# Patient Record
Sex: FEMALE | Race: WHITE | NOT HISPANIC OR LATINO | Employment: OTHER | URBAN - METROPOLITAN AREA
[De-identification: names, ages, dates, MRNs, and addresses within clinical notes are randomized per-mention and may not be internally consistent; named-entity substitution may affect disease eponyms.]

---

## 2017-06-16 ENCOUNTER — HOSPITAL ENCOUNTER (EMERGENCY)
Facility: CLINIC | Age: 82
Discharge: HOME | End: 2017-06-16
Attending: EMERGENCY MEDICINE | Admitting: EMERGENCY MEDICINE
Payer: MEDICARE

## 2017-06-16 VITALS
DIASTOLIC BLOOD PRESSURE: 92 MMHG | HEART RATE: 93 BPM | SYSTOLIC BLOOD PRESSURE: 173 MMHG | BODY MASS INDEX: 23.92 KG/M2 | RESPIRATION RATE: 16 BRPM | WEIGHT: 130 LBS | TEMPERATURE: 99.1 F | OXYGEN SATURATION: 97 % | HEIGHT: 62 IN

## 2017-06-16 DIAGNOSIS — M25.475 BILATERAL SWELLING OF FEET AND ANKLES: ICD-10-CM

## 2017-06-16 DIAGNOSIS — M25.472 BILATERAL SWELLING OF FEET AND ANKLES: ICD-10-CM

## 2017-06-16 DIAGNOSIS — M25.474 BILATERAL SWELLING OF FEET AND ANKLES: ICD-10-CM

## 2017-06-16 DIAGNOSIS — M25.471 BILATERAL SWELLING OF FEET AND ANKLES: ICD-10-CM

## 2017-06-16 LAB
ALBUMIN SERPL-MCNC: 3.2 G/DL (ref 3.4–5)
ALBUMIN UR-MCNC: 10 MG/DL
ALP SERPL-CCNC: 51 U/L (ref 40–150)
ALT SERPL W P-5'-P-CCNC: 23 U/L (ref 0–50)
ANION GAP SERPL CALCULATED.3IONS-SCNC: 7 MMOL/L (ref 3–14)
APPEARANCE UR: CLEAR
AST SERPL W P-5'-P-CCNC: 19 U/L (ref 0–45)
BACTERIA #/AREA URNS HPF: ABNORMAL /HPF
BASOPHILS # BLD AUTO: 0 10E9/L (ref 0–0.2)
BASOPHILS NFR BLD AUTO: 0.2 %
BILIRUB SERPL-MCNC: 0.4 MG/DL (ref 0.2–1.3)
BILIRUB UR QL STRIP: NEGATIVE
BUN SERPL-MCNC: 21 MG/DL (ref 7–30)
CALCIUM SERPL-MCNC: 8.5 MG/DL (ref 8.5–10.1)
CHLORIDE SERPL-SCNC: 108 MMOL/L (ref 94–109)
CO2 SERPL-SCNC: 25 MMOL/L (ref 20–32)
COLOR UR AUTO: YELLOW
CREAT SERPL-MCNC: 0.61 MG/DL (ref 0.52–1.04)
DIFFERENTIAL METHOD BLD: NORMAL
EOSINOPHIL # BLD AUTO: 0 10E9/L (ref 0–0.7)
EOSINOPHIL NFR BLD AUTO: 0.7 %
ERYTHROCYTE [DISTWIDTH] IN BLOOD BY AUTOMATED COUNT: 14.2 % (ref 10–15)
GFR SERPL CREATININE-BSD FRML MDRD: ABNORMAL ML/MIN/1.7M2
GLUCOSE SERPL-MCNC: 100 MG/DL (ref 70–99)
GLUCOSE UR STRIP-MCNC: NEGATIVE MG/DL
HCT VFR BLD AUTO: 39.4 % (ref 35–47)
HGB BLD-MCNC: 13.1 G/DL (ref 11.7–15.7)
HGB UR QL STRIP: NEGATIVE
IMM GRANULOCYTES # BLD: 0 10E9/L (ref 0–0.4)
IMM GRANULOCYTES NFR BLD: 0.2 %
INTERPRETATION ECG - MUSE: NORMAL
KETONES UR STRIP-MCNC: NEGATIVE MG/DL
LEUKOCYTE ESTERASE UR QL STRIP: ABNORMAL
LYMPHOCYTES # BLD AUTO: 1.5 10E9/L (ref 0.8–5.3)
LYMPHOCYTES NFR BLD AUTO: 34.9 %
MCH RBC QN AUTO: 33 PG (ref 26.5–33)
MCHC RBC AUTO-ENTMCNC: 33.2 G/DL (ref 31.5–36.5)
MCV RBC AUTO: 99 FL (ref 78–100)
MONOCYTES # BLD AUTO: 0.2 10E9/L (ref 0–1.3)
MONOCYTES NFR BLD AUTO: 4.4 %
MUCOUS THREADS #/AREA URNS LPF: PRESENT /LPF
NEUTROPHILS # BLD AUTO: 2.6 10E9/L (ref 1.6–8.3)
NEUTROPHILS NFR BLD AUTO: 59.6 %
NITRATE UR QL: NEGATIVE
NRBC # BLD AUTO: 0 10*3/UL
NRBC BLD AUTO-RTO: 0 /100
NT-PROBNP SERPL-MCNC: 473 PG/ML (ref 0–1800)
PH UR STRIP: 6 PH (ref 5–7)
PLATELET # BLD AUTO: 175 10E9/L (ref 150–450)
POTASSIUM SERPL-SCNC: 4.1 MMOL/L (ref 3.4–5.3)
PROT SERPL-MCNC: 5.9 G/DL (ref 6.8–8.8)
RBC # BLD AUTO: 3.97 10E12/L (ref 3.8–5.2)
RBC #/AREA URNS AUTO: 1 /HPF (ref 0–2)
SODIUM SERPL-SCNC: 140 MMOL/L (ref 133–144)
SP GR UR STRIP: 1.01 (ref 1–1.03)
SQUAMOUS #/AREA URNS AUTO: <1 /HPF (ref 0–1)
URN SPEC COLLECT METH UR: ABNORMAL
UROBILINOGEN UR STRIP-MCNC: NORMAL MG/DL (ref 0–2)
WBC # BLD AUTO: 4.3 10E9/L (ref 4–11)
WBC #/AREA URNS AUTO: 8 /HPF (ref 0–2)

## 2017-06-16 PROCEDURE — 87186 SC STD MICRODIL/AGAR DIL: CPT | Performed by: EMERGENCY MEDICINE

## 2017-06-16 PROCEDURE — 93005 ELECTROCARDIOGRAM TRACING: CPT

## 2017-06-16 PROCEDURE — 85025 COMPLETE CBC W/AUTO DIFF WBC: CPT | Performed by: EMERGENCY MEDICINE

## 2017-06-16 PROCEDURE — 99284 EMERGENCY DEPT VISIT MOD MDM: CPT

## 2017-06-16 PROCEDURE — 83880 ASSAY OF NATRIURETIC PEPTIDE: CPT | Performed by: EMERGENCY MEDICINE

## 2017-06-16 PROCEDURE — 81001 URINALYSIS AUTO W/SCOPE: CPT | Performed by: EMERGENCY MEDICINE

## 2017-06-16 PROCEDURE — 87088 URINE BACTERIA CULTURE: CPT | Performed by: EMERGENCY MEDICINE

## 2017-06-16 PROCEDURE — 80053 COMPREHEN METABOLIC PANEL: CPT | Performed by: EMERGENCY MEDICINE

## 2017-06-16 PROCEDURE — 87086 URINE CULTURE/COLONY COUNT: CPT | Performed by: EMERGENCY MEDICINE

## 2017-06-16 ASSESSMENT — ENCOUNTER SYMPTOMS
FEVER: 0
GASTROINTESTINAL NEGATIVE: 1
SHORTNESS OF BREATH: 0
COUGH: 0
CHILLS: 0

## 2017-06-16 NOTE — ED PROVIDER NOTES
History     Chief Complaint:  Feet swelling    HPI   Danni Alvarez is a 84 year old female who presents for evaluation of feet swelling. The patient reports a 3-4 day history of painless bilateral pedal edema. She had been wearing some tight stockings prior to onset, but otherwise has not had any recent leg injuries. The patient denies chest pain, dyspnea, fever or chills, cough, abdominal pain, urinary symptoms, gait difficulty, or any other acute symptoms. The patient has not been up on her feet more than normal recently. 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 No history of PE/DVT or CHF.    Her only risk factors for PE/DVT are age and gender.       Allergies:  Morphine      Medications:    traMADol (ULTRAM) 50 MG tablet  ALPRAZolam (XANAX PO)  Donepezil HCl (ARICEPT PO)  Levothyroxine Sodium (LEVOTHROID PO)  LIPITOR 10 MG OR TABS    Past Medical History:    Shoulder impingement syndrome   Abnormal feces  Hyperlipidemia  Anxiety  Hypothyroidism  Major depressive disorder  Ca colon  Mild cognitive impairment    Past Surgical History:    Colectomy  Hernia repair  Tubal ligation  Ptosis repair, bilateral     Family History:    History reviewed. No pertinent family history.      Social History:  Non-smoker. Occasional alcohol consumption.  Marital Status:   [5]  Employed as a realtor.  An active golfer.     Review of Systems   Constitutional: Negative for chills and fever.   Respiratory: Negative for cough and shortness of breath.    Cardiovascular: Positive for leg swelling (see HPI). Negative for chest pain.   Gastrointestinal: Negative.    Musculoskeletal: Negative for gait problem.   All other systems reviewed and are negative.        Physical Exam   First Vitals:       Patient Vitals for the past 24 hrs:   BP Temp Temp src Heart Rate Resp SpO2 Height Weight   06/16/17 1230 (!) 143/94 - - - - 96 % - -   06/16/17 1141 (!) 146/94 99.1  F (37.3  C) Oral 82 16 95 % 1.575 m (5'  "2\") 59 kg (130 lb)      Physical Exam  General: Patient is alert and normal appearing.  HEENT: Head atraumatic    Eyes: pupils equal and reactive. Conjunctiva clear   Nares: patent   Oropharynx: no lesions, uvula midline, no palatal draping, normal voice, no trismus  Neck: Supple without lymphadenopathy, no meningismus  Chest: Heart regular rate and rhythm.   Lungs: Equal clear to auscultation with no wheeze or rales  Abdomen: Soft, non tender, nondistended, normal bowel sounds  Back: No costovertebral angle tenderness, no midline C, T or L spine tenderness  Neuro: Grossly nonfocal, normal speech, strength equal bilaterally, CN 2-12 intact  Extremities: No deformities, equal radial and DP pulses. No clubbing, cyanosis.  Bilateral fluid edema to ankles and feet, feet warm and well perfused.  No calf swelling, erythema, warmth or tenderness  Skin: Warm and dry with no rash.       Emergency Department Course   ECG:  ECG (12:06:47):  Indication: pedal edema  Rate 72 bpm. UT interval 144. QRS duration 68. QT/QTc 404/442. P-R-T axes 59, -34, 78.   NSR. Left axis deviation. Abnormal EKG.  No previous EKG available for comparison.   Interpreted at 1207 by Oanh Stoddard MD.     Laboratory:  CBC w/diff: WNL (WBC 4.3, Hgb 13.1, Plt 175)  CMP: Glu 100 (H), Alb 3.2 (L), t-Prot 5.9 (L), Cr 0.61 (WNL), o/w WNL   BNP: 473 (WNL)   UA: 10 protein, trace leukocyte esterase, 8 WBC, few bacteria, mucous present, o/w negative     Emergency Department Course:  Past medical records, nursing notes, and vitals reviewed.  1201: I performed an exam of the patient as documented above. GCS 15.   IV access established. Blood drawn and sent.  The above EKG and labs were obtained.  Clinical findings and plan explained to the Patient. Patient discharged home with instructions regarding supportive care, medications, and reasons to return as well as the importance of close follow-up were reviewed.        Impression & Plan    Medical Decision " Makin year old female presents to the ED with bilateral ankle and foot swelling. She says she has noticed it for a few weeks but today people with whom she works (she still works as a realtor showing houses) were concerned about her swelling and thought she should come in. She has had no chest pain, shortness of breath, calf pain or swelling. There is no erythema or warmth. I do not suspect DVT given bilateral symptoms. I do not feel this is consistent with cardiac etiology but did do an EKG that was within acceptable limits without any acute ischemic changes. BNP is normal.  LFTs were all within normal limits. There is mild protein in her urine so there may be a component of some nephrotic syndrome but it is not a significant amount and she has actually had more in the past. She does have trace leukocyte esterase and 8 white blood cells and a few bacteria but she has had no UTI symptoms and I reconfirmed that with her so have sent it for culture. Her electrolytes were otherwise within normal limits. Creatinine is normal. She has had normal urinary output. I do not feel this is consistent with acute volume overload. I have recommended her to use compression stockings and elevate her legs.   If that fails to improve her symptoms she should follow up with her PCP for possible echocardiogram or Lasix. She expressed agreement and understanding with this plan. All questions fully answered.         Diagnosis:    ICD-10-CM    1. Bilateral swelling of feet and ankles M25.473     M25.476        Disposition:  discharged to home    Discharge Medications:  New Prescriptions    No medications on file         Joseph Shay  2017    EMERGENCY DEPARTMENT  IJoseph am serving as a scribe at 11:42 AM on 2017 to document services personally performed by Oanh Stoddard MD based on my observations and the provider's statements to me.      Oanh Stoddard MD  [phone]

## 2017-06-16 NOTE — ED AVS SNAPSHOT
Emergency Department    [address] [place] [address]    Phone:  [phone]    Fax:  [phone]                                       Danni Alvarez   MRN: [id number]    Department:   Emergency Department   Date of Visit:  6/16/2017           After Visit Summary Signature Page     I have received my discharge instructions, and my questions have been answered. I have discussed any challenges I see with this plan with the nurse or doctor.    ..........................................................................................................................................  Patient/Patient Representative Signature      ..........................................................................................................................................  Patient Representative Print Name and Relationship to Patient    ..................................................               ................................................  Date                                            Time    ..........................................................................................................................................  Reviewed by Signature/Title    ...................................................              ..............................................  Date                                                            Time

## 2017-06-16 NOTE — DISCHARGE INSTRUCTIONS
Coping with Edema  What is edema?  Edema is the build-up of fluid in the body, which causes swelling. Swelling most commonly occurs in the feet, ankles, lower legs or hands.  Swelling can occur in the belly or chest may be a sign of a more severe problem.  Certain medicines or conditions can make the swelling worse.  Symptoms include:    Feet and lower legs get larger when you sit or walk.    Hands feel tight when you make a fist.    When you push on the skin, skin stays dented.    Shiny, tight skin.    Fast weight gain.  How is it treated?  Your care team may give you a medicine to reduce the swelling.  They may also suggest that you meet with a dietitian. He or she can help with food choices to reduce the swelling.  What can I do about the swelling?    Place your feet above your heart 3 times a day: Sit with your feet up on a stool with a pillow. Sit on the bed or couch with two pillows under your feet.    Do not stand for long periods of time.    Wear loose-fitting clothes.    Do not cross your legs.    Reduce the salt in your diet.   These foods are high in salt:    Chips, soup    Frozen meals, TV dinners    Whitman, lunch meat, ham    Sauces (soy, canned spaghetti sauce)    Walk or do other exercise.    Wear compression stockings.    Drink water as normal.    Weigh yourself every day at the same time to keep track of weight gain.  When should I call my care team?  Call your care team if:    You have a hard time breathing.    You gained 5 pounds or more in 1 week.    Your hands or feet feel cold when you touch them.    You are peeing very little or not at all.    Swelling is moving up your arms or legs.    Your tongue is swelling.    You cannot eat for more than a day  If you have any side effects, call us. We can help you manage these problems.  For more information,  see:  www.[facility].com  www.cancer.org/treatment/treatmentsandsideeffects/physicalsideeffects/dealingwithsymptomsathome  www.cancer.gov/cancertopics/coping/chemotherapy-and-you  Comments:  __________________________________________  __________________________________________  __________________________________________  __________________________________________  __________________________________________  __________________________________________  __________________________________________  For informational purposes only. Not to replace the advice of your health care provider.  Copyright   2014 [employer]. All rights reserved. [employer] 729498 - REV 03/16.        Wear compression stockings.  Elevate legs as much as possible.

## 2017-06-16 NOTE — ED AVS SNAPSHOT
Emergency Department    [address] [facility] [address]    Phone:  [phone]    Fax:  [phone]                                       Danni Alvarez   MRN: [id number]    Department:   Emergency Department   Date of Visit:  6/16/2017           Patient Information     Date Of Birth          1/16/1933        Your diagnoses for this visit were:     Bilateral swelling of feet and ankles        You were seen by Oanh Stoddard MD.      Follow-up Information     Follow up with Catrachito Figueroa MD In 3 days.    Specialty:  Ophthalmology    Contact information:    [facility]  [address]  [facility] [address] [phone]          Follow up with  Emergency Department.    Specialty:  EMERGENCY MEDICINE    Why:  If symptoms worsen    Contact information:    [address] [facility] [id number] [phone]        Discharge Instructions       Coping with Edema  What is edema?  Edema is the build-up of fluid in the body, which causes swelling. Swelling most commonly occurs in the feet, ankles, lower legs or hands.  Swelling can occur in the belly or chest may be a sign of a more severe problem.  Certain medicines or conditions can make the swelling worse.  Symptoms include:    Feet and lower legs get larger when you sit or walk.    Hands feel tight when you make a fist.    When you push on the skin, skin stays dented.    Shiny, tight skin.    Fast weight gain.  How is it treated?  Your care team may give you a medicine to reduce the swelling.  They may also suggest that you meet with a dietitian. He or she can help with food choices to reduce the swelling.  What can I do about the swelling?    Place your feet above your heart 3 times a day: Sit with your feet up on a stool with a pillow. Sit on the bed or couch with two pillows under your feet.    Do not stand for long periods of time.    Wear loose-fitting clothes.    Do not cross your legs.    Reduce the  salt in your diet.   These foods are high in salt:    Chips, soup    Frozen meals, TV dinners    Whitman, lunch meat, ham    Sauces (soy, canned spaghetti sauce)    Walk or do other exercise.    Wear compression stockings.    Drink water as normal.    Weigh yourself every day at the same time to keep track of weight gain.  When should I call my care team?  Call your care team if:    You have a hard time breathing.    You gained 5 pounds or more in 1 week.    Your hands or feet feel cold when you touch them.    You are peeing very little or not at all.    Swelling is moving up your arms or legs.    Your tongue is swelling.    You cannot eat for more than a day  If you have any side effects, call us. We can help you manage these problems.  For more information, see:  www.chemocare.com  www.cancer.org/treatment/treatmentsandsideeffects/physicalsideeffects/dealingwithsymptomsathome  www.cancer.gov/cancertopics/coping/chemotherapy-and-you  Comments:  __________________________________________  __________________________________________  __________________________________________  __________________________________________  __________________________________________  __________________________________________  __________________________________________  For informational purposes only. Not to replace the advice of your health care provider.  Copyright   2014 [facility]. All rights reserved. [employer] 848952 - REV 03/16.        Wear compression stockings.  Elevate legs as much as possible.      24 Hour Appointment Hotline       To make an appointment at any [facility], call [phone] ([phone]). If you don't have a family doctor or clinic, we will help you find one. [facility] clinics are conveniently located to serve the needs of you and your family.             Review of your medicines      Our records show that you are taking the medicines listed below. If these are incorrect, please call your  family doctor or clinic.        Dose / Directions Last dose taken    ARICEPT PO   Dose:  10 mg        Take 10 mg by mouth At Bedtime   Refills:  0        erythromycin ophthalmic ointment   Commonly known as:  ROMYCIN   Quantity:  3.5 g        Apply small amount to inner lower lid of operative eye(s) at bedtime for 7 days and as needed   Refills:  10        LEVOTHROID PO   Dose:  50 mcg        Take 50 mcg by mouth   Refills:  0        LIPITOR 10 MG tablet   Generic drug:  atorvastatin        1 TABLET DAILY   Refills:  0        neomycin-polymyxin-dexamethasone [id number] Susp ophthalmic susp   Commonly known as:  MAXITROL   Quantity:  1 Bottle        Place 1 drop in operative eye(s) 3 times daily for 10 days   Refills:  11        traMADol 50 MG tablet   Commonly known as:  ULTRAM   Dose:   mg   Quantity:  30 tablet        Take 1-2 tablets ( mg) by mouth every 6 hours as needed for moderate pain   Refills:  0        XANAX PO   Dose:  0.25 mg        Take 0.25 mg by mouth 2 times daily   Refills:  0                Procedures and tests performed during your visit     CBC with platelets differential    Comprehensive metabolic panel    EKG 12-lead, tracing only    Nt probnp inpatient (BNP)    UA reflex to Microscopic and Culture      Orders Needing Specimen Collection     None      Pending Results     No orders found from 6/14/2017 to 6/17/2017.            Pending Culture Results     No orders found from 6/14/2017 to 6/17/2017.            Pending Results Instructions     If you had any lab results that were not finalized at the time of your Discharge, you can call the ED Lab Result RN at [phone]. You will be contacted by this team for any positive Lab results or changes in treatment. The nurses are available 7 days a week from 10A to 6:30P.  You can leave a message 24 hours per day and they will return your call.        Test Results From Your Hospital Stay        6/16/2017 12:24 PM      Component Results      Component Value Ref Range & Units Status    WBC 4.3 4.0 - 11.0 10e9/L Final    RBC Count 3.97 3.8 - 5.2 10e12/L Final    Hemoglobin 13.1 11.7 - 15.7 g/dL Final    Hematocrit 39.4 35.0 - 47.0 % Final    MCV 99 78 - 100 fl Final    MCH 33.0 26.5 - 33.0 pg Final    MCHC 33.2 31.5 - 36.5 g/dL Final    RDW 14.2 10.0 - 15.0 % Final    Platelet Count 175 150 - 450 10e9/L Final    Diff Method Automated Method  Final    % Neutrophils 59.6 % Final    % Lymphocytes 34.9 % Final    % Monocytes 4.4 % Final    % Eosinophils 0.7 % Final    % Basophils 0.2 % Final    % Immature Granulocytes 0.2 % Final    Nucleated RBCs 0 0 /100 Final    Absolute Neutrophil 2.6 1.6 - 8.3 10e9/L Final    Absolute Lymphocytes 1.5 0.8 - 5.3 10e9/L Final    Absolute Monocytes 0.2 0.0 - 1.3 10e9/L Final    Absolute Eosinophils 0.0 0.0 - 0.7 10e9/L Final    Absolute Basophils 0.0 0.0 - 0.2 10e9/L Final    Abs Immature Granulocytes 0.0 0 - 0.4 10e9/L Final    Absolute Nucleated RBC 0.0  Final         6/16/2017 12:41 PM      Component Results     Component Value Ref Range & Units Status    Sodium 140 133 - 144 mmol/L Final    Potassium 4.1 3.4 - 5.3 mmol/L Final    Chloride 108 94 - 109 mmol/L Final    Carbon Dioxide 25 20 - 32 mmol/L Final    Anion Gap 7 3 - 14 mmol/L Final    Glucose 100 (H) 70 - 99 mg/dL Final    Urea Nitrogen 21 7 - 30 mg/dL Final    Creatinine 0.61 0.52 - 1.04 mg/dL Final    GFR Estimate >90  Non  GFR Calc   >60 mL/min/1.7m2 Final    GFR Estimate If Black >90   GFR Calc   >60 mL/min/1.7m2 Final    Calcium 8.5 8.5 - 10.1 mg/dL Final    Bilirubin Total 0.4 0.2 - 1.3 mg/dL Final    Albumin 3.2 (L) 3.4 - 5.0 g/dL Final    Protein Total 5.9 (L) 6.8 - 8.8 g/dL Final    Alkaline Phosphatase 51 40 - 150 U/L Final    ALT 23 0 - 50 U/L Final    AST 19 0 - 45 U/L Final         6/16/2017 12:41 PM      Component Results     Component Value Ref Range & Units Status    N-Terminal Pro BNP Inpatient 473 0 - 1800  pg/mL Final    Reference range shown and results flagged as abnormal are suggested inpatient   cut points for confirming diagnosis if CHF in an acute setting. Establishing   a   baseline value for each individual patient is useful for follow-up. An   inpatient or emergency department NT-proPBNP <300 pg/mL effectively rules out   acute CHF, with 99% negative predictive value.  The outpatient non-acute reference range for ruling out CHF is:   0-125 pg/mL (age 18 to less than 75)   0-450 pg/mL (age 75 yrs and older)           6/16/2017 12:36 PM      Component Results     Component Value Ref Range & Units Status    Color Urine Yellow  Final    Appearance Urine Clear  Final    Glucose Urine Negative NEG mg/dL Final    Bilirubin Urine Negative NEG Final    Ketones Urine Negative NEG mg/dL Final    Specific Gravity Urine 1.012 1.003 - 1.035 Final    Blood Urine Negative NEG Final    pH Urine 6.0 5.0 - 7.0 pH Final    Protein Albumin Urine 10 (A) NEG mg/dL Final    Urobilinogen mg/dL Normal 0.0 - 2.0 mg/dL Final    Nitrite Urine Negative NEG Final    Leukocyte Esterase Urine Trace (A) NEG Final    Source Midstream Urine  Final    RBC Urine 1 0 - 2 /HPF Final    WBC Urine 8 (H) 0 - 2 /HPF Final    Bacteria Urine Few (A) NEG /HPF Final    Squamous Epithelial /HPF Urine <1 0 - 1 /HPF Final    Mucous Urine Present (A) NEG /LPF Final                Clinical Quality Measure: Blood Pressure Screening     Your blood pressure was checked while you were in the emergency department today. The last reading we obtained was  BP: (!) 146/94 . Please read the guidelines below about what these numbers mean and what you should do about them.  If your systolic blood pressure (the top number) is less than 120 and your diastolic blood pressure (the bottom number) is less than 80, then your blood pressure is normal. There is nothing more that you need to do about it.  If your systolic blood pressure (the top number) is 120-139 or your diastolic  "blood pressure (the bottom number) is 80-89, your blood pressure may be higher than it should be. You should have your blood pressure rechecked within a year by a primary care provider.  If your systolic blood pressure (the top number) is 140 or greater or your diastolic blood pressure (the bottom number) is 90 or greater, you may have high blood pressure. High blood pressure is treatable, but if left untreated over time it can put you at risk for heart attack, stroke, or kidney failure. You should have your blood pressure rechecked by a primary care provider within the next 4 weeks.  If your provider in the emergency department today gave you specific instructions to follow-up with your doctor or provider even sooner than that, you should follow that instruction and not wait for up to 4 weeks for your follow-up visit.        Thank you for choosing [facility]       Thank you for choosing [facility] for your care. Our goal is always to provide you with excellent care. Hearing back from our patients is one way we can continue to improve our services. Please take a few minutes to complete the written survey that you may receive in the mail after you visit with us. Thank you!        [employer] Information     [employer] lets you send messages to your doctor, view your test results, renew your prescriptions, schedule appointments and more. To sign up, go to [email or web address] . Click on \"Log in\" on the left side of the screen, which will take you to the Welcome page. Then click on \"Sign up Now\" on the right side of the page.     You will be asked to enter the access code listed below, as well as some personal information. Please follow the directions to create your username and password.     Your access code is: [email or web address]  Expires: 2017  1:08 PM     Your access code will  in 90 days. If you need help or a new code, please call your [facility] clinic or [phone].        Care EveryWhere ID     This is your Care " EveryWhere ID. This could be used by other organizations to access your [facility] medical records  [id number]        After Visit Summary       This is your record. Keep this with you and show to your community pharmacist(s) and doctor(s) at your next visit.

## 2017-06-17 ENCOUNTER — TELEPHONE (OUTPATIENT)
Dept: EMERGENCY MEDICINE | Facility: CLINIC | Age: 82
End: 2017-06-17

## 2017-06-17 NOTE — TELEPHONE ENCOUNTER
[facility] Emergency Department Lab result notification [Adult-Female]    [facility] ED lab result protocol used  Urine Culture Protcol    Reason for call  Notify of lab results, assess symptoms,  review ED providers recommendations/discharge instructions (if necessary) and advise per ED lab result f/u protocol    Lab Result (including Rx patient on, if applicable)  Preliminary urine culture report on 06/17/2017 shows the presence of bacteria(s):  50,000 to 100,000 colonies/mL Lactose fermenting gram negative rods  [facility] ED discharge antibiotic: none  Recommendations per [facility] ED Lab result protocol - Urine culture protocol.  Information table from ED Provider visit on 06/16/2017  ED diagnosis Bilateral swelling of feet and ankles   ED provider Oanh Stoddard MD   Symptoms reported at ED visit (Chief complaint, HPI)  a 84 year old female who presents for evaluation of feet swelling. The patient reports a 3-4 day history of painless bilateral pedal edema. She had been wearing some tight stockings prior to onset, but otherwise has not had any recent leg injuries. The patient denies chest pain, dyspnea, fever or chills, cough, abdominal pain, urinary symptoms, gait difficulty, or any other acute symptoms. The patient has not been up on her feet more than normal recently. 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 No history of PE/DVT or CHF.   ED providers Impression and Plan (applicable information) 84 year old female presents to the ED with bilateral ankle and foot swelling. She says she has noticed it for a few weeks but today people with whom she works (she still works as a realtor showing houses) were concerned about her swelling and thought she should come in. She has had no chest pain, shortness of breath, calf pain or swelling. There is no erythema or warmth. I do not suspect DVT given bilateral symptoms. I do not feel this is consistent with cardiac etiology but did do an  EKG that was within acceptable limits without any acute ischemic changes. BNP is normal.  LFTs were all within normal limits. There is mild protein in her urine so there may be a component of some nephrotic syndrome but it is not a significant amount and she has actually had more in the past. She does have trace leukocyte esterase and 8 white blood cells and a few bacteria but she has had no UTI symptoms and I reconfirmed that with her so have sent it for culture. Her electrolytes were otherwise within normal limits. Creatinine is normal. She has had normal urinary output. I do not feel this is consistent with acute volume overload. I have recommended her to use compression stockings and elevate her legs.   If that fails to improve her symptoms she should follow up with her PCP for possible echocardiogram or Lasix. She expressed agreement and understanding with this plan. All questions fully answered.    Significant Medical hx, if applicable Reviewed   Coumadin/Warfarin [Yes /No] no   Creatinine Level (mg/dl) 0.61   Creatinine clearance (ml/min), if applicable 63   Allergies Morphine   Weight, if applicable 59      RN Assessment (Patient s current Symptoms), include time called.  [Insert Left message here if message left]  At 1523 Left voicemail message requesting a call back to [phone] between 10 a.m. and 6:30 p.m., 7 days a week for patient's ED/UC lab results.  May leave a message 24/7, if no one available.         Emily Khalil RN  [facility] RN  Lung Nodule and ED Lab Result F/u RN  Epic pool (ED late result f/u RN): [id number]  [facility] INCIDENTAL RADIOLOGY F/U NURSES: [id number]  Ph# [phone]    Copy of Lab result   Preliminary Result   Exam Information   Exam Date Exam Time Accession # Results    6/16/17 12:30 PM [id number]    Component Results   Component Collected Lab   Specimen Description 06/16/2017 12:30 PM [id number]   Midstream Urine   Special Requests 06/16/2017 12:30 PM 75    Specimen received in preservative   Culture Micro (Abnormal) 06/16/2017 12:30    50,000 to 100,000 colonies/mL Lactose fermenting gram negative rods   Susceptibility testing in progress      Micro Report Status 06/16/2017 12:30    Pending

## 2017-06-18 LAB
BACTERIA SPEC CULT: ABNORMAL
Lab: ABNORMAL
MICRO REPORT STATUS: ABNORMAL
MICROORGANISM SPEC CULT: ABNORMAL
SPECIMEN SOURCE: ABNORMAL

## 2017-06-18 NOTE — TELEPHONE ENCOUNTER
[facility] Emergency Department Lab result notification     Final urine culture on 06/18/2017 shows the presence of bacteria(s): 50,000 to 100,000 colonies/mL Escherichia coli  [facility] discharge antibiotic: None  As per  ED lab result protocol, treat per Urine culture protocol.    RN Assessment (Patient s current Symptoms), include time called.  [Insert Left message here if message left]  At 1219 Left voicemail message requesting a call back to [phone] between 10 a.m. and 6:30 p.m., 7 days a week for patient's ED/UC lab results.  May leave a message 24/7, if no one available.       Emily Khalil RN  [facility] RN  Lung Nodule and ED Lab Result F/u RN  Epic pool (ED late result f/u RN): [id number]  [facility] INCIDENTAL RADIOLOGY F/U NURSES: P [id number]  Ph# [phone]    Exam Information   Exam Date Exam Time Accession # Results    6/16/17 12:30 PM [id number]    Component Results   Component Collected Lab   Specimen Description 06/16/2017 12:30 PM [id number]   Midstream Urine   Special Requests 06/16/2017 12:30 PM 75   Specimen received in preservative   Culture Micro (Abnormal) 06/16/2017 12:30 PM 75   50,000 to 100,000 colonies/mL Escherichia coli   Micro Report Status 06/16/2017 12:30 PM 75   FINAL 06/18/2017   Organism: 06/16/2017 12:30 PM 75   50,000 to 100,000 colonies/mL Escherichia coli   Culture & Susceptibility   50,000 ,000 COLONIES/ML ESCHERICHIA COLI (JB)   Antibiotic Sensitivity Unit Status   AMPICILLIN <=2 Susceptible ug/mL Final   AMPICILLIN/SULBACTAM <=2 Susceptible ug/mL Final   CEFAZOLIN <=4 Susceptible  Cefazolin JB breakpoints are for the treatment of uncomplicated urinary tract   infections.  For the treatment of systemic infections, please contact the   laboratory for additional testing. ug/mL Final   CEFEPIME <=1 Susceptible ug/mL Final   CEFOXITIN <=4 Susceptible ug/mL Final   CEFTAZIDIME <=1 Susceptible ug/mL Final   CEFTRIAXONE <=1 Susceptible ug/mL  Final   CIPROFLOXACIN <=0.25 Susceptible ug/mL Final   GENTAMICIN >=16 Resistant ug/mL Final   LEVOFLOXACIN <=0.12 Susceptible ug/mL Final   NITROFURANTOIN <=16 Susceptible ug/mL Final   Piperacillin/Tazo <=4 Susceptible ug/mL Final   TOBRAMYCIN >=16 Resistant ug/mL Final   Trimethoprim/Sulfa <=1/19 Susceptible ug/mL Final

## 2017-06-19 RX ORDER — SULFAMETHOXAZOLE/TRIMETHOPRIM 800-160 MG
1 TABLET ORAL 2 TIMES DAILY
Qty: 6 TABLET | Refills: 0 | Status: SHIPPED | OUTPATIENT
Start: 2017-06-19 | End: 2017-06-22

## 2017-09-19 ENCOUNTER — HOSPITAL ENCOUNTER (EMERGENCY)
Facility: CLINIC | Age: 82
Discharge: HOME | End: 2017-09-19
Attending: EMERGENCY MEDICINE | Admitting: EMERGENCY MEDICINE
Payer: MEDICARE

## 2017-09-19 VITALS
DIASTOLIC BLOOD PRESSURE: 75 MMHG | SYSTOLIC BLOOD PRESSURE: 152 MMHG | OXYGEN SATURATION: 97 % | HEIGHT: 62 IN | RESPIRATION RATE: 12 BRPM | TEMPERATURE: 97.6 F

## 2017-09-19 DIAGNOSIS — M62.81 GENERALIZED MUSCLE WEAKNESS: ICD-10-CM

## 2017-09-19 LAB
ALBUMIN SERPL-MCNC: 3.1 G/DL (ref 3.4–5)
ALBUMIN UR-MCNC: 10 MG/DL
ALP SERPL-CCNC: 52 U/L (ref 40–150)
ALT SERPL W P-5'-P-CCNC: 24 U/L (ref 0–50)
ANION GAP SERPL CALCULATED.3IONS-SCNC: 9 MMOL/L (ref 3–14)
APPEARANCE UR: CLEAR
AST SERPL W P-5'-P-CCNC: 22 U/L (ref 0–45)
BASOPHILS # BLD AUTO: 0 10E9/L (ref 0–0.2)
BASOPHILS NFR BLD AUTO: 0 %
BILIRUB SERPL-MCNC: 0.5 MG/DL (ref 0.2–1.3)
BILIRUB UR QL STRIP: NEGATIVE
BUN SERPL-MCNC: 15 MG/DL (ref 7–30)
CALCIUM SERPL-MCNC: 7.9 MG/DL (ref 8.5–10.1)
CHLORIDE SERPL-SCNC: 103 MMOL/L (ref 94–109)
CO2 SERPL-SCNC: 27 MMOL/L (ref 20–32)
COLOR UR AUTO: YELLOW
CREAT SERPL-MCNC: 0.54 MG/DL (ref 0.52–1.04)
DIFFERENTIAL METHOD BLD: ABNORMAL
EOSINOPHIL # BLD AUTO: 0 10E9/L (ref 0–0.7)
EOSINOPHIL NFR BLD AUTO: 0.3 %
ERYTHROCYTE [DISTWIDTH] IN BLOOD BY AUTOMATED COUNT: 14.1 % (ref 10–15)
GFR SERPL CREATININE-BSD FRML MDRD: >90 ML/MIN/1.7M2
GLUCOSE SERPL-MCNC: 152 MG/DL (ref 70–99)
GLUCOSE UR STRIP-MCNC: NEGATIVE MG/DL
HCT VFR BLD AUTO: 39.7 % (ref 35–47)
HGB BLD-MCNC: 13.5 G/DL (ref 11.7–15.7)
HGB UR QL STRIP: NEGATIVE
IMM GRANULOCYTES # BLD: 0 10E9/L (ref 0–0.4)
IMM GRANULOCYTES NFR BLD: 0 %
KETONES UR STRIP-MCNC: NEGATIVE MG/DL
LEUKOCYTE ESTERASE UR QL STRIP: ABNORMAL
LYMPHOCYTES # BLD AUTO: 1 10E9/L (ref 0.8–5.3)
LYMPHOCYTES NFR BLD AUTO: 28.8 %
MCH RBC QN AUTO: 33.4 PG (ref 26.5–33)
MCHC RBC AUTO-ENTMCNC: 34 G/DL (ref 31.5–36.5)
MCV RBC AUTO: 98 FL (ref 78–100)
MONOCYTES # BLD AUTO: 0.2 10E9/L (ref 0–1.3)
MONOCYTES NFR BLD AUTO: 4.5 %
MUCOUS THREADS #/AREA URNS LPF: PRESENT /LPF
NEUTROPHILS # BLD AUTO: 2.2 10E9/L (ref 1.6–8.3)
NEUTROPHILS NFR BLD AUTO: 66.4 %
NITRATE UR QL: NEGATIVE
NRBC # BLD AUTO: 0 10*3/UL
NRBC BLD AUTO-RTO: 0 /100
PH UR STRIP: 5 PH (ref 5–7)
PLATELET # BLD AUTO: 184 10E9/L (ref 150–450)
POTASSIUM SERPL-SCNC: 4.2 MMOL/L (ref 3.4–5.3)
PROT SERPL-MCNC: 6 G/DL (ref 6.8–8.8)
RBC # BLD AUTO: 4.04 10E12/L (ref 3.8–5.2)
RBC #/AREA URNS AUTO: 0 /HPF (ref 0–2)
SODIUM SERPL-SCNC: 139 MMOL/L (ref 133–144)
SOURCE: ABNORMAL
SP GR UR STRIP: 1.01 (ref 1–1.03)
SQUAMOUS #/AREA URNS AUTO: <1 /HPF (ref 0–1)
TSH SERPL DL<=0.005 MIU/L-ACNC: 0.89 MU/L (ref 0.4–4)
UROBILINOGEN UR STRIP-MCNC: NORMAL MG/DL (ref 0–2)
WBC # BLD AUTO: 3.4 10E9/L (ref 4–11)
WBC #/AREA URNS AUTO: 1 /HPF (ref 0–2)

## 2017-09-19 PROCEDURE — 25000128 H RX IP 250 OP 636: Performed by: PHYSICIAN ASSISTANT

## 2017-09-19 PROCEDURE — 81001 URINALYSIS AUTO W/SCOPE: CPT | Performed by: PHYSICIAN ASSISTANT

## 2017-09-19 PROCEDURE — 80053 COMPREHEN METABOLIC PANEL: CPT | Performed by: PHYSICIAN ASSISTANT

## 2017-09-19 PROCEDURE — 84443 ASSAY THYROID STIM HORMONE: CPT | Performed by: PHYSICIAN ASSISTANT

## 2017-09-19 PROCEDURE — 96360 HYDRATION IV INFUSION INIT: CPT

## 2017-09-19 PROCEDURE — 85025 COMPLETE CBC W/AUTO DIFF WBC: CPT | Performed by: PHYSICIAN ASSISTANT

## 2017-09-19 PROCEDURE — 99284 EMERGENCY DEPT VISIT MOD MDM: CPT | Mod: 25

## 2017-09-19 PROCEDURE — 93005 ELECTROCARDIOGRAM TRACING: CPT

## 2017-09-19 RX ORDER — ONDANSETRON 2 MG/ML
4 INJECTION INTRAMUSCULAR; INTRAVENOUS EVERY 30 MIN PRN
Status: DISCONTINUED | OUTPATIENT
Start: 2017-09-19 | End: 2017-09-19 | Stop reason: HOSPADM

## 2017-09-19 RX ORDER — ONDANSETRON 4 MG/1
4 TABLET, ORALLY DISINTEGRATING ORAL EVERY 8 HOURS PRN
Qty: 10 TABLET | Refills: 0 | Status: SHIPPED | OUTPATIENT
Start: 2017-09-19 | End: 2017-09-22

## 2017-09-19 RX ADMIN — SODIUM CHLORIDE 1000 ML: 9 INJECTION, SOLUTION INTRAVENOUS at 07:39

## 2017-09-19 ASSESSMENT — ENCOUNTER SYMPTOMS
SHORTNESS OF BREATH: 0
DIZZINESS: 0
COUGH: 0
DIARRHEA: 1
ABDOMINAL PAIN: 0
DYSURIA: 0
FATIGUE: 1
BLOOD IN STOOL: 0
CHILLS: 0
HEADACHES: 0
FLANK PAIN: 0
FEVER: 0
NAUSEA: 1
VOMITING: 0
WEAKNESS: 1

## 2017-09-19 NOTE — ED PROVIDER NOTES
"  History     Chief Complaint:  Generalized Weakness    HPI   Danni Alvarez is a 84 year old female who presents with generalized weakness.  She states that this morning approximately one hour prior to arrival she woke up laying on the floor.  She cannot recall how she got there.  She does not recall falling or injuring herself.  She denies any head injury.  She denies any pain anywhere.  She went to bed feeling fine last night.  As of late, however, she has been waking up feeling a little \"off\" but cannot describe this further.  Today, the only other symptom she is having is nausea, without vomiting.  She has also been dealing with chronic diarrhea for many months and has been going to her primary doctor, and has tried Metamucil and Imodium, but nothing has been working. She denies blood in the stool. She has been eating or drinking ok, but admits this could be better. She denies any one-sided leg or arm weakness.  She denies any recent illness, sick contacts, or travel. Her daughter does note that she has been using a lot of her prescribed xanax. She denies any fever, dizziness, headache, chest pain, shortness of breath, cough, abdominal pain, dysuria, flank pain, or any other acute symptoms.    Allergies:  Morphine     Medications:    Romycin  Ultram  Xanax  Aricept  Levothyroxine  Lipitor     Past Medical History:    Shoulder impingement syndrome  Major depressive disorder  Hyperlipidemia  Anxiety  Mild cognitive impairment  Hypothyroidism     Past Surgical History:    Bilateral cataract surgery  Colectomy  Hernia repair  Repair ptosis bilateral  Tubal ligation     Family History:    History reviewed. No pertinent family history.      Social History:  Marital Status:    Smoking status: Never smoker  Alcohol use: Yes, occasionally     Review of Systems   Constitutional: Positive for fatigue. Negative for chills and fever.   Respiratory: Negative for cough and shortness of breath.    Cardiovascular: " "Negative for chest pain.   Gastrointestinal: Positive for diarrhea and nausea. Negative for abdominal pain, blood in stool and vomiting.   Genitourinary: Negative for dysuria and flank pain.   Neurological: Positive for weakness. Negative for dizziness and headaches.   All other systems reviewed and are negative.    Physical Exam   First Vitals:  BP: 145/59  Heart Rate: 60  Temp: 97.6  F (36.4  C)  Resp: 12  Height: 157.5 cm (5' 2\")  SpO2: 98 %      Physical Exam  General: Resting comfortably.  Alert and oriented. Seems slightly confused and having trouble finding words(baseline per daughter).   Head:  The scalp, face, and head appear normal. No obvious signs of head injury.   Eyes:  The pupils are equal, round, and reactive to light     Extraocular muscles are intact    Conjunctivae and sclerae are normal    ENT:    The oropharynx is normal    Uvula is in the midline     Moist mucous membranes   Neck:  Normal range of motion    There is no rigidity noted    No lymphadenopathy    There is no midline cervical spine pain/tenderness   CV:  Regular rate and rhythm     Normal S1/S2    No pathological murmur detected   Resp:  Lungs are clear to auscultation    Non-labored    No rales or wheezing   GI:  Abdomen is soft, non-distended    No rebound tenderness     Normal bowel sounds   MS:  Normal muscular tone   Skin:  No rash or acute skin lesions noted   Neuro: Speech is normal and fluent.  Cranial nerves II through XII grossly intact.   and has equal and normal.  The patient is able to move all four extremities with good strength.  Finger-nose-finger intact. No focal deficits.    Emergency Department Course     ECG (6:27:19):  Rate 66 bpm. OH interval 154. QRS duration 72. QT/QTc 448/469. P-R-T axes 53 -6 83. Normal sinus rhythm with sinus arrhythmia. Possible left atrial enlargement. Borderline ECG. Interpreted by Bill Chester MD.     Laboratory:  Results for orders placed or performed during the hospital " encounter of 09/19/17 (from the past 24 hour(s))   EKG 12 lead   Result Value Ref Range    Interpretation ECG Click View Image link to view waveform and result    CBC with platelets differential   Result Value Ref Range    WBC 3.4 (L) 4.0 - 11.0 10e9/L    RBC Count 4.04 3.8 - 5.2 10e12/L    Hemoglobin 13.5 11.7 - 15.7 g/dL    Hematocrit 39.7 35.0 - 47.0 %    MCV 98 78 - 100 fl    MCH 33.4 (H) 26.5 - 33.0 pg    MCHC 34.0 31.5 - 36.5 g/dL    RDW 14.1 10.0 - 15.0 %    Platelet Count 184 150 - 450 10e9/L    Diff Method Automated Method     % Neutrophils 66.4 %    % Lymphocytes 28.8 %    % Monocytes 4.5 %    % Eosinophils 0.3 %    % Basophils 0.0 %    % Immature Granulocytes 0.0 %    Nucleated RBCs 0 0 /100    Absolute Neutrophil 2.2 1.6 - 8.3 10e9/L    Absolute Lymphocytes 1.0 0.8 - 5.3 10e9/L    Absolute Monocytes 0.2 0.0 - 1.3 10e9/L    Absolute Eosinophils 0.0 0.0 - 0.7 10e9/L    Absolute Basophils 0.0 0.0 - 0.2 10e9/L    Abs Immature Granulocytes 0.0 0 - 0.4 10e9/L    Absolute Nucleated RBC 0.0    Comprehensive metabolic panel   Result Value Ref Range    Sodium 139 133 - 144 mmol/L    Potassium 4.2 3.4 - 5.3 mmol/L    Chloride 103 94 - 109 mmol/L    Carbon Dioxide 27 20 - 32 mmol/L    Anion Gap 9 3 - 14 mmol/L    Glucose 152 (H) 70 - 99 mg/dL    Urea Nitrogen 15 7 - 30 mg/dL    Creatinine 0.54 0.52 - 1.04 mg/dL    GFR Estimate >90 >60 mL/min/1.7m2    GFR Estimate If Black >90 >60 mL/min/1.7m2    Calcium 7.9 (L) 8.5 - 10.1 mg/dL    Bilirubin Total 0.5 0.2 - 1.3 mg/dL    Albumin 3.1 (L) 3.4 - 5.0 g/dL    Protein Total 6.0 (L) 6.8 - 8.8 g/dL    Alkaline Phosphatase 52 40 - 150 U/L    ALT 24 0 - 50 U/L    AST 22 0 - 45 U/L   TSH with free T4 reflex   Result Value Ref Range    TSH 0.89 0.40 - 4.00 mU/L   UA with Microscopic reflex to Culture   Result Value Ref Range    Color Urine Yellow     Appearance Urine Clear     Glucose Urine Negative NEG^Negative mg/dL    Bilirubin Urine Negative NEG^Negative    Ketones Urine  Negative NEG^Negative mg/dL    Specific Gravity Urine 1.010 1.003 - 1.035    Blood Urine Negative NEG^Negative    pH Urine 5.0 5.0 - 7.0 pH    Protein Albumin Urine 10 (A) NEG^Negative mg/dL    Urobilinogen mg/dL Normal 0.0 - 2.0 mg/dL    Nitrite Urine Negative NEG^Negative    Leukocyte Esterase Urine Small (A) NEG^Negative    Source Midstream Urine     WBC Urine 1 0 - 2 /HPF    RBC Urine 0 0 - 2 /HPF    Squamous Epithelial /HPF Urine <1 0 - 1 /HPF    Mucous Urine Present (A) NEG^Negative /LPF     Interventions:  Medications   ondansetron (ZOFRAN) injection 4 mg (not administered)   0.9% sodium chloride BOLUS (1,000 mLs Intravenous New Bag 9/19/17 0739)     Emergency Department Course:    ED Course:  I reviewed the patient's medical record.   The patient was seen and examined by myself and Dr. Chester. I discussed the course of care with the patient including laboratory and diagnostic studies.    She understands and is agreeable to the plan.  Recheck. Patient feels improved  Patient was ambulated in the ED and did well and was not symptomatic   I discussed with the patient the results of the above studies and procedures.   She will be discharged to home with a prescription for zofran.    All questions were answered prior to discharge, and the patient was told to follow up per discharge instructions.    Reasons for return as well as follow up were reviewed with the patient. She understands and agrees to this plan.    Impression & Plan    Medical Decision Making:  Danni Alvarez is a 84 year old female who presents with generalized weakness.  The patient is vitally stable and afebrile.  EKG demonstrates possible left atrial enlargement, but otherwise normal and nonischemic. Unchanged from previous. CBC unremarkable.  CMP unremarkable. TSH WNL. UA with no evidence of infection. The patient does not complain of pain and no injuries were found, so I do not think further workup is needed at this time.  After IV fluids, the  patient felt much improved and no longer complained of nausea. The patient was ambulated and did well without symptoms. She would like to go home. I feel like this is appropriate with close PCP follow up given her negative workup and improvement here. She does have a significant history of anxiety and is on Xanax.  Anxiety could be playing a role along with medication reaction.  Dehydration is also considered, given she felt much improved after IV normal saline. She is asked to follow up with her primary care doctor in 2 days for recheck.  She was instructed to return to the ED if she develops fever, increased weakness, one-sided leg or arm weakness, syncope, or any other concerning symptoms.  Patient remained vitally stable throughout her stay in the ED and is discharged in good condition. All questions were answered prior to discharge. The patient understands and agrees to this plan.    Diagnosis:    ICD-10-CM    1. Generalized muscle weakness M62.81        Disposition:  discharged to home    Discharge Medications:  New Prescriptions    ONDANSETRON (ZOFRAN ODT) 4 MG ODT TAB    Take 1 tablet (4 mg) by mouth every 8 hours as needed for nausea       Rekha Lara  9/19/2017    EMERGENCY DEPARTMENT       Rekha Laar PA-C  09/19/17 0882

## 2017-09-19 NOTE — ED AVS SNAPSHOT
Emergency Department    [address] [place] [address]    Phone:  [phone]    Fax:  [phone]                                       Danni Alvarez   MRN: [id number]    Department:   Emergency Department   Date of Visit:  9/19/2017           After Visit Summary Signature Page     I have received my discharge instructions, and my questions have been answered. I have discussed any challenges I see with this plan with the nurse or doctor.    ..........................................................................................................................................  Patient/Patient Representative Signature      ..........................................................................................................................................  Patient Representative Print Name and Relationship to Patient    ..................................................               ................................................  Date                                            Time    ..........................................................................................................................................  Reviewed by Signature/Title    ...................................................              ..............................................  Date                                                            Time

## 2017-09-19 NOTE — ED NOTES
Emergency Department Attending Supervision Note  9/19/2017  8:33 AM      I evaluated this patient in conjunction with Rekha JARQUIN      Briefly, the patient presented with  weakness      On my exam, unremarkable- no focal findings. Patient feeling better after IV fluids. Labs unremarkable    My impression is weakness, no sign of stroke, cardiac etiology, PE, pneumonia    DC to home- FU PMD        Diagnosis    ICD-10-CM    1. Generalized muscle weakness M62.81          Bill Loera MD  09/19/17 0835

## 2017-09-19 NOTE — ED AVS SNAPSHOT
Emergency Department    [address] [facility] [address]    Phone:  [phone]    Fax:  [phone]                                       Danni Alvarez   MRN: [id number]    Department:   Emergency Department   Date of Visit:  9/19/2017           Patient Information     Date Of Birth          1/16/1933        Your diagnoses for this visit were:     Generalized muscle weakness        You were seen by Bill Chester MD.      Follow-up Information     Follow up with Willow Phelan MD In 2 days.    Specialty:  Family Practice    Why:  recheck    Contact information:    [address]  [address]  [facility] [address]  [phone]          Follow up with  Emergency Department.    Specialty:  EMERGENCY MEDICINE    Why:  If symptoms worsen    Contact information:    [address] [facility] [address] [phone]        Discharge Instructions         Dehydration (Adult)  Dehydration occurs when your body loses too much fluid. This may be the result of prolonged vomiting or diarrhea, excessive sweating, or a high fever. It may also happen if you don t drink enough fluid when you re sick or out in the heat. Misuse of diuretics (water pills) can also be a cause.  Symptoms include thirst and decreased urine output. You may also feel dizzy, weak, fatigued, or very drowsy. The diet described below is usually enough to treat dehydration. In some cases, you may need medicine.  Home care    Drink at least 12 8-ounce glasses of fluid every day to resolve the dehydration. Fluid may include water; orange juice; lemonade; apple, grape, or cranberry juice; clear fruit drinks; electrolyte replacement and sports drinks; and teas and coffee without caffeine. If you have been diagnosed with a kidney disease, ask your doctor how much and what types of fluids you should drink to prevent dehydration. If you have kidney disease, fluid can build up in the body. This can be dangerous to your  health.    If you have a fever, muscle aches, or a headache as a result of a cold or flu, you may take acetaminophen or ibuprofen, unless another medicine was prescribed. If you have chronic liver or kidney disease, or have ever had a stomach ulcer or gastrointestinal bleeding, talk with your health care provider before using these medicines. Don't take aspirin if you are younger than 18 and have a fever. Aspirin raises the chance for severe liver injury.  Follow-up care  Follow up with your health care provider, or as advised.  When to seek medical advice  Call your health care provider right away if any of these occur:    Continued vomiting    Frequent diarrhea (more than 5 times a day); blood (red or black color) or mucus in diarrhea    Blood in vomit or stool    Swollen abdomen or increasing abdominal pain    Weakness, dizziness, or fainting    Unusual drowsiness or confusion    Reduced urine output or extreme thirst    Fever of 100.4 F (34 C) or higher  Date Last Reviewed: 5/31/2015 2000-2017 [employer]. [address] [place] [address]. All rights reserved. This information is not intended as a substitute for professional medical care. Always follow your healthcare professional's instructions.        Weakness (Uncertain Cause)  Based on your exam today, the exact cause of your weakness is not certain. However, your weakness does not seem to be a sign of a serious illness at this time. Keep an eye on your symptoms and get medical advice as instructed below.  Home care    Rest at home today. Do not over-exert yourself.    Take any medicine as prescribed.    For the next few days, drink extra fluids (unless your healthcare provider wants you to restrict fluids for other reasons). Do not skip meals.  Follow-up care  Follow up with your healthcare provider or as advised.  When to seek medical advice  Call your healthcare provider for any of the following    Worsening of your  symptoms    Symptoms don't start getting better within 2 days    Fever of 100.4  F (38  C) or higher, or as directed by your healthcare provider     Call 911  Get emergency medical care for any of these:    Chest, arm, neck, jaw or upper back pain    Trouble breathing    Numbness or weakness of the face, one arm or one leg    Slurred speech, confusion, trouble speaking, walking or seeing    Blood in vomit or stool (black or red color)    Loss of consciousness  Date Last Reviewed: 6/10/2015    9549-1054 [employer]. [address] [place] [address]. All rights reserved. This information is not intended as a substitute for professional medical care. Always follow your healthcare professional's instructions.          24 Hour Appointment Hotline       To make an appointment at any [facility], call [phone] ([phone]). If you don't have a family doctor or clinic, we will help you find one. [place] clinics are conveniently located to serve the needs of you and your family.             Review of your medicines      START taking        Dose / Directions Last dose taken    ondansetron 4 MG ODT tab   Commonly known as:  ZOFRAN ODT   Dose:  4 mg   Quantity:  10 tablet        Take 1 tablet (4 mg) by mouth every 8 hours as needed for nausea   Refills:  0          Our records show that you are taking the medicines listed below. If these are incorrect, please call your family doctor or clinic.        Dose / Directions Last dose taken    ARICEPT PO   Dose:  10 mg        Take 10 mg by mouth At Bedtime   Refills:  0        erythromycin ophthalmic ointment   Commonly known as:  ROMYCIN   Quantity:  3.5 g        Apply small amount to inner lower lid of operative eye(s) at bedtime for 7 days and as needed   Refills:  10        LEVOTHROID PO   Dose:  50 mcg        Take 50 mcg by mouth   Refills:  0        LIPITOR 10 MG tablet   Generic drug:  atorvastatin        1 TABLET DAILY   Refills:  0         neomycin-polymyxin-dexamethasone [id number] Susp ophthalmic susp   Commonly known as:  MAXITROL   Quantity:  1 Bottle        Place 1 drop in operative eye(s) 3 times daily for 10 days   Refills:  11        traMADol 50 MG tablet   Commonly known as:  ULTRAM   Dose:   mg   Quantity:  30 tablet        Take 1-2 tablets ( mg) by mouth every 6 hours as needed for moderate pain   Refills:  0        XANAX PO   Dose:  0.25 mg        Take 0.25 mg by mouth 2 times daily   Refills:  0                Prescriptions were sent or printed at these locations (1 Prescription)                   Other Prescriptions                Printed at Department/Unit printer (1 of 1)         ondansetron (ZOFRAN ODT) 4 MG ODT tab                Procedures and tests performed during your visit     CBC with platelets differential    Comprehensive metabolic panel    EKG 12 lead    TSH with free T4 reflex    UA with Microscopic reflex to Culture      Orders Needing Specimen Collection     None      Pending Results     Date and Time Order Name Status Description    9/19/2017 0622 EKG 12 lead Preliminary             Pending Culture Results     No orders found from 9/17/2017 to 9/20/2017.            Pending Results Instructions     If you had any lab results that were not finalized at the time of your Discharge, you can call the ED Lab Result RN at [phone]. You will be contacted by this team for any positive Lab results or changes in treatment. The nurses are available 7 days a week from 10A to 6:30P.  You can leave a message 24 hours per day and they will return your call.        Test Results From Your Hospital Stay        9/19/2017  6:47 AM      Component Results     Component Value Ref Range & Units Status    WBC 3.4 (L) 4.0 - 11.0 10e9/L Final    RBC Count 4.04 3.8 - 5.2 10e12/L Final    Hemoglobin 13.5 11.7 - 15.7 g/dL Final    Hematocrit 39.7 35.0 - 47.0 % Final    MCV 98 78 - 100 fl Final    MCH 33.4 (H) 26.5 - 33.0 pg Final     MCHC 34.0 31.5 - 36.5 g/dL Final    RDW 14.1 10.0 - 15.0 % Final    Platelet Count 184 150 - 450 10e9/L Final    Diff Method Automated Method  Final    % Neutrophils 66.4 % Final    % Lymphocytes 28.8 % Final    % Monocytes 4.5 % Final    % Eosinophils 0.3 % Final    % Basophils 0.0 % Final    % Immature Granulocytes 0.0 % Final    Nucleated RBCs 0 0 /100 Final    Absolute Neutrophil 2.2 1.6 - 8.3 10e9/L Final    Absolute Lymphocytes 1.0 0.8 - 5.3 10e9/L Final    Absolute Monocytes 0.2 0.0 - 1.3 10e9/L Final    Absolute Eosinophils 0.0 0.0 - 0.7 10e9/L Final    Absolute Basophils 0.0 0.0 - 0.2 10e9/L Final    Abs Immature Granulocytes 0.0 0 - 0.4 10e9/L Final    Absolute Nucleated RBC 0.0  Final         9/19/2017  7:08 AM      Component Results     Component Value Ref Range & Units Status    Sodium 139 133 - 144 mmol/L Final    Potassium 4.2 3.4 - 5.3 mmol/L Final    Chloride 103 94 - 109 mmol/L Final    Carbon Dioxide 27 20 - 32 mmol/L Final    Anion Gap 9 3 - 14 mmol/L Final    Glucose 152 (H) 70 - 99 mg/dL Final    Urea Nitrogen 15 7 - 30 mg/dL Final    Creatinine 0.54 0.52 - 1.04 mg/dL Final    GFR Estimate >90 >60 mL/min/1.7m2 Final    Non  GFR Calc    GFR Estimate If Black >90 >60 mL/min/1.7m2 Final    African American GFR Calc    Calcium 7.9 (L) 8.5 - 10.1 mg/dL Final    Bilirubin Total 0.5 0.2 - 1.3 mg/dL Final    Albumin 3.1 (L) 3.4 - 5.0 g/dL Final    Protein Total 6.0 (L) 6.8 - 8.8 g/dL Final    Alkaline Phosphatase 52 40 - 150 U/L Final    ALT 24 0 - 50 U/L Final    AST 22 0 - 45 U/L Final         9/19/2017  7:51 AM      Component Results     Component Value Ref Range & Units Status    Color Urine Yellow  Final    Appearance Urine Clear  Final    Glucose Urine Negative NEG^Negative mg/dL Final    Bilirubin Urine Negative NEG^Negative Final    Ketones Urine Negative NEG^Negative mg/dL Final    Specific Gravity Urine 1.010 1.003 - 1.035 Final    Blood Urine Negative NEG^Negative Final    pH  Urine 5.0 5.0 - 7.0 pH Final    Protein Albumin Urine 10 (A) NEG^Negative mg/dL Final    Urobilinogen mg/dL Normal 0.0 - 2.0 mg/dL Final    Nitrite Urine Negative NEG^Negative Final    Leukocyte Esterase Urine Small (A) NEG^Negative Final    Source Midstream Urine  Final    WBC Urine 1 0 - 2 /HPF Final    RBC Urine 0 0 - 2 /HPF Final    Squamous Epithelial /HPF Urine <1 0 - 1 /HPF Final    Mucous Urine Present (A) NEG^Negative /LPF Final         9/19/2017  7:14 AM      Component Results     Component Value Ref Range & Units Status    TSH 0.89 0.40 - 4.00 mU/L Final                Clinical Quality Measure: Blood Pressure Screening     Your blood pressure was checked while you were in the emergency department today. The last reading we obtained was  BP: 152/75 . Please read the guidelines below about what these numbers mean and what you should do about them.  If your systolic blood pressure (the top number) is less than 120 and your diastolic blood pressure (the bottom number) is less than 80, then your blood pressure is normal. There is nothing more that you need to do about it.  If your systolic blood pressure (the top number) is 120-139 or your diastolic blood pressure (the bottom number) is 80-89, your blood pressure may be higher than it should be. You should have your blood pressure rechecked within a year by a primary care provider.  If your systolic blood pressure (the top number) is 140 or greater or your diastolic blood pressure (the bottom number) is 90 or greater, you may have high blood pressure. High blood pressure is treatable, but if left untreated over time it can put you at risk for heart attack, stroke, or kidney failure. You should have your blood pressure rechecked by a primary care provider within the next 4 weeks.  If your provider in the emergency department today gave you specific instructions to follow-up with your doctor or provider even sooner than that, you should follow that instruction  "and not wait for up to 4 weeks for your follow-up visit.        Thank you for choosing [facility]       Thank you for choosing [facility] for your care. Our goal is always to provide you with excellent care. Hearing back from our patients is one way we can continue to improve our services. Please take a few minutes to complete the written survey that you may receive in the mail after you visit with us. Thank you!        [employer] Information     [employer] lets you send messages to your doctor, view your test results, renew your prescriptions, schedule appointments and more. To sign up, go to [email or web address] . Click on \"Log in\" on the left side of the screen, which will take you to the Welcome page. Then click on \"Sign up Now\" on the right side of the page.     You will be asked to enter the access code listed below, as well as some personal information. Please follow the directions to create your username and password.     Your access code is: [email or web address]  Expires: 2017  8:35 AM     Your access code will  in 90 days. If you need help or a new code, please call your [facility] clinic or [phone].        Care EveryWhere ID     This is your Care EveryWhere ID. This could be used by other organizations to access your [facility] medical records  [id number]        Equal Access to Services     RODOLFO PONCE : Eneida Ortiz, waaxda luqadaha, qaybta kaalmada sherin, mahin oneil . [facility] [phone].    ATENCIÓN: Si habla español, tiene a dow disposición servicios gratuitos de asistencia lingüística. Llame al [phone].    We comply with applicable federal civil rights laws and [place] laws. We do not discriminate on the basis of race, color, national origin, age, disability sex, sexual orientation or gender identity.            After Visit Summary       This is your record. Keep this with you and show to your community pharmacist(s) and doctor(s) at your " next visit.

## 2017-09-19 NOTE — DISCHARGE INSTRUCTIONS
Dehydration (Adult)  Dehydration occurs when your body loses too much fluid. This may be the result of prolonged vomiting or diarrhea, excessive sweating, or a high fever. It may also happen if you don t drink enough fluid when you re sick or out in the heat. Misuse of diuretics (water pills) can also be a cause.  Symptoms include thirst and decreased urine output. You may also feel dizzy, weak, fatigued, or very drowsy. The diet described below is usually enough to treat dehydration. In some cases, you may need medicine.  Home care    Drink at least 12 8-ounce glasses of fluid every day to resolve the dehydration. Fluid may include water; orange juice; lemonade; apple, grape, or cranberry juice; clear fruit drinks; electrolyte replacement and sports drinks; and teas and coffee without caffeine. If you have been diagnosed with a kidney disease, ask your doctor how much and what types of fluids you should drink to prevent dehydration. If you have kidney disease, fluid can build up in the body. This can be dangerous to your health.    If you have a fever, muscle aches, or a headache as a result of a cold or flu, you may take acetaminophen or ibuprofen, unless another medicine was prescribed. If you have chronic liver or kidney disease, or have ever had a stomach ulcer or gastrointestinal bleeding, talk with your health care provider before using these medicines. Don't take aspirin if you are younger than 18 and have a fever. Aspirin raises the chance for severe liver injury.  Follow-up care  Follow up with your health care provider, or as advised.  When to seek medical advice  Call your health care provider right away if any of these occur:    Continued vomiting    Frequent diarrhea (more than 5 times a day); blood (red or black color) or mucus in diarrhea    Blood in vomit or stool    Swollen abdomen or increasing abdominal pain    Weakness, dizziness, or fainting    Unusual drowsiness or confusion    Reduced urine  output or extreme thirst    Fever of 100.4 F (34 C) or higher  Date Last Reviewed: 5/31/2015 2000-2017 [employer]. [address] [place] [address]. All rights reserved. This information is not intended as a substitute for professional medical care. Always follow your healthcare professional's instructions.        Weakness (Uncertain Cause)  Based on your exam today, the exact cause of your weakness is not certain. However, your weakness does not seem to be a sign of a serious illness at this time. Keep an eye on your symptoms and get medical advice as instructed below.  Home care    Rest at home today. Do not over-exert yourself.    Take any medicine as prescribed.    For the next few days, drink extra fluids (unless your healthcare provider wants you to restrict fluids for other reasons). Do not skip meals.  Follow-up care  Follow up with your healthcare provider or as advised.  When to seek medical advice  Call your healthcare provider for any of the following    Worsening of your symptoms    Symptoms don't start getting better within 2 days    Fever of 100.4  F (38  C) or higher, or as directed by your healthcare provider     Call 911  Get emergency medical care for any of these:    Chest, arm, neck, jaw or upper back pain    Trouble breathing    Numbness or weakness of the face, one arm or one leg    Slurred speech, confusion, trouble speaking, walking or seeing    Blood in vomit or stool (black or red color)    Loss of consciousness  Date Last Reviewed: 6/10/2015    2776-1162 [employer]. [address] [place] [address]. All rights reserved. This information is not intended as a substitute for professional medical care. Always follow your healthcare professional's instructions.

## 2017-09-20 LAB — INTERPRETATION ECG - MUSE: NORMAL

## 2019-05-15 ENCOUNTER — APPOINTMENT (OUTPATIENT)
Dept: GENERAL RADIOLOGY | Facility: CLINIC | Age: 84
DRG: 564 | End: 2019-05-15
Attending: EMERGENCY MEDICINE
Payer: COMMERCIAL

## 2019-05-15 ENCOUNTER — APPOINTMENT (OUTPATIENT)
Dept: MRI IMAGING | Facility: CLINIC | Age: 84
DRG: 564 | End: 2019-05-15
Attending: INTERNAL MEDICINE
Payer: COMMERCIAL

## 2019-05-15 ENCOUNTER — HOSPITAL ENCOUNTER (INPATIENT)
Facility: CLINIC | Age: 84
LOS: 3 days | Discharge: NURSING, REHAB OR LONG-TERM CARE | DRG: 564 | End: 2019-05-18
Attending: EMERGENCY MEDICINE | Admitting: INTERNAL MEDICINE
Payer: COMMERCIAL

## 2019-05-15 ENCOUNTER — APPOINTMENT (OUTPATIENT)
Dept: CT IMAGING | Facility: CLINIC | Age: 84
DRG: 564 | End: 2019-05-15
Attending: EMERGENCY MEDICINE
Payer: COMMERCIAL
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
## 2019-05-15 DIAGNOSIS — S42.001A CLOSED NONDISPLACED FRACTURE OF RIGHT CLAVICLE, UNSPECIFIED PART OF CLAVICLE, INITIAL ENCOUNTER: ICD-10-CM

## 2019-05-15 DIAGNOSIS — S20.211A CHEST WALL CONTUSION, RIGHT, INITIAL ENCOUNTER: ICD-10-CM

## 2019-05-15 DIAGNOSIS — S42.101A CLOSED FRACTURE OF RIGHT SCAPULA, UNSPECIFIED PART OF SCAPULA, INITIAL ENCOUNTER: ICD-10-CM

## 2019-05-15 DIAGNOSIS — M25.511 ACUTE PAIN OF RIGHT SHOULDER: ICD-10-CM

## 2019-05-15 DIAGNOSIS — T79.6XXA TRAUMATIC RHABDOMYOLYSIS, INITIAL ENCOUNTER (H): ICD-10-CM

## 2019-05-15 DIAGNOSIS — S22.41XA CLOSED FRACTURE OF MULTIPLE RIBS OF RIGHT SIDE, INITIAL ENCOUNTER: ICD-10-CM

## 2019-05-15 DIAGNOSIS — R41.0 CONFUSION: ICD-10-CM

## 2019-05-15 DIAGNOSIS — F41.1 ANXIETY STATE: Primary | ICD-10-CM

## 2019-05-15 PROBLEM — M62.82 RHABDOMYOLYSIS: Status: ACTIVE | Noted: 2019-05-15

## 2019-05-15 LAB
ALBUMIN UR-MCNC: 30 MG/DL
ANION GAP SERPL CALCULATED.3IONS-SCNC: 7 MMOL/L (ref 3–14)
APPEARANCE UR: CLEAR
APTT PPP: 22 SEC (ref 22–37)
BACTERIA #/AREA URNS HPF: ABNORMAL /HPF
BASOPHILS # BLD AUTO: 0 10E9/L (ref 0–0.2)
BASOPHILS NFR BLD AUTO: 0.1 %
BILIRUB UR QL STRIP: NEGATIVE
BUN SERPL-MCNC: 27 MG/DL (ref 7–30)
CALCIUM SERPL-MCNC: 8.9 MG/DL (ref 8.5–10.1)
CHLORIDE SERPL-SCNC: 105 MMOL/L (ref 94–109)
CK SERPL-CCNC: 2379 U/L (ref 30–225)
CO2 SERPL-SCNC: 27 MMOL/L (ref 20–32)
COLOR UR AUTO: YELLOW
CREAT SERPL-MCNC: 0.68 MG/DL (ref 0.52–1.04)
DIFFERENTIAL METHOD BLD: ABNORMAL
EOSINOPHIL # BLD AUTO: 0 10E9/L (ref 0–0.7)
EOSINOPHIL NFR BLD AUTO: 0 %
ERYTHROCYTE [DISTWIDTH] IN BLOOD BY AUTOMATED COUNT: 15 % (ref 10–15)
GFR SERPL CREATININE-BSD FRML MDRD: 79 ML/MIN/{1.73_M2}
GLUCOSE BLDC GLUCOMTR-MCNC: 167 MG/DL (ref 70–99)
GLUCOSE SERPL-MCNC: 167 MG/DL (ref 70–99)
GLUCOSE UR STRIP-MCNC: NEGATIVE MG/DL
HCT VFR BLD AUTO: 38.5 % (ref 35–47)
HGB BLD-MCNC: 12.9 G/DL (ref 11.7–15.7)
HGB UR QL STRIP: ABNORMAL
IMM GRANULOCYTES # BLD: 0 10E9/L (ref 0–0.4)
IMM GRANULOCYTES NFR BLD: 0.4 %
INR PPP: 0.94 (ref 0.86–1.14)
INTERPRETATION ECG - MUSE: NORMAL
KETONES UR STRIP-MCNC: 10 MG/DL
LEUKOCYTE ESTERASE UR QL STRIP: ABNORMAL
LYMPHOCYTES # BLD AUTO: 1.3 10E9/L (ref 0.8–5.3)
LYMPHOCYTES NFR BLD AUTO: 16.8 %
MCH RBC QN AUTO: 34.2 PG (ref 26.5–33)
MCHC RBC AUTO-ENTMCNC: 33.5 G/DL (ref 31.5–36.5)
MCV RBC AUTO: 102 FL (ref 78–100)
MONOCYTES # BLD AUTO: 0.7 10E9/L (ref 0–1.3)
MONOCYTES NFR BLD AUTO: 8.2 %
MUCOUS THREADS #/AREA URNS LPF: PRESENT /LPF
NEUTROPHILS # BLD AUTO: 5.9 10E9/L (ref 1.6–8.3)
NEUTROPHILS NFR BLD AUTO: 74.5 %
NITRATE UR QL: NEGATIVE
NRBC # BLD AUTO: 0 10*3/UL
NRBC BLD AUTO-RTO: 0 /100
PH UR STRIP: 6 PH (ref 5–7)
PLATELET # BLD AUTO: 181 10E9/L (ref 150–450)
POTASSIUM SERPL-SCNC: 3.7 MMOL/L (ref 3.4–5.3)
RBC # BLD AUTO: 3.77 10E12/L (ref 3.8–5.2)
RBC #/AREA URNS AUTO: 1 /HPF (ref 0–2)
SODIUM SERPL-SCNC: 139 MMOL/L (ref 133–144)
SOURCE: ABNORMAL
SP GR UR STRIP: 1.02 (ref 1–1.03)
SQUAMOUS #/AREA URNS AUTO: 5 /HPF (ref 0–1)
TROPONIN I SERPL-MCNC: <0.015 UG/L (ref 0–0.04)
TROPONIN I SERPL-MCNC: <0.015 UG/L (ref 0–0.04)
TSH SERPL DL<=0.005 MIU/L-ACNC: 1.12 MU/L (ref 0.4–4)
UROBILINOGEN UR STRIP-MCNC: NORMAL MG/DL (ref 0–2)
WBC # BLD AUTO: 8 10E9/L (ref 4–11)
WBC #/AREA URNS AUTO: 6 /HPF (ref 0–5)

## 2019-05-15 PROCEDURE — 00000146 ZZHCL STATISTIC GLUCOSE BY METER IP

## 2019-05-15 PROCEDURE — 25500064 ZZH RX 255 OP 636: Performed by: INTERNAL MEDICINE

## 2019-05-15 PROCEDURE — 71046 X-RAY EXAM CHEST 2 VIEWS: CPT

## 2019-05-15 PROCEDURE — 25000128 H RX IP 250 OP 636: Performed by: EMERGENCY MEDICINE

## 2019-05-15 PROCEDURE — 80048 BASIC METABOLIC PNL TOTAL CA: CPT | Performed by: EMERGENCY MEDICINE

## 2019-05-15 PROCEDURE — 73030 X-RAY EXAM OF SHOULDER: CPT | Mod: RT

## 2019-05-15 PROCEDURE — 84443 ASSAY THYROID STIM HORMONE: CPT | Performed by: EMERGENCY MEDICINE

## 2019-05-15 PROCEDURE — 96360 HYDRATION IV INFUSION INIT: CPT

## 2019-05-15 PROCEDURE — 93005 ELECTROCARDIOGRAM TRACING: CPT

## 2019-05-15 PROCEDURE — 81001 URINALYSIS AUTO W/SCOPE: CPT | Performed by: EMERGENCY MEDICINE

## 2019-05-15 PROCEDURE — 96361 HYDRATE IV INFUSION ADD-ON: CPT

## 2019-05-15 PROCEDURE — A9585 GADOBUTROL INJECTION: HCPCS | Performed by: INTERNAL MEDICINE

## 2019-05-15 PROCEDURE — 12000000 ZZH R&B MED SURG/OB

## 2019-05-15 PROCEDURE — 25000125 ZZHC RX 250: Performed by: EMERGENCY MEDICINE

## 2019-05-15 PROCEDURE — 25800030 ZZH RX IP 258 OP 636: Performed by: INTERNAL MEDICINE

## 2019-05-15 PROCEDURE — 25000132 ZZH RX MED GY IP 250 OP 250 PS 637: Performed by: INTERNAL MEDICINE

## 2019-05-15 PROCEDURE — 68300005 ZZH TRAUMA EVALUATION W/O CC LEVEL III

## 2019-05-15 PROCEDURE — 85025 COMPLETE CBC W/AUTO DIFF WBC: CPT | Performed by: EMERGENCY MEDICINE

## 2019-05-15 PROCEDURE — 70450 CT HEAD/BRAIN W/O DYE: CPT

## 2019-05-15 PROCEDURE — 85730 THROMBOPLASTIN TIME PARTIAL: CPT | Performed by: EMERGENCY MEDICINE

## 2019-05-15 PROCEDURE — 99221 1ST HOSP IP/OBS SF/LOW 40: CPT | Performed by: PSYCHIATRY & NEUROLOGY

## 2019-05-15 PROCEDURE — 99221 1ST HOSP IP/OBS SF/LOW 40: CPT | Performed by: SURGERY

## 2019-05-15 PROCEDURE — 72170 X-RAY EXAM OF PELVIS: CPT

## 2019-05-15 PROCEDURE — 99285 EMERGENCY DEPT VISIT HI MDM: CPT | Mod: 25

## 2019-05-15 PROCEDURE — 70498 CT ANGIOGRAPHY NECK: CPT

## 2019-05-15 PROCEDURE — 70553 MRI BRAIN STEM W/O & W/DYE: CPT

## 2019-05-15 PROCEDURE — 82550 ASSAY OF CK (CPK): CPT | Performed by: EMERGENCY MEDICINE

## 2019-05-15 PROCEDURE — 99223 1ST HOSP IP/OBS HIGH 75: CPT | Mod: AI | Performed by: INTERNAL MEDICINE

## 2019-05-15 PROCEDURE — 71250 CT THORAX DX C-: CPT

## 2019-05-15 PROCEDURE — 84484 ASSAY OF TROPONIN QUANT: CPT | Performed by: EMERGENCY MEDICINE

## 2019-05-15 PROCEDURE — 85610 PROTHROMBIN TIME: CPT | Performed by: EMERGENCY MEDICINE

## 2019-05-15 RX ORDER — ONDANSETRON 4 MG/1
4 TABLET, ORALLY DISINTEGRATING ORAL EVERY 6 HOURS PRN
Status: DISCONTINUED | OUTPATIENT
Start: 2019-05-15 | End: 2019-05-18 | Stop reason: HOSPADM

## 2019-05-15 RX ORDER — NALOXONE HYDROCHLORIDE 0.4 MG/ML
.1-.4 INJECTION, SOLUTION INTRAMUSCULAR; INTRAVENOUS; SUBCUTANEOUS
Status: DISCONTINUED | OUTPATIENT
Start: 2019-05-15 | End: 2019-05-18 | Stop reason: HOSPADM

## 2019-05-15 RX ORDER — ACETAMINOPHEN 650 MG/1
650 SUPPOSITORY RECTAL EVERY 4 HOURS PRN
Status: DISCONTINUED | OUTPATIENT
Start: 2019-05-15 | End: 2019-05-18 | Stop reason: HOSPADM

## 2019-05-15 RX ORDER — SODIUM CHLORIDE, SODIUM LACTATE, POTASSIUM CHLORIDE, CALCIUM CHLORIDE 600; 310; 30; 20 MG/100ML; MG/100ML; MG/100ML; MG/100ML
INJECTION, SOLUTION INTRAVENOUS CONTINUOUS
Status: DISCONTINUED | OUTPATIENT
Start: 2019-05-15 | End: 2019-05-18 | Stop reason: HOSPADM

## 2019-05-15 RX ORDER — ACETAMINOPHEN 325 MG/1
650 TABLET ORAL EVERY 4 HOURS PRN
Status: DISCONTINUED | OUTPATIENT
Start: 2019-05-15 | End: 2019-05-18 | Stop reason: HOSPADM

## 2019-05-15 RX ORDER — DONEPEZIL HYDROCHLORIDE 10 MG/1
10 TABLET, FILM COATED ORAL AT BEDTIME
Status: DISCONTINUED | OUTPATIENT
Start: 2019-05-15 | End: 2019-05-18 | Stop reason: HOSPADM

## 2019-05-15 RX ORDER — GADOBUTROL 604.72 MG/ML
5 INJECTION INTRAVENOUS ONCE
Status: COMPLETED | OUTPATIENT
Start: 2019-05-15 | End: 2019-05-15

## 2019-05-15 RX ORDER — LEVOTHYROXINE SODIUM 50 UG/1
50 TABLET ORAL DAILY
Status: DISCONTINUED | OUTPATIENT
Start: 2019-05-15 | End: 2019-05-18 | Stop reason: HOSPADM

## 2019-05-15 RX ORDER — ONDANSETRON 2 MG/ML
4 INJECTION INTRAMUSCULAR; INTRAVENOUS EVERY 6 HOURS PRN
Status: DISCONTINUED | OUTPATIENT
Start: 2019-05-15 | End: 2019-05-18 | Stop reason: HOSPADM

## 2019-05-15 RX ORDER — AMOXICILLIN 250 MG
2 CAPSULE ORAL 2 TIMES DAILY PRN
Status: DISCONTINUED | OUTPATIENT
Start: 2019-05-15 | End: 2019-05-18 | Stop reason: HOSPADM

## 2019-05-15 RX ORDER — OXYCODONE HYDROCHLORIDE 5 MG/1
5 TABLET ORAL EVERY 4 HOURS PRN
Status: DISCONTINUED | OUTPATIENT
Start: 2019-05-15 | End: 2019-05-18 | Stop reason: HOSPADM

## 2019-05-15 RX ORDER — IOPAMIDOL 755 MG/ML
120 INJECTION, SOLUTION INTRAVASCULAR ONCE
Status: COMPLETED | OUTPATIENT
Start: 2019-05-15 | End: 2019-05-15

## 2019-05-15 RX ORDER — ATORVASTATIN CALCIUM 10 MG/1
10 TABLET, FILM COATED ORAL DAILY
Status: DISCONTINUED | OUTPATIENT
Start: 2019-05-15 | End: 2019-05-15

## 2019-05-15 RX ORDER — AMOXICILLIN 250 MG
1 CAPSULE ORAL 2 TIMES DAILY PRN
Status: DISCONTINUED | OUTPATIENT
Start: 2019-05-15 | End: 2019-05-18 | Stop reason: HOSPADM

## 2019-05-15 RX ORDER — ATORVASTATIN CALCIUM 10 MG/1
10 TABLET, FILM COATED ORAL DAILY
Status: ON HOLD | COMMUNITY
End: 2019-05-18

## 2019-05-15 RX ADMIN — SODIUM CHLORIDE 1000 ML: 9 INJECTION, SOLUTION INTRAVENOUS at 11:38

## 2019-05-15 RX ADMIN — GADOBUTROL 5 ML: 604.72 INJECTION INTRAVENOUS at 23:01

## 2019-05-15 RX ADMIN — SODIUM CHLORIDE 100 ML: 9 INJECTION, SOLUTION INTRAVENOUS at 10:46

## 2019-05-15 RX ADMIN — LEVOTHYROXINE SODIUM 50 MCG: 50 TABLET ORAL at 16:11

## 2019-05-15 RX ADMIN — IOPAMIDOL 70 ML: 755 INJECTION, SOLUTION INTRAVENOUS at 10:45

## 2019-05-15 RX ADMIN — DONEPEZIL HYDROCHLORIDE 10 MG: 10 TABLET ORAL at 21:17

## 2019-05-15 RX ADMIN — SODIUM CHLORIDE, POTASSIUM CHLORIDE, SODIUM LACTATE AND CALCIUM CHLORIDE: 600; 310; 30; 20 INJECTION, SOLUTION INTRAVENOUS at 15:24

## 2019-05-15 RX ADMIN — Medication 1 MG: at 23:57

## 2019-05-15 RX ADMIN — ACETAMINOPHEN 650 MG: 325 TABLET, FILM COATED ORAL at 16:11

## 2019-05-15 ASSESSMENT — ACTIVITIES OF DAILY LIVING (ADL)
ADLS_ACUITY_SCORE: 14
ADLS_ACUITY_SCORE: 16

## 2019-05-15 ASSESSMENT — MIFFLIN-ST. JEOR: SCORE: 969.32

## 2019-05-15 NOTE — PLAN OF CARE
OT and PT: Orders rec'd. Ortho consult is not completely in chart yet. Noted NWB RUE but please provide ROM restrictions, if any, for clavicle/scapular fx.

## 2019-05-15 NOTE — ED NOTES
DATE:  5/15/2019   TIME OF RECEIPT FROM LAB:  1115  LAB TEST:  CK  LAB VALUE:  2379  RESULTS GIVEN WITH READ-BACK TO (PROVIDER):  White  TIME LAB VALUE REPORTED TO PROVIDER:   1112

## 2019-05-15 NOTE — H&P
[facility]    History and Physical - Hospitalist Service       Date of Admission:  5/15/2019    Assessment & Plan   Danni Alvarez is a 86 year old female with a history of hypothyroidism, depression, mild cognitive impairment and colon cancer s/p colectomy who presented to the ED on 5/15/2019 for confusion after being found on the ground.  CK was elevated concerning for rhabdomyolysis.      Rhabdomyolysis  Fall   Minimally displaced right 2nd-7th rib fractures  Right scapula fracture   Non-displaced acute clavicle fracture   Patient had an unwitnessed fall 1-2 days ago and was found by her daughter at approximately 1900 yesterday evening.  At that time they were able to get the patient up and in to bed.  Then this morning the patient was complaining of right shoulder pain.  On presentation CK was elevated at 2379 but renal function was normal.  Imaging in the ED showed minimally displaced right 2nd-7th rib fractures and right scapula fracture. CT scan also showed a right clavicle fracture.  X-ray of the pelvis was normal  - Admission to medical bed  - IVF with LR at 75 mL/hr  - Strict I/Os with goal urine output of 30-50 mL/hr  - Repeat CK and BMP in AM   - PRN Tylenol and Oxycodone  - Social work/PT/OT consulted  - Trauma surgery and orthopedic surgery consulted and appreciate their recommendations.    Mild cognitive impairment  Possible encephalopathy, likely metabolic   Patient's daughter states for approximately the past 6 months the patient has had gradually worsening cognitive decline that acutely worsened yesterday evening.  Her daughter felt the patient was not making much since but by the time she went to bed she had improved near her baseline.  Then this morning the patient was confused again but is since back to baseline.  On arrival though a code stroke was called and patient was evaluated by neurology.  Initial CT head with no acute abnormalities.  A CTA of the head/neck showed mild  calcified plaque in the left carotid bifurcation and left vertebral artery V4 segment but no evidence of occlusion or stenosis.  In the ED the patient was evaluated by the stroke neurology team with no plans for tPa  - PTA Aricept  - OT evaluation   - Continue to monitor   - Neurology following and appreciate their assistance.  Plan for MRI of the brain     Hypothyroidism   Last TSH was 0.89 in 2017  - PTA Synthroid  - TSH level ordered    H/o Colon cancer  Status post colectomy. Thought to be curative        Diet: Regular diet   DVT Prophylaxis: Pneumatic Compression Devices  Alan Catheter: not present  Code Status: Full code     Disposition Plan   Expected discharge: 2 - 3 days, recommended to transitional care unit once adequate pain management/ tolerating PO medications and rhabdomyolysis has resolved  Entered: Franky Gordon DO 05/15/2019, 1:37 PM     The patient's care was discussed with the Patient, Patient's Family and ED staff.    Franky Gordon DO  [facility]    ______________________________________________________________________    Chief Complaint   Altered mental status     History is obtained from the patient and daughter.  History is limited as the patient does not remember the recent events    History of Present Illness   Danni Alvarez is a 86 year old female with a history of cognitive impairment who is still living alone presented to the ED after an unwitnessed fall and with altered mental status.  Patient had an unwitnessed fall 1-2 days ago and was found down by her daughter at approximately 1900 yesterday evening.  At that time they were able to get the patient up and in to bed.  Then this morning the patient was complaining of right shoulder pain.  Daughter also thought that the confusion had returned but has since resolved again.  Currently the patient only complains of right shoulder pain.  She denies any chest wall pain, chest pain, SOB.  She also denies any  fevers, chills, cough, headache, abdominal pain, N/V/D, problems with urination, leg pain or leg swelling.      Review of Systems    The 10 point Review of Systems is negative other than noted in the HPI    Past Medical History    I have reviewed this patient's medical history and updated it with pertinent information if needed.   Past Medical History:   Diagnosis Date     Abnormal feces      Abnormal glucose      Anxiety state, unspecified      Hyperlipidemia      Major depressive disorder, single episode, mild (H)      Malignant neoplasm of colon, unspecified site      Mild cognitive impairment, so stated      Unspecified hypothyroidism        Past Surgical History   I have reviewed this patient's surgical history and updated it with pertinent information if needed.  Past Surgical History:   Procedure Laterality Date     CATARACT IOL, RT/LT      right and left     COLECTOMY       HERNIA REPAIR       REPAIR PTOSIS BILATERAL Bilateral 1/21/2016    Procedure: REPAIR PTOSIS BILATERAL;  Surgeon: Jose Stevens MD;  Location: [facility]     TUBAL LIGATION         Social History   I have reviewed this patient's social history and updated it with pertinent information if needed.  Social History     Tobacco Use     Smoking status: Never Smoker     Smokeless tobacco: Never Used   Substance Use Topics     Alcohol use: Yes     Comment: occasionally     Drug use: No       Family History   I have reviewed this patient's family history and updated it with pertinent information if needed.   Heart disease     Prior to Admission Medications   Prior to Admission Medications   Prescriptions Last Dose Informant Patient Reported? Taking?   ALPRAZolam (XANAX PO) 5/13/2019  Yes Yes   Sig: Take 0.25 mg by mouth nightly as needed    Donepezil HCl (ARICEPT PO) 5/13/2019  Yes Yes   Sig: Take 10 mg by mouth At Bedtime   Levothyroxine Sodium (LEVOTHROID PO) 5/14/2019 at Unknown time  Yes Yes   Sig: Take 50 mcg by mouth daily    atorvastatin  (LIPITOR) 10 MG tablet 5/14/2019 at Unknown time  Yes Yes   Sig: Take 10 mg by mouth daily   vitamin B-12 (CYANOCOBALAMIN) 1000 MCG tablet 5/14/2019 at Unknown time  Yes Yes   Sig: Take 2,000 mcg by mouth daily       Facility-Administered Medications: None     Allergies   Allergies   Allergen Reactions     Morphine        Physical Exam   Vital Signs: Temp: 98.3  F (36.8  C) Temp src: Temporal BP: 148/83 Pulse: 89 Heart Rate: 90 Resp: 22 SpO2: 95 % O2 Device: None (Room air)    Weight: 120 lbs 0 oz    General Appearance: Resting comfortably.  NAD   Eyes: EOMI.  Normal conjunctiva  HEENT: NC/AT.  Moist mucous membranes  Respiratory: Clear to auscultation.  No respiratory distress  Cardiovascular: RRR.  No murmurs  GI: Bowel sounds present.  Non-tender  Skin: Multiple bruises noted to lower extremity.  No obvious rashes.  No cyanosis  Musculoskeletal: Right chest wall is tender.  No lower extremity edema.  No calf tenderness  Neurologic: No focal deficits.  CN intact  Psychiatric: Pleasant and alert.  Oriented to person and place but not month     Data   Data reviewed today: I reviewed all medications, new labs and imaging results over the last 24 hours. I personally reviewed the CT results below.   CXR:  No obvious infiltrates or pneumothorax.  Cardiac size normal  X-Ray Pelvis:  No obvious fractures   EKG:   NSR.  Rate 94 BPM.  No ST elevations.    Recent Labs   Lab 05/15/19  1223 05/15/19  1031   WBC  --  8.0   HGB  --  12.9   MCV  --  102*   PLT  --  181   INR  --  0.94   NA  --  139   POTASSIUM  --  3.7   CHLORIDE  --  105   CO2  --  27   BUN  --  27   CR  --  0.68   ANIONGAP  --  7   ELY  --  8.9   GLC  --  167*   TROPI <0.015 <0.015     Recent Results (from the past 24 hour(s))   CT Head w/o Contrast    Narrative    CT SCAN OF THE HEAD WITHOUT CONTRAST   5/15/2019 10:48 AM     HISTORY: Code stroke. Altered mental status, found on the floor last  night, increased confusion, difficulty walking, speech  difficulty.    TECHNIQUE:  Axial images of the head and coronal reformations without  IV contrast material. Radiation dose for this scan was reduced using  automated exposure control, adjustment of the mA and/or kV according  to patient size, or iterative reconstruction technique.    COMPARISON: None.    FINDINGS:  There is generalized atrophy of the brain.  There is low  attenuation in the white matter of the cerebral hemispheres consistent  with sequelae of small vessel ischemic disease. There is no evidence  of intracranial hemorrhage, mass, acute infarct or anomaly.     The visualized portions of the sinuses and mastoids appear normal.  There is no evidence of trauma.       Impression    IMPRESSION:   1. No acute abnormality.  2. Atrophy of the brain.  White matter changes consistent with  sequelae of small vessel ischemic disease.     KYLE BRADLEY MD   CTA Head Neck with Contrast    Narrative    CT ANGIOGRAM OF THE HEAD AND NECK WITHOUT AND WITH CONTRAST  5/15/2019  10:51 AM     HISTORY: Transient ischemic attack, difficulty speaking and walking,  found on the floor, altered mental status.    TECHNIQUE:  Precontrast localizing scans were followed by CT  angiography with an injection of 70 mL Gadavist IV with scans through  the head and neck.  3D post processing was performed, images were  archived to PACS and used in interpretation of this study.  Estimates  of carotid stenoses are made relative to the distal internal carotid  artery diameters except as noted. Radiation dose for this scan was  reduced using automated exposure control, adjustment of the mA and/or  kV according to patient size, or iterative reconstruction technique.    COMPARISON: None.    CT HEAD FINDINGS:  No contrast enhancing lesions.   Cerebral blood  flow is grossly normal.    CT ANGIOGRAM HEAD FINDINGS:  Arteries are widely patent with no  aneurysm, significant stenosis, occlusion or intraarterial thrombus.  There is fetal origin of the  left posterior cerebral artery from the  internal carotid, a normal variant. There is mild calcified  atherosclerotic plaque in the left vertebral artery V4 segment without  significant stenosis. Venous circulation is unremarkable.     CT ANGIOGRAM NECK FINDINGS:   Right carotid artery: Tortuous cervical internal carotid.  No  significant stenosis.      Left carotid artery: Mild calcified plaque in the proximal internal  carotid and distal common carotid arteries. Tortuous distal cervical  internal carotid.  No significant stenosis.      Vertebral arteries:   No significant stenosis.      Other findings: Small right pleural effusion. Multiple small thyroid  nodules. In a patient without known thyroid disease, an incidental  thyroid nodule without microcalcifications measuring <1.0 cm in size  in a patient <35 years old or <1.5 cm in a patient >35 years old is  most likely benign and does not typically require follow-up.      Impression    IMPRESSION: Mild calcified plaque in the left carotid bifurcation and  left vertebral artery V4 segment. No evidence of arterial occlusion or  significant stenosis.    I called the report to Dr. Ko Samuel in the emergency room on  5/15/2019 at 11:02 AM.    KYLE BRADLEY MD   XR Shoulder Right G/E 3 Views    Narrative    RIGHT SHOULDER THREE VIEWS   5/15/2019 11:31 AM     HISTORY: Pain after fall.    COMPARISON: None.      Impression    IMPRESSION:   1. A linear lucency within the inferior scapular body with an  associated small step-off, consistent with an acute fracture. The  entire extent of the fracture is not well-visualized on this  radiograph.  2. Mildly and moderately displaced acute fractures of the lateral  aspects of the right second through seventh ribs.  3. No other visualized acute fractures of the right shoulder region.  4. No dislocation of the right glenohumeral joint.    MICHAEL DIEZ MD   XR Pelvis 1/2 Views    Narrative    PELVIS SINGLE VIEW   5/15/2019  11:31 AM     HISTORY: Fall.    COMPARISON: None.      Impression    IMPRESSION:   1. No visualized acute fracture or malalignment of the pelvis.  2. Mild degenerative changes in bilateral hip joints.    MICHAEL DIEZ MD   XR Chest 2 Views    Narrative    CHEST TWO VIEWS   5/15/2019 11:32 AM     HISTORY: Fall.    COMPARISON: None.    FINDINGS: A few linear opacities in the left lung base likely  represent scarring or atelectasis. Normal-sized cardiac silhouette.  Atherosclerotic calcification in the thoracic aorta. Acute fractures  of the lateral aspects of a few upper right ribs, resulting in mild  concavity of the chest wall. No visualized pneumothorax. Old fracture  of the mid left clavicle.      Impression    IMPRESSION:   1. Acute fractures of the lateral aspects of a few upper right ribs,  resulting in mild concavity of the chest wall.  2. No visualized pneumothorax.    MICHAEL DIEZ MD   Chest CT w/o contrast    Narrative    CT CHEST WITHOUT CONTRAST   5/15/2019 1:49 PM     HISTORY: Multiple rib fractures and possible right scapular fracture  on right shoulder x-ray.    COMPARISON: 12/11/2006-CT abdomen and pelvis.    TECHNIQUE: Without intravenous contrast, helical sections were  acquired through the lungs. Coronal reconstructions were generated.  Radiation dose for this scan was reduced using automated exposure  control, adjustment of the mA and/or kV according to the patient's  size, or iterative reconstruction technique.    FINDINGS: Mildly displaced acute fractures of the posterior aspects of  the right second through sixth ribs and lateral aspects of the right  fourth through seventh ribs. Mildly to moderately displaced acute  fracture of the lateral aspects of the right third rib. Probable acute  mildly displaced fractures of the lateral aspect of the right tenth  rib. Nondisplaced acute fracture of the mid and distal aspects of the  right clavicle. Mildly displaced vertically-oriented acute  fracture of  the scapular body, without involvement of the glenoid process. Partial  visualization of a probable hematoma within the soft tissues overlying  the scapular fracture (for example, series 2 image 24). Old fracture  of the mid to distal left clavicle.    Mild atelectasis in bilateral lung bases. A few very tiny less than  0.4 cm diameter nodules are present in the upper right lung. For  example, there is a 0.2 cm nodule in the lateral aspect of the right  upper lobe (series 4 image 70). Mild diffuse bronchiectasis. A very  tiny focus of gas is present in the right pleural space. A trace  amount of right pleural fluid. No left pleural or pericardial  effusion. No enlarged lymph nodes in the chest. Atherosclerotic  calcification in the thoracic aorta and coronary arteries.    Scan through the upper abdomen is unremarkable.      Impression    IMPRESSION:  1. Acute fractures of the right second through seventh ribs and the  right eleventh rib. A few other fractures are mildly and moderately  displaced. There is a very tiny focus of gas in the right pleural  space and a trace amount of right pleural fluid, but no significant  pneumothorax.  2. Nondisplaced acute vertically-oriented fracture of the scapular  body. Partial visualization of a probable hematoma within the soft  tissues overlying the scapular fracture.  3. Nondisplaced acute fracture of the mid and distal aspects of the  right clavicle.  4. A few tiny indeterminate right lung nodules. These are very likely  benign nodules. Recommend comparison with prior imaging studies, if  available. If not available and the patient is a smoker or has other  significant risk factors, a followup CT scan could be considered in 12  months to confirm stability.

## 2019-05-15 NOTE — CONSULTS
[facility], [place]    Orthopedic Consultation    Danni Alvarez MRN# [id number]   Age: 86 year old YOB: 1933     Date of Admission: 5/15/2019    Reason for consult: Right distal clavicle and scapula fractures       Requesting physician: Franky Gordon MD       Level of consult: One time Consult, place orders           Assessment and Plan:   Assessment:   Right distal clavicle and scapula fractures  Right rib fractures 2-7 and 11      Plan:   Non-surgical management recommended  Sling for comfort  Pain medication as needed, limit narcotics due to cognitive decline  Ice as needed  NWB RUE  Follow-up with Dr Hayes x 2 weeks if medically stable for follow-up xrays.            Chief Complaint:   Right shoulder pain         History of Present Illness:   Patient had an unwitnessed fall 1-2 days ago and was found by her daughter at approximately 1900 yesterday evening.  At that time they were able to get the patient up and in to bed.  Then this morning the patient was complaining of right shoulder pain.  On presentation CK was elevated at 2379 but renal function was normal.  Imaging in the ED showed minimally displaced right 2nd-7th rib fractures and right scapula fracture. CT scan also showed a right distal clavicle fracture.  Orthopedics consulted due to x-ray and CT findings.  Patient unable to provide much history.  She reports she is currently comfortable laying in bed.  Unable to relay if she has had previous shoulder injuries in the past.         Past Medical History:     Past Medical History:   Diagnosis Date     Abnormal feces      Abnormal glucose      Anxiety state, unspecified      Hyperlipidemia      Major depressive disorder, single episode, mild (H)      Malignant neoplasm of colon, unspecified site      Mild cognitive impairment, so stated      Unspecified hypothyroidism              Past Surgical History:     Past Surgical History:   Procedure Laterality Date     CATARACT  IOL, RT/LT      right and left     COLECTOMY       HERNIA REPAIR       REPAIR PTOSIS BILATERAL Bilateral 1/21/2016    Procedure: REPAIR PTOSIS BILATERAL;  Surgeon: Jose Stevens MD;  Location: [facility]     TUBAL LIGATION               Social History:     Social History     Tobacco Use     Smoking status: Never Smoker     Smokeless tobacco: Never Used   Substance Use Topics     Alcohol use: Yes     Comment: occasionally             Family History:   History reviewed. No pertinent family history.          Immunizations:     VACCINE/DOSE   Diptheria   DPT   DTAP   HBIG   Hepatitis A   Hepatitis B   HIB   Influenza   Measles   Meningococcal   MMR   Mumps   Pneumococcal   Polio   Rubella   Small Pox   TDAP   Varicella   Zoster             Allergies:     Allergies   Allergen Reactions     Morphine              Medications:     Current Facility-Administered Medications   Medication     acetaminophen (TYLENOL) Suppository 650 mg     acetaminophen (TYLENOL) tablet 650 mg     donepezil (ARICEPT) tablet 10 mg     lactated ringers infusion     levothyroxine (SYNTHROID/LEVOTHROID) tablet 50 mcg     melatonin tablet 1 mg     naloxone (NARCAN) injection 0.1-0.4 mg     ondansetron (ZOFRAN-ODT) ODT tab 4 mg    Or     ondansetron (ZOFRAN) injection 4 mg     oxyCODONE (ROXICODONE) tablet 5 mg     senna-docusate (SENOKOT-S/PERICOLACE) 8.6-50 MG per tablet 1 tablet    Or     senna-docusate (SENOKOT-S/PERICOLACE) 8.6-50 MG per tablet 2 tablet             Review of Systems:   CV: NEGATIVE for chest pain, palpitations or peripheral edema  C: NEGATIVE for fever, chills, change in weight  E/M: NEGATIVE for ear, mouth and throat problems  R: NEGATIVE for significant cough or SOB          Physical Exam:   All vitals have been reviewed  Patient Vitals for the past 24 hrs:   BP Temp Temp src Pulse Heart Rate Resp SpO2 Height Weight   05/15/19 1419 148/78 99.1  F (37.3  C) Oral -- 93 20 94 % -- --   05/15/19 1141 148/83 -- -- 89 90 22 95 % -- --  "  05/15/19 1036 (!) 164/94 98.3  F (36.8  C) Temporal 105 105 16 96 % 1.626 m (5' 4\") 54.4 kg (120 lb)     No intake or output data in the 24 hours ending 05/15/19 1452  On physical exam of the right upper extremity, patient has significant ecchymosis surrounding the right scapula and proximal lateral chest wall.  Has mild tenderness palpation along the distal clavicle.  There is no skin tenting.  No abrasions seen.  Also has mild tenderness to palpation along the right scapula.  Denies numbness or tingling to light touch on the right versus left upper extremities.  Is able to flex and extend her elbow and wrist without difficulty.  Distal pulses are intact and equal bilaterally.  Denies pain with gentle shoulder range of motion.          Data:   All laboratory data reviewed  Results for orders placed or performed during the hospital encounter of 05/15/19   CT Head w/o Contrast    Narrative    CT SCAN OF THE HEAD WITHOUT CONTRAST   5/15/2019 10:48 AM     HISTORY: Code stroke. Altered mental status, found on the floor last  night, increased confusion, difficulty walking, speech difficulty.    TECHNIQUE:  Axial images of the head and coronal reformations without  IV contrast material. Radiation dose for this scan was reduced using  automated exposure control, adjustment of the mA and/or kV according  to patient size, or iterative reconstruction technique.    COMPARISON: None.    FINDINGS:  There is generalized atrophy of the brain.  There is low  attenuation in the white matter of the cerebral hemispheres consistent  with sequelae of small vessel ischemic disease. There is no evidence  of intracranial hemorrhage, mass, acute infarct or anomaly.     The visualized portions of the sinuses and mastoids appear normal.  There is no evidence of trauma.       Impression    IMPRESSION:   1. No acute abnormality.  2. Atrophy of the brain.  White matter changes consistent with  sequelae of small vessel ischemic disease. "     KYLE BRADLEY MD   CTA Head Neck with Contrast    Narrative    CT ANGIOGRAM OF THE HEAD AND NECK WITHOUT AND WITH CONTRAST  5/15/2019  10:51 AM     HISTORY: Transient ischemic attack, difficulty speaking and walking,  found on the floor, altered mental status.    TECHNIQUE:  Precontrast localizing scans were followed by CT  angiography with an injection of 70 mL Gadavist IV with scans through  the head and neck.  3D post processing was performed, images were  archived to PACS and used in interpretation of this study.  Estimates  of carotid stenoses are made relative to the distal internal carotid  artery diameters except as noted. Radiation dose for this scan was  reduced using automated exposure control, adjustment of the mA and/or  kV according to patient size, or iterative reconstruction technique.    COMPARISON: None.    CT HEAD FINDINGS:  No contrast enhancing lesions.   Cerebral blood  flow is grossly normal.    CT ANGIOGRAM HEAD FINDINGS:  Arteries are widely patent with no  aneurysm, significant stenosis, occlusion or intraarterial thrombus.  There is fetal origin of the left posterior cerebral artery from the  internal carotid, a normal variant. There is mild calcified  atherosclerotic plaque in the left vertebral artery V4 segment without  significant stenosis. Venous circulation is unremarkable.     CT ANGIOGRAM NECK FINDINGS:   Right carotid artery: Tortuous cervical internal carotid.  No  significant stenosis.      Left carotid artery: Mild calcified plaque in the proximal internal  carotid and distal common carotid arteries. Tortuous distal cervical  internal carotid.  No significant stenosis.      Vertebral arteries:   No significant stenosis.      Other findings: Small right pleural effusion. Multiple small thyroid  nodules. In a patient without known thyroid disease, an incidental  thyroid nodule without microcalcifications measuring <1.0 cm in size  in a patient <35 years old or <1.5 cm in a  patient >35 years old is  most likely benign and does not typically require follow-up.      Impression    IMPRESSION: Mild calcified plaque in the left carotid bifurcation and  left vertebral artery V4 segment. No evidence of arterial occlusion or  significant stenosis.    I called the report to Dr. Ko Samuel in the emergency room on  5/15/2019 at 11:02 AM.    KYLE BRADLEY MD   XR Shoulder Right G/E 3 Views    Narrative    RIGHT SHOULDER THREE VIEWS   5/15/2019 11:31 AM     HISTORY: Pain after fall.    COMPARISON: None.      Impression    IMPRESSION:   1. A linear lucency within the inferior scapular body with an  associated small step-off, consistent with an acute fracture. The  entire extent of the fracture is not well-visualized on this  radiograph.  2. Mildly and moderately displaced acute fractures of the lateral  aspects of the right second through seventh ribs.  3. No other visualized acute fractures of the right shoulder region.  4. No dislocation of the right glenohumeral joint.    MICHAEL DIEZ MD   XR Chest 2 Views    Narrative    CHEST TWO VIEWS   5/15/2019 11:32 AM     HISTORY: Fall.    COMPARISON: None.    FINDINGS: A few linear opacities in the left lung base likely  represent scarring or atelectasis. Normal-sized cardiac silhouette.  Atherosclerotic calcification in the thoracic aorta. Acute fractures  of the lateral aspects of a few upper right ribs, resulting in mild  concavity of the chest wall. No visualized pneumothorax. Old fracture  of the mid left clavicle.      Impression    IMPRESSION:   1. Acute fractures of the lateral aspects of a few upper right ribs,  resulting in mild concavity of the chest wall.  2. No visualized pneumothorax.    MICHAEL DIEZ MD   XR Pelvis 1/2 Views    Narrative    PELVIS SINGLE VIEW   5/15/2019 11:31 AM     HISTORY: Fall.    COMPARISON: None.      Impression    IMPRESSION:   1. No visualized acute fracture or malalignment of the pelvis.  2. Mild  degenerative changes in bilateral hip joints.    MICHAEL DIEZ MD   Chest CT w/o contrast    Narrative    CT CHEST WITHOUT CONTRAST   5/15/2019 1:49 PM     HISTORY: Multiple rib fractures and possible right scapular fracture  on right shoulder x-ray.    COMPARISON: 12/11/2006-CT abdomen and pelvis.    TECHNIQUE: Without intravenous contrast, helical sections were  acquired through the lungs. Coronal reconstructions were generated.  Radiation dose for this scan was reduced using automated exposure  control, adjustment of the mA and/or kV according to the patient's  size, or iterative reconstruction technique.    FINDINGS: Mildly displaced acute fractures of the posterior aspects of  the right second through sixth ribs and lateral aspects of the right  fourth through seventh ribs. Mildly to moderately displaced acute  fracture of the lateral aspects of the right third rib. Probable acute  mildly displaced fractures of the lateral aspect of the right tenth  rib. Nondisplaced acute fracture of the mid and distal aspects of the  right clavicle. Mildly displaced vertically-oriented acute fracture of  the scapular body, without involvement of the glenoid process. Partial  visualization of a probable hematoma within the soft tissues overlying  the scapular fracture (for example, series 2 image 24). Old fracture  of the mid to distal left clavicle.    Mild atelectasis in bilateral lung bases. A few very tiny less than  0.4 cm diameter nodules are present in the upper right lung. For  example, there is a 0.2 cm nodule in the lateral aspect of the right  upper lobe (series 4 image 70). Mild diffuse bronchiectasis. A very  tiny focus of gas is present in the right pleural space. A trace  amount of right pleural fluid. No left pleural or pericardial  effusion. No enlarged lymph nodes in the chest. Atherosclerotic  calcification in the thoracic aorta and coronary arteries.    Scan through the upper abdomen is unremarkable.       Impression    IMPRESSION:  1. Acute fractures of the right second through seventh ribs and the  right eleventh rib. A few other fractures are mildly and moderately  displaced. There is a very tiny focus of gas in the right pleural  space and a trace amount of right pleural fluid, but no significant  pneumothorax.  2. Nondisplaced acute vertically-oriented fracture of the scapular  body. Partial visualization of a probable hematoma within the soft  tissues overlying the scapular fracture.  3. Nondisplaced acute fracture of the mid and distal aspects of the  right clavicle.  4. A few tiny indeterminate right lung nodules. These are very likely  benign nodules. Recommend comparison with prior imaging studies, if  available. If not available and the patient is a smoker or has other  significant risk factors, a followup CT scan could be considered in 12  months to confirm stability.   UA with Microscopic   Result Value Ref Range    Color Urine Yellow     Appearance Urine Clear     Glucose Urine Negative NEG^Negative mg/dL    Bilirubin Urine Negative NEG^Negative    Ketones Urine 10 (A) NEG^Negative mg/dL    Specific Gravity Urine 1.020 1.003 - 1.035    Blood Urine Moderate (A) NEG^Negative    pH Urine 6.0 5.0 - 7.0 pH    Protein Albumin Urine 30 (A) NEG^Negative mg/dL    Urobilinogen mg/dL Normal 0.0 - 2.0 mg/dL    Nitrite Urine Negative NEG^Negative    Leukocyte Esterase Urine Small (A) NEG^Negative    Source Midstream Urine     WBC Urine 6 (H) 0 - 5 /HPF    RBC Urine 1 0 - 2 /HPF    Bacteria Urine Few (A) NEG^Negative /HPF    Squamous Epithelial /HPF Urine 5 (H) 0 - 1 /HPF    Mucous Urine Present (A) NEG^Negative /LPF   Basic metabolic panel   Result Value Ref Range    Sodium 139 133 - 144 mmol/L    Potassium 3.7 3.4 - 5.3 mmol/L    Chloride 105 94 - 109 mmol/L    Carbon Dioxide 27 20 - 32 mmol/L    Anion Gap 7 3 - 14 mmol/L    Glucose 167 (H) 70 - 99 mg/dL    Urea Nitrogen 27 7 - 30 mg/dL    Creatinine 0.68 0.52 -  1.04 mg/dL    GFR Estimate 79 >60 mL/min/[1.73_m2]    GFR Estimate If Black >90 >60 mL/min/[1.73_m2]    Calcium 8.9 8.5 - 10.1 mg/dL   CBC with platelets differential   Result Value Ref Range    WBC 8.0 4.0 - 11.0 10e9/L    RBC Count 3.77 (L) 3.8 - 5.2 10e12/L    Hemoglobin 12.9 11.7 - 15.7 g/dL    Hematocrit 38.5 35.0 - 47.0 %     (H) 78 - 100 fl    MCH 34.2 (H) 26.5 - 33.0 pg    MCHC 33.5 31.5 - 36.5 g/dL    RDW 15.0 10.0 - 15.0 %    Platelet Count 181 150 - 450 10e9/L    Diff Method Automated Method     % Neutrophils 74.5 %    % Lymphocytes 16.8 %    % Monocytes 8.2 %    % Eosinophils 0.0 %    % Basophils 0.1 %    % Immature Granulocytes 0.4 %    Nucleated RBCs 0 0 /100    Absolute Neutrophil 5.9 1.6 - 8.3 10e9/L    Absolute Lymphocytes 1.3 0.8 - 5.3 10e9/L    Absolute Monocytes 0.7 0.0 - 1.3 10e9/L    Absolute Eosinophils 0.0 0.0 - 0.7 10e9/L    Absolute Basophils 0.0 0.0 - 0.2 10e9/L    Abs Immature Granulocytes 0.0 0 - 0.4 10e9/L    Absolute Nucleated RBC 0.0    CK total   Result Value Ref Range    CK Total 2,379 (HH) 30 - 225 U/L   INR   Result Value Ref Range    INR 0.94 0.86 - 1.14   Partial thromboplastin time   Result Value Ref Range    PTT 22 22 - 37 sec   Troponin I   Result Value Ref Range    Troponin I ES <0.015 0.000 - 0.045 ug/L   Glucose by meter   Result Value Ref Range    Glucose 167 (H) 70 - 99 mg/dL   Troponin I (now)   Result Value Ref Range    Troponin I ES <0.015 0.000 - 0.045 ug/L   EKG 12-lead, tracing only   Result Value Ref Range    Interpretation ECG Click View Image link to view waveform and result    Neurology IP Consult: Stroke (potential or actual); Patient to be seen: Routine - within 24 hours; Seen in ED for altered mental status; Consultant may enter orders: Yes; Requesting provider? Attending physician    Narrative    Agueda Mahmood MD     5/15/2019  2:40 PM  [facility]      Stroke Consult Note    Reason for Consult:  Stroke Code    HPI  Danni JACKSON  Kim is a 86 year old female found to have altered   mental status. She was last normal 5/12 and 5/13. She was found   down and more confused yesterday evening. Daughter was able to   get her up to ambulate. This morning patient woke up and could   hardly walk and was confused, saying strange things. She was   noted to have right arm weakness and thus stroke code was called.    She does have bruising around the right shoulder.    TPA Treatment   Not given due to outside the time window.    Endovascular Treatment  Not initiated due to absence of proximal vessel occlusion    Impression  # Confusion, question of acute stroke vs metabolic/other   encephalopathy.     # Fall, elevated CK, multiple fractures    Recommendations  - MRI brain to assess for stroke  - q4hr neuro checks  - normal BP goal is okay  - given multiple fractures, will hold off on aspirin until MRI   resulted, and if there is an acute stroke then we would recommend   daily aspirin.    Patient Follow-up    - final recommendation pending work-up      Please contact the Stroke Service with any questions. Seen and   discussed with Dr. Garcia.    Agueda Mahmood MD  Stroke fellow  __________________________________________________    Past Medical History:   Diagnosis Date     Abnormal feces      Abnormal glucose      Anxiety state, unspecified      Hyperlipidemia      Major depressive disorder, single episode, mild (H)      Malignant neoplasm of colon, unspecified site      Mild cognitive impairment, so stated      Unspecified hypothyroidism        Past Surgical History:   Procedure Laterality Date     CATARACT IOL, RT/LT      right and left     COLECTOMY       HERNIA REPAIR       REPAIR PTOSIS BILATERAL Bilateral 1/21/2016    Procedure: REPAIR PTOSIS BILATERAL;  Surgeon: Jose Stevens MD;  Location: [facility]     TUBAL LIGATION         Medications   Current Facility-Administered Medications   Medication     acetaminophen (TYLENOL) Suppository 650 mg      acetaminophen (TYLENOL) tablet 650 mg     atorvastatin (LIPITOR) tablet 10 mg     donepezil (ARICEPT) tablet 10 mg     lactated ringers infusion     levothyroxine (SYNTHROID/LEVOTHROID) tablet 50 mcg     melatonin tablet 1 mg     naloxone (NARCAN) injection 0.1-0.4 mg     ondansetron (ZOFRAN-ODT) ODT tab 4 mg    Or     ondansetron (ZOFRAN) injection 4 mg     oxyCODONE (ROXICODONE) tablet 5 mg     senna-docusate (SENOKOT-S/PERICOLACE) 8.6-50 MG per tablet 1   tablet    Or     senna-docusate (SENOKOT-S/PERICOLACE) 8.6-50 MG per tablet 2   tablet           Allergies   Allergies   Allergen Reactions     Morphine        Family History   non contributory    Social History   Social History     Socioeconomic History     Marital status:      Spouse name: Not on file     Number of children: Not on file     Years of education: Not on file     Highest education level: Not on file   Occupational History     Not on file   Social Needs     Financial resource strain: Not on file     Food insecurity:     Worry: Not on file     Inability: Not on file     Transportation needs:     Medical: Not on file     Non-medical: Not on file   Tobacco Use     Smoking status: Never Smoker     Smokeless tobacco: Never Used   Substance and Sexual Activity     Alcohol use: Yes     Comment: occasionally     Drug use: No     Sexual activity: Not on file   Lifestyle     Physical activity:     Days per week: Not on file     Minutes per session: Not on file     Stress: Not on file   Relationships     Social connections:     Talks on phone: Not on file     Gets together: Not on file     Attends [employer] service: Not on file     Active member of club or organization: Not on file     Attends meetings of clubs or organizations: Not on file     Relationship status: Not on file     Intimate partner violence:     Fear of current or ex partner: Not on file     Emotionally abused: Not on file     Physically abused: Not on file     Forced sexual  activity: Not on file   Other Topics Concern     Not on file   Social History Narrative     Not on file          ROS  Review of systems not obtained due to patient factors - confusion    PHYSICAL EXAMINATION  B/P:     148/83 (05/15/19 1141)    Heart Rate: 90 (05/15/19 1141)      Pulse: 89 (05/15/19 1141)   Resp:  22 (05/15/19 1141)  Temp: 98.3  F (36.8  C)   Temporal (05/15/19 1036)    General:  patient lying in bed without any acute distress    HEENT:  normocephalic/atraumatic  Cardio:  Rate on EKG is NSR  Pulmonary:  no respiratory distress  Abdomen:  non-distended  Extremities:  Bruising right shoulder  Skin:  bruising present     Neurologic  Mental Status:  Awake, alert, not oriented, naming intact  Cranial Nerves:  visual fields intact, EOMI with normal smooth   pursuit, facial sensation intact and symmetric, facial movements   symmetric, palate elevation symmetric and uvula midline, no   dysarthria, tongue protrusion midline  Motor:  normal muscle tone and bulk, no abnormal movements,   restricted movement of the RUE. left side without drift, b/l LE   equal strength. Right  strength is normal, unable to fully   raise but can hold the arm up for 10 seconds.   Reflexes:  defer  Sensory:  Intact to LT  Coordination:  FNF without ataxia  Station/Gait:  deferred    Stroke Scales      NIHSS  Interval and Comments Interval: baseline (05/15/19 1418)   1a. Level of Consciousness 0-->Alert, keenly responsive   1b. LOC Questions 2-->Answers neither question correctly   1c. LOC Commands 0-->Performs both tasks correctly   2.   Best Gaze 0-->Normal   3.   Visual 0-->No visual loss   4.   Facial Palsy 0-->Normal symmetrical movements   5a. Motor Arm, Left 0-->No drift, limb holds 90 (or 45) degrees   for full 10 secs   5b. Motor Arm, Right 0-->No drift, limb holds 90 (or 45) degrees   for full 10 secs   6a. Motor Leg, Left 0-->No drift, leg holds 30 degree position   for full 5 secs   6b. Motor Leg, right 0-->No drift,  leg holds 30 degree position   for full 5 secs   7.   Limb Ataxia 0-->Absent   8.   Sensory 0-->Normal, no sensory loss   9.   Best Language 0-->No aphasia, normal   10. Dysarthria 0-->Normal   11. Extinction and Inattention  0-->No abnormality   Total 2 (05/15/19 1418)       Labs/Imaging  Labs and imaging were reviewed and used in developing plan;   pertinent results included.  Data   CBC  WBC (10e9/L)   Date Value   05/15/2019 8.0   09/19/2017 3.4 (L)   06/16/2017 4.3    RBC Count (10e12/L)   Date Value   05/15/2019 3.77 (L)   09/19/2017 4.04   06/16/2017 3.97    Hemoglobin (g/dL)   Date Value   05/15/2019 12.9   09/19/2017 13.5   06/16/2017 13.1      Hematocrit (%)   Date Value   05/15/2019 38.5   09/19/2017 39.7   06/16/2017 39.4    Platelet Count (10e9/L)   Date Value   05/15/2019 181   09/19/2017 184   06/16/2017 175         BMP  Sodium (mmol/L)   Date Value   05/15/2019 139   09/19/2017 139   06/16/2017 140    Potassium (mmol/L)   Date Value   05/15/2019 3.7   09/19/2017 4.2   06/16/2017 4.1    Chloride (mmol/L)   Date Value   05/15/2019 105   09/19/2017 103   06/16/2017 108      Carbon Dioxide (mmol/L)   Date Value   05/15/2019 27   09/19/2017 27   06/16/2017 25    Glucose (mg/dL)   Date Value   05/15/2019 167 (H)   09/19/2017 152 (H)   06/16/2017 100 (H)    Urea Nitrogen (mg/dL)   Date Value   05/15/2019 27   09/19/2017 15   06/16/2017 21      Creatinine (mg/dL)   Date Value   05/15/2019 0.68   09/19/2017 0.54   06/16/2017 0.61    Calcium (mg/dL)   Date Value   05/15/2019 8.9   09/19/2017 7.9 (L)   06/16/2017 8.5         INR Troponin I A1C   INR (no units)   Date Value   05/15/2019 0.94    Troponin I ES (ug/L)   Date Value   05/15/2019 <0.015   05/15/2019 <0.015    No results found for: A1C     Liver Panel  Protein Total (g/dL)   Date Value   09/19/2017 6.0 (L)   06/16/2017 5.9 (L)   12/11/2006 6.8    Albumin (g/dL)   Date Value   09/19/2017 3.1 (L)   06/16/2017 3.2 (L)   12/11/2006 4.0    Bilirubin Total  (mg/dL)   Date Value   09/19/2017 0.5   06/16/2017 0.4   12/11/2006 0.2      Alkaline Phosphatase (U/L)   Date Value   09/19/2017 52   06/16/2017 51   12/11/2006 76    AST (U/L)   Date Value   09/19/2017 22   06/16/2017 19   12/11/2006 44    ALT (U/L)   Date Value   09/19/2017 24   06/16/2017 23   12/11/2006 43      No results found for: BILIDIRECT       Lipid Profile  No results found for: CHOL No results found for: HDL No results   found for: LDL   No results found for: TRIG No results found for: CHOLHDLRATIO           Stroke Code Data  (for stroke code without tele)  Stroke code activated 05/15/19   1025   First stroke provider response 05/15/19   1028   Last known normal 05/13/19   1700   Time of discovery   (or onset of symptoms) 05/15/19       Head CT read by me 05/15/19   1041   Was stroke code de-escalated? Yes 05/15/19 1053  patient is outside emergent treatment time parameters                      Attestation:  I have reviewed today's vital signs, notes, medications, labs and imaging with Dr. Hayes.  Amount of time performed on this consult: 30 minutes.    Leydi Villatoro PA-C

## 2019-05-15 NOTE — ED PROVIDER NOTES
"  History     Chief Complaint:  Altered Mental Status    The history is provided by a relative. The history is limited by the condition of the patient.      Danni Alvarez is a right-handed 86 year old female who presents for evaluation of altered mental status.  Per patient's daughter, the patient was found on the floor of her apartment last night by the patient's other daughter and was acting much more confused from her baseline. The patient lives independently. Patient's daughter was able to get her up and walking around, gave her ibuprofen, water, and Gatorade; most recent dose of ibuprofen was 7:00 AM this morning. Around 9:30 AM the patient woke up from a nap and, per daughter, could hardly walk and was saying strange things, although somewhat less confused this morning than last night. She was saying things last night such as \"someone's trying to strangle me\" and not knowing the day of the week.    Daughter notes that the patient was able to go to a [employer] three days ago and ambulate at baseline. She was acting at her baseline at that time. Last known well time was two days ago at 5:00 PM when the patient was touring [facility]isted Living facilities with her daughter. On presentation, the patient does not remember the fall and endorses no pain outside of pain in her right shoulder. Patient is not oriented to month or her age. Daughter does note that the patient has been exhibiting an increase in memory issues in the last year. Daughter denies any history of stroke in the patient. Patient denies abdominal pain, chest pain, shortness of breath, or other complaint.     Allergies:  Morphine      Medications:    Xanax  Lipitor  Aricept  Levothyroxine  Cyanocobalamin    Past Medical History:    Shoulder impingement syndrome  Anxiety  Hyperlipidemia  Major depressive disorder  Hypothyroidism     Past Surgical History:    Cataract, left and right  Colectomy  Hernia repair  Repair ptosis bilateral  Tubal " "ligation    Family History:    Father - MI  Sister - brain aneurysm    Social History:  Presents via EMS    Tobacco use: Never smoker   Alcohol use: Yes (occasionally)   PCP: Willow Phelan MD    Marital Status:        Review of Systems   Unable to perform ROS: Mental status change     Physical Exam     Patient Vitals for the past 24 hrs:   BP Temp Temp src Pulse Heart Rate Resp SpO2 Height Weight   05/15/19 1528 151/72 99.3  F (37.4  C) Oral -- 89 16 94 % -- --   05/15/19 1419 148/78 99.1  F (37.3  C) Oral -- 93 20 94 % -- --   05/15/19 1141 148/83 -- -- 89 90 22 95 % -- --   05/15/19 1036 (!) 164/94 98.3  F (36.8  C) Temporal 105 105 16 96 % 1.626 m (5' 4\") 54.4 kg (120 lb)     Physical Exam  General: Alert, appears elderly and frail. Cooperative.     In mild distress  HEENT:  Head:  Atraumatic  Ears:  External ears are normal  Mouth/Throat:  Oropharynx is without erythema or exudate and mucous membranes are dry.   Eyes:   Conjunctivae normal and EOM are normal. No scleral icterus.    Pupils are equal, round, and reactive to light.   CV:  Tachycardic rate, regular rhythm, normal heart sounds and radial pulses are 2+ and symmetric.  No murmur.  Resp:  Breath sounds are clear bilaterally    Non-labored, no retractions or accessory muscle use  GI:  Abdomen is soft, no distension, no tenderness. No rebound or guarding.  No CVA tenderness bilaterally  MS:  Right shoulder with tenderness and bruising.  Crepitus detected overlying right clavicle.  Right scapula tender to palpation. No edema.    No midline cervical, thoracic, or lumbar tenderness  Skin:  Warm and dry.  Bruises to right shoulder and chest wall  Neuro: Alert. Globally weak.  Sensation intact in all 4 extremities. GCS: 15    Cranial nerves 2-12 intact.  Psych:  Normal mood and affect.      Emergency Department Course   ECG (10:56:17):  Rate 94 bpm. ME interval 142. QRS duration 74. QT/QTc 360/450. P-R-T axes 35 -19 78. Normal sinus rhythm. Possible " left atrial enlargement. Inferior infarct, age undetermined. Abnormal ECG. Agree with computer interpretation. No significant change when compared to EKG dated 09/19/2017.  Interpreted at 1100 by Ko Samuel MD.     Imaging:  Radiographic findings were communicated with the patient and family who voiced understanding of the findings.    CT Head without contrast:  IMPRESSION:   1. No acute abnormality.  2. Atrophy of the brain.  White matter changes consistent with sequelae of small vessel ischemic disease.     CT Head Neck Angio without & with contrast:  IMPRESSION: Mild calcified plaque in the left carotid bifurcation and left vertebral artery V4 segment. No evidence of arterial occlusion or significant stenosis.    I called the report to Dr. Ko Samuel in the emergency room on 5/15/2019 at 11:02 AM.    Shoulder XR, 3 views, right:  IMPRESSION:   1. A linear lucency within the inferior scapular body with associated small step-off, consistent with an acute fracture. The entire extent of the fracture is not well-visualized on this radiograph.  2. Mildly displaced acute fractures of the lateral aspects of the right second through seventh ribs.  3. No other visualized acute fractures of the right shoulder.  4. No dislocation of the right glenohumeral joint.    XR Pelvis, 1-2 views:  IMPRESSION:   1. No visualized acute fracture or malalignment of the pelvis.  2. Mild degenerative changes in bilateral hip joints.    XR Chest, 2 views:  IMPRESSION:   1. Acute fracture of the lateral aspects of a few upper right ribs, resulting in mild concavity of the chest wall. No visualized pneumothorax.  2. No evidence of active cardiopulmonary disease.    Imaging independently reviewed and agree with radiologist interpretation.     Laboratory:  CBC: AWNL (WBC 8.0, HGB 12.9, )   BMP:  (H) o/w WNL (Creatinine 0.68)   INR: 0.94  CK total: 2379 (HH)  PTT: 22  1039: Glucose by meter: 167 (H)    1103: Troponin: <0.015      1318: Troponin: <0.015       UA with micro: Ketones 10, moderate blood, Albumin 20, Leukocyte Esterace small, WBC 6 (H), Bacteria few, Mucous present o/w negative     Interventions:  1138: NS 1L IV Bolus    Emergency Department Course:  Code stroke activated in triage.    Past medical records, nursing notes, and vitals reviewed.  1024: Arrival in ST03. I performed an exam of the patient and obtained history, as documented above.      1035: I spoke with stroke neurology, Dr. Garcia, directly when he came to evaluate the patient in the emergency department.    IV inserted and blood drawn. Above interventions provided. Blood was sent to the lab for further testing, results above.   The patient was sent for multiple CT, x-rays while in the emergency department, findings above.    1102: I discussed the patient with radiology regarding her normal CT head scan.    1129: I rechecked the patient. Explained findings to the patient and daughter.    Findings and plan explained to the daughter who consents to admission.     1201: Discussed the patient with Dr. Gordon, who will admit the patient to a medical bed for further monitoring, evaluation, and treatment.    1300: I discussed the patient with Dr. Mcbride of trauma surgery who agreed to consult.     Impression & Plan      CMS Diagnoses: The patient has stroke symptoms:           ED Stroke specific documentation           NIHSS PDF          Protocol PDF     Patient last known well time: 5:00 PM on 5/13/2019  ED Provider first to bedside at: 10:24 AM  CT Results received at: 11:02 AM    tPA:   Not given due to outside the time window.    If treating with tPA: Ensure SBP<185 and DBP<105 prior to treatment with IV tPA.  Administering IV tPA after treatment with IV labetalol, hydralazine, or nicardipine is reasonable once BP control is established.    Endovascular Retrieval:  Not initiated due to absence of proximal vessel occlusion    National Institutes of Health Stroke Scale  (Baseline)  Time Performed: 10:28 AM      Score    Level of consciousness: (0)   Alert, keenly responsive    LOC questions: (0)   Answers both questions correctly    LOC commands: (0)   Performs both tasks correctly    Best gaze: (0)   Normal    Visual: (0)   No visual loss    Facial palsy: (0)   Normal symmetrical movements    Motor arm (left): (0)   No drift    Motor arm (right): (0)   No drift    Motor leg (left): (0)   No drift    Motor leg (right): (0)   No drift    Limb ataxia: (0)   Absent    Sensory: (0)   Normal- no sensory loss    Best language: (0)   Normal- no aphasia    Dysarthria: (0)   Normal    Extinction and inattention: (0)   No abnormality        Total Score:  0      Stroke Mimics were considered (including migraine headache, seizure disorder, hypoglycemia (or hyperglycemia), head or spinal trauma, CNS infection, Toxin ingestion and shock state (e.g. sepsis) .       Medical Decision Making:  Danni Alvarez is a 86 year old female who presents with altered mental status after an unwitnessed fall yesterday.  Patient was found on the floor by daughter yesterday after a unwitnessed fall.  She has a significant bruise and hematoma to the right shoulder.  Due to patient's initial confusion and unclear last well-known time a stroke code was activated.  Patient's initial glucose within normal limits.  Patient initially sent for CT imaging reassuringly showing no acute intracranial abnormalities on the CT scan of the head without contrast.  CT angiography of the head neck ensued and ultimately showed a mild calcified plaque in the left carotid bifurcation and left vertebral artery V4 segment.  There is no evidence of arterial occlusion or significant stenosis.  She is not a candidate for TPA nor endovascular therapy based on these results.  Patient does appear to be at a baseline mental status on my reevaluation as well.  Stroke neurology ultimately recommended nonemergent MRI imaging of the brain upon  admission.      Patient does have an elevated CK likely secondary to being on the ground for an undetermined period of time yesterday after an unwitnessed fall.  She does not have signs of renal damage at this time but certainly concern for rhabdomyolysis.  Patient also had x-ray imaging concerning for multiple rib fractures along the right chest wall as well as a potential fracture of the right scapula.  CT chest imaging was obtained showing right clavicle fracture, right scapular fracture and multiple rib fractures of the right chest wall.  I did discuss briefly with Dr. Mcbride who agrees to consult on the case due to multiple rib fractures, clavicle fracture and right scapula fracture..  Patient is safe to manage here at [facility] and family and patient both agreed to admission at this time.  I spoke with Dr. Gordon of the hospitalist service who agreed to inpatient admission at this time.    Diagnosis:    ICD-10-CM    1. Fall, initial encounter W19.XXXA UA with Microscopic     Troponin I (now)     TSH with free T4 reflex     TSH with free T4 reflex     CANCELED: TSH with free T4 reflex   2. Confusion R41.0    3. Acute pain of right shoulder M25.511    4. Chest wall contusion, right, initial encounter S20.211A    5. Closed fracture of multiple ribs of right side, initial encounter S22.41XA    6. Closed fracture of right scapula, unspecified part of scapula, initial encounter S42.101A    7. Closed nondisplaced fracture of right clavicle, unspecified part of clavicle, initial encounter S42.001A    8. Traumatic rhabdomyolysis, initial encounter (H) T79.6XXA          Disposition:  Admitted to hospitalist service.    Scribe Disclosure:  RAMON, Ben Granado, am serving as a scribe at 1:27 PM on 5/15/2019 to document services personally performed by Ko Samuel MD based on my observations and the provider's statements to me.   5/15/2019    EMERGENCY DEPARTMENT     Ko Samuel MD  05/15/19 1282

## 2019-05-15 NOTE — PHARMACY-ADMISSION MEDICATION HISTORY
Admission medication history interview status for the 5/15/2019  admission is complete. See EPIC admission navigator for prior to admission medications     Medication history source reliability:Good    Actions taken by pharmacist (provider contacted, etc):None     Additional medication history information not noted on PTA med list :None    Medication reconciliation/reorder completed by provider prior to medication history? No    Time spent in this activity: 15 minutes    Prior to Admission medications    Medication Sig Last Dose Taking? Auth Provider   ALPRAZolam (XANAX PO) Take 0.25 mg by mouth nightly as needed  5/13/2019 Yes Reported, Patient   atorvastatin (LIPITOR) 10 MG tablet Take 10 mg by mouth daily 5/14/2019 at Unknown time Yes Unknown, Entered By History   Donepezil HCl (ARICEPT PO) Take 10 mg by mouth At Bedtime 5/13/2019 Yes Reported, Patient   Levothyroxine Sodium (LEVOTHROID PO) Take 50 mcg by mouth daily  5/14/2019 at Unknown time Yes Reported, Patient   vitamin B-12 (CYANOCOBALAMIN) 1000 MCG tablet Take 2,000 mcg by mouth daily  5/14/2019 at Unknown time Yes Unknown, Entered By History

## 2019-05-15 NOTE — ED NOTES
"[facility]  ED Nurse Handoff Report    ED Chief complaint: Altered Mental Status (confusion after an unwittnessed fall yesterday. Found on floor yesterday by one of her daughters. Awoke easily. Now family still reporting confusion and some right shoulder pain.)      ED Diagnosis:   Final diagnoses:   Fall, initial encounter   Confusion   Acute pain of right shoulder   Chest wall contusion, right, initial encounter   Closed fracture of multiple ribs of right side, initial encounter       Code Status: Full Code    Allergies:   Allergies   Allergen Reactions     Morphine        Activity level - Baseline/Home:  Independent    Activity Level - Current:   Independent     Needed?: No    Isolation: No  Infection: Not Applicable  Bariatric?: No    Vital Signs:   Vitals:    05/15/19 1036   BP: (!) 164/94   Pulse: 105   Resp: 16   Temp: 98.3  F (36.8  C)   TempSrc: Temporal   SpO2: 96%   Weight: 54.4 kg (120 lb)   Height: 1.626 m (5' 4\")       Cardiac Rhythm: ,        Pain level: 0-10 Pain Scale: 5    Is this patient confused?: Yes< baseline   Does this patient have a guardian?  No         If yes, is there guardianship documents in the Epic \"Code/ACP\" activity?  N/A         Guardian Notified?  N/A  [place] - Suicide Severity Rating Scale Completed?  Yes  If yes, what color did the patient score?  White    Patient Report: Initial Complaint: Pt presents with daughter for complaints of confusion secondary to fall. Pt may have laid on floor for up to two days, last known well at 1700 monday. Pt has bruising to right shoulder.   Focused Assessment: Bruising to right shoulder, confusion.   Tests Performed: Labs, CT, Xrays.  Abnormal Results: Results for TASHIA LEON (MRN [id number]) as of 5/15/2019 12:28   Ref. Range 5/15/2019 10:31   Sodium Latest Ref Range: 133 - 144 mmol/L 139   Potassium Latest Ref Range: 3.4 - 5.3 mmol/L 3.7   Chloride Latest Ref Range: 94 - 109 mmol/L 105   Carbon Dioxide " Latest Ref Range: 20 - 32 mmol/L 27   Urea Nitrogen Latest Ref Range: 7 - 30 mg/dL 27   Creatinine Latest Ref Range: 0.52 - 1.04 mg/dL 0.68   GFR Estimate Latest Ref Range: >60 mL/min/1.73_m2 79   GFR Estimate If Black Latest Ref Range: >60 mL/min/1.73_m2 >90   Calcium Latest Ref Range: 8.5 - 10.1 mg/dL 8.9   Anion Gap Latest Ref Range: 3 - 14 mmol/L 7   CK Total Latest Ref Range: 30 - 225 U/L 2,379 (HH)   Troponin I ES Latest Ref Range: 0.000 - 0.045 ug/L <0.015   Glucose Latest Ref Range: 70 - 99 mg/dL 167 (H)   WBC Latest Ref Range: 4.0 - 11.0 10e9/L 8.0   Hemoglobin Latest Ref Range: 11.7 - 15.7 g/dL 12.9   Hematocrit Latest Ref Range: 35.0 - 47.0 % 38.5   Platelet Count Latest Ref Range: 150 - 450 10e9/L 181   RBC Count Latest Ref Range: 3.8 - 5.2 10e12/L 3.77 (L)   MCV Latest Ref Range: 78 - 100 fl 102 (H)   MCH Latest Ref Range: 26.5 - 33.0 pg 34.2 (H)   MCHC Latest Ref Range: 31.5 - 36.5 g/dL 33.5   RDW Latest Ref Range: 10.0 - 15.0 % 15.0   Diff Method Unknown Automated Method   % Neutrophils Latest Units: % 74.5   % Lymphocytes Latest Units: % 16.8   % Monocytes Latest Units: % 8.2   % Eosinophils Latest Units: % 0.0   % Basophils Latest Units: % 0.1   % Immature Granulocytes Latest Units: % 0.4   Nucleated RBCs Latest Ref Range: 0 /100 0   Absolute Neutrophil Latest Ref Range: 1.6 - 8.3 10e9/L 5.9   Absolute Lymphocytes Latest Ref Range: 0.8 - 5.3 10e9/L 1.3   Absolute Monocytes Latest Ref Range: 0.0 - 1.3 10e9/L 0.7   Absolute Eosinophils Latest Ref Range: 0.0 - 0.7 10e9/L 0.0   Absolute Basophils Latest Ref Range: 0.0 - 0.2 10e9/L 0.0   Abs Immature Granulocytes Latest Ref Range: 0 - 0.4 10e9/L 0.0   Absolute Nucleated RBC Unknown 0.0   INR Latest Ref Range: 0.86 - 1.14  0.94   PTT Latest Ref Range: 22 - 37 sec 22   IMPRESSION:   1. A linear lucency within the inferior scapular body with associated  small step-off, consistent with an acute fracture. The entire extent  of the fracture is not  well-visualized on this radiograph.  2. Mildly displaced acute fractures of the lateral aspects of the  right second through seventh ribs.  3. No other visualized acute fractures of the right shoulder.  4. No dislocation of the right glenohumeral joint.  CT SCAN OF THE HEAD WITHOUT CONTRAST   5/15/2019 10:48 AM      HISTORY: Code stroke. Altered mental status, found on the floor last  night, increased confusion, difficulty walking, speech difficulty.     TECHNIQUE:  Axial images of the head and coronal reformations without  IV contrast material. Radiation dose for this scan was reduced using  automated exposure control, adjustment of the mA and/or kV according  to patient size, or iterative reconstruction technique.     COMPARISON: None.     FINDINGS:  There is generalized atrophy of the brain.  There is low  attenuation in the white matter of the cerebral hemispheres consistent  with sequelae of small vessel ischemic disease. There is no evidence  of intracranial hemorrhage, mass, acute infarct or anomaly.      The visualized portions of the sinuses and mastoids appear normal.  There is no evidence of trauma.                                                                       IMPRESSION:   1. No acute abnormality.  2. Atrophy of the brain.  White matter changes consistent with  sequelae of small vessel ischemic disease.      Treatments provided: 1 L NS    Family Comments: At bedside    OBS brochure/video discussed/provided to patient/family: N/A              Name of person given brochure if not patient: NA              Relationship to patient: NA    ED Medications:   Medications   iopamidol (ISOVUE-370) solution 120 mL (70 mLs Intravenous Given 5/15/19 1045)   100mL Saline (100 mLs Intravenous Given 5/15/19 1046)   0.9% sodium chloride BOLUS (1,000 mLs Intravenous New Bag 5/15/19 1138)       Drips infusing?:  No    For the majority of the shift this patient was Green.   Interventions performed were NA.    Severe  Sepsis OR Septic Shock Diagnosis Present: No    To be done/followed up on inpatient unit:  NA    ED NURSE PHONE NUMBER: [id number]

## 2019-05-15 NOTE — PROGRESS NOTES
RECEIVING UNIT ED HANDOFF REVIEW    ED Nurse Handoff Report was reviewed by: Bill Ward on May 15, 2019 at 12:50 PM

## 2019-05-15 NOTE — PLAN OF CARE
Up from ED this afternoon. Alert to self and time, fluctuating orientation to place and situation. VSS ex low grade temp of 99.3. Given PRN tylenol for right shoulder discomfort. Up assist 1/GB/walker. Orders for NWB to RUE - Using sling with all OOB mobility. Urinating in BSC. Regular diet. Bruising to right shoulder and flank. Scattered scabs and bruises on LE. Reddened coccyx. Ortho following. PT/OT and SW consulted. Will continue to monitor.

## 2019-05-15 NOTE — ED TRIAGE NOTES
Fell yesterday and laid on floor for around 7 hours.  Confused as to what happened.  Shoulder pain and unable to walk well.  Weakness on one side.

## 2019-05-15 NOTE — CONSULTS
General Surgery Trauma Consultation/H&P    Danni Alvarez MRN#: [id number]   Age: 86 year old YOB: 1933     Date of Admission:          5/15/2019  Reason for consult/H&P: Fall with chest injury   Surgeon:      Cedric Mcbride MD                  Chief Complaint:   -fall with altered mental status.         History of Present Illness:   This patient is a 86 year old  female who presented to the [facility] ER with an unwitnessed fall at home.  She was found down and confused by her daughter.  This morning the patient was confused further and could barely walk.  She was brought to the emergency room..  She is currently complaining of right-sided back and his right shoulder pain.  She says her abdomen and head do not hurt.  She said her legs do not hurt.  Denies fever, chills, nausea, vomiting, change in BM or urination.   Denies having any previous episodes or abdominal surgery. History is obtained from the patient and chart.         Past Medical History:    has a past medical history of Abnormal feces, Abnormal glucose, Anxiety state, unspecified, Hyperlipidemia, Major depressive disorder, single episode, mild (H), Malignant neoplasm of colon, unspecified site, Mild cognitive impairment, so stated, and Unspecified hypothyroidism.          Past Surgical History:     Past Surgical History:   Procedure Laterality Date     CATARACT IOL, RT/LT      right and left     COLECTOMY       HERNIA REPAIR       REPAIR PTOSIS BILATERAL Bilateral 1/21/2016    Procedure: REPAIR PTOSIS BILATERAL;  Surgeon: Jose Stevens MD;  Location: [facility]     TUBAL LIGATION              Medications:     Prior to Admission medications    Medication Sig Start Date End Date Taking? Authorizing Provider   ALPRAZolam (XANAX PO) Take 0.25 mg by mouth nightly as needed    Yes Reported, Patient   atorvastatin (LIPITOR) 10 MG tablet Take 10 mg by mouth daily   Yes Unknown, Entered By History   Donepezil HCl (ARICEPT PO) Take 10  "mg by mouth At Bedtime   Yes Reported, Patient   Levothyroxine Sodium (LEVOTHROID PO) Take 50 mcg by mouth daily    Yes Reported, Patient   vitamin B-12 (CYANOCOBALAMIN) 1000 MCG tablet Take 2,000 mcg by mouth daily    Yes Unknown, Entered By History            Allergies:     Allergies   Allergen Reactions     Morphine             Social History:     Social History     Tobacco Use     Smoking status: Never Smoker     Smokeless tobacco: Never Used   Substance Use Topics     Alcohol use: Yes     Comment: occasionally             Family History:    This patient has no significant relevant family history.  Family history is reviewed in detail.          Review of Systems:   Complete ROS is negative other than noted in the HPI.  C: NEGATIVE for fever, chills, change in weight  R: NEGATIVE for significant cough or SOB  CV: NEGATIVE for  palpitations or peripheral edema  GI:  NEGATIVE for dysuria, heartburn, or change in bowel habits  H: NEGATIVE for bleeding problems         Physical Exam:   Blood pressure 151/72, pulse 89, temperature 99.3  F (37.4  C), temperature source Oral, resp. rate 16, height 1.626 m (5' 4\"), weight 54.4 kg (120 lb), SpO2 94 %.  No intake/output data recorded.    General - This is a well developed, well nourished female .  Skull- no clinical skull or facial bone fractures  HEENT - Normocephalic. Atraumatic. Moist mucous membranes. Pupils equal.  No scleral icterus. Nose normal.  Neck - Supple without masses. No cervical adenopathy or thyromegaly  Lungs - Breathing not labored  Clavicles: right tender, left normal  Chest -tender right clavicle, posterior right chest. CVA's nontender. Much bruising on right shoulder and chest  Heart - Regular rate & rhythm   Abdomen - Soft, nontender, nondistended with +bowel sounds, no organomegaly.  Extremities - Moves all extremities. Warm without edema. Pulses decreased. Bruises noted, but appear chronic. Some old scabs.   Neurologic - Nonfocal. Alert but not " well oriented          Data:   Labs:  Recent Labs   Lab Test 05/15/19  1031 09/19/17  0635 06/16/17  1215   WBC 8.0 3.4* 4.3   HGB 12.9 13.5 13.1   HCT 38.5 39.7 39.4    184 175     Recent Labs   Lab Test 05/15/19  1031 09/19/17  0635 06/16/17  1215   POTASSIUM 3.7 4.2 4.1   CHLORIDE 105 103 108   CO2 27 27 25   BUN 27 15 21   CR 0.68 0.54 0.61     Recent Labs   Lab Test 09/19/17  0635 06/16/17  1215   BILITOTAL 0.5 0.4   ALT 24 23   AST 22 19   ALKPHOS 52 51     Recent Labs   Lab Test 05/15/19  1031   INR 0.94   PTT 22     Recent Labs   Lab Test 05/15/19  1031 09/19/17  0635 06/16/17  1215   ELY 8.9 7.9* 8.5     Recent Labs   Lab Test 05/15/19  1352 05/15/19  1031 09/19/17  0732 09/19/17  0635 06/16/17  1225 06/16/17  1215   ANIONGAP  --  7  --  9  --  7   PROTEIN 30*  --  10*  --  10*  --    ALBUMIN  --   --   --  3.1*  --  3.2*   Images reviewed    CT scan of the head: no acute change  CT Neck angio:  No acute stenosis or occlusion  Shoulder: scapular fx, right  Pelvis: no fx  CXR: right rib fx. No PTX  CT chest:   fx right 2 - 7 ribs. Right 11th rib. No real PTX.   Clavicle: right clavicle fx    Ultrasound of the abdomen: not done               Assessment:   Many rib fractures, no PTX. Change in mental status.         Plan:    Work on pulmonary toilet, walk as tolerated. Pain control. Further eval per stroke team       I have discussed the history, physical, and plan with the patient.   Cedric Mcbride M.D.

## 2019-05-15 NOTE — CONSULTS
[facility]      Stroke Consult Note    Reason for Consult:  Stroke Code    HPI  Danni Alvarez is a 86 year old female found to have altered mental status. She was last normal 5/12 and 5/13. She was found down and more confused yesterday evening. Daughter was able to get her up to ambulate. This morning patient woke up and could hardly walk and was confused, saying strange things. She was noted to have right arm weakness and thus stroke code was called.  She does have bruising around the right shoulder.    TPA Treatment   Not given due to outside the time window.    Endovascular Treatment  Not initiated due to absence of proximal vessel occlusion    Impression  # Confusion, question of acute stroke vs metabolic/other encephalopathy.     # Fall, elevated CK, multiple fractures    Recommendations  - MRI brain to assess for stroke  - q4hr neuro checks  - normal BP goal is okay  - given multiple fractures, will hold off on aspirin until MRI resulted, and if there is an acute stroke then we would recommend daily aspirin.    Patient Follow-up    - final recommendation pending work-up      Please contact the Stroke Service with any questions. Seen and discussed with Dr. Garcia.    Agueda Mahmood MD  Stroke fellow  __________________________________________________    Past Medical History:   Diagnosis Date     Abnormal feces      Abnormal glucose      Anxiety state, unspecified      Hyperlipidemia      Major depressive disorder, single episode, mild (H)      Malignant neoplasm of colon, unspecified site      Mild cognitive impairment, so stated      Unspecified hypothyroidism        Past Surgical History:   Procedure Laterality Date     CATARACT IOL, RT/LT      right and left     COLECTOMY       HERNIA REPAIR       REPAIR PTOSIS BILATERAL Bilateral 1/21/2016    Procedure: REPAIR PTOSIS BILATERAL;  Surgeon: Jose Stevens MD;  Location: [facility]     TUBAL LIGATION         Medications   Current  Facility-Administered Medications   Medication     acetaminophen (TYLENOL) Suppository 650 mg     acetaminophen (TYLENOL) tablet 650 mg     atorvastatin (LIPITOR) tablet 10 mg     donepezil (ARICEPT) tablet 10 mg     lactated ringers infusion     levothyroxine (SYNTHROID/LEVOTHROID) tablet 50 mcg     melatonin tablet 1 mg     naloxone (NARCAN) injection 0.1-0.4 mg     ondansetron (ZOFRAN-ODT) ODT tab 4 mg    Or     ondansetron (ZOFRAN) injection 4 mg     oxyCODONE (ROXICODONE) tablet 5 mg     senna-docusate (SENOKOT-S/PERICOLACE) 8.6-50 MG per tablet 1 tablet    Or     senna-docusate (SENOKOT-S/PERICOLACE) 8.6-50 MG per tablet 2 tablet           Allergies   Allergies   Allergen Reactions     Morphine        Family History   non contributory    Social History   Social History     Socioeconomic History     Marital status:      Spouse name: Not on file     Number of children: Not on file     Years of education: Not on file     Highest education level: Not on file   Occupational History     Not on file   Social Needs     Financial resource strain: Not on file     Food insecurity:     Worry: Not on file     Inability: Not on file     Transportation needs:     Medical: Not on file     Non-medical: Not on file   Tobacco Use     Smoking status: Never Smoker     Smokeless tobacco: Never Used   Substance and Sexual Activity     Alcohol use: Yes     Comment: occasionally     Drug use: No     Sexual activity: Not on file   Lifestyle     Physical activity:     Days per week: Not on file     Minutes per session: Not on file     Stress: Not on file   Relationships     Social connections:     Talks on phone: Not on file     Gets together: Not on file     Attends [employer] service: Not on file     Active member of club or organization: Not on file     Attends meetings of clubs or organizations: Not on file     Relationship status: Not on file     Intimate partner violence:     Fear of current or ex partner: Not on file      Emotionally abused: Not on file     Physically abused: Not on file     Forced sexual activity: Not on file   Other Topics Concern     Not on file   Social History Narrative     Not on file          ROS  Review of systems not obtained due to patient factors - confusion    PHYSICAL EXAMINATION  B/P:     148/83 (05/15/19 1141)    Heart Rate: 90 (05/15/19 1141)    Pulse: 89 (05/15/19 1141)   Resp:  22 (05/15/19 1141)  Temp: 98.3  F (36.8  C)   Temporal (05/15/19 1036)    General:  patient lying in bed without any acute distress    HEENT:  normocephalic/atraumatic  Cardio:  Rate on EKG is NSR  Pulmonary:  no respiratory distress  Abdomen:  non-distended  Extremities:  Bruising right shoulder  Skin:  bruising present     Neurologic  Mental Status:  Awake, alert, not oriented, naming intact  Cranial Nerves:  visual fields intact, EOMI with normal smooth pursuit, facial sensation intact and symmetric, facial movements symmetric, palate elevation symmetric and uvula midline, no dysarthria, tongue protrusion midline  Motor:  normal muscle tone and bulk, no abnormal movements, restricted movement of the RUE. left side without drift, b/l LE equal strength. Right  strength is normal, unable to fully raise but can hold the arm up for 10 seconds.   Reflexes:  defer  Sensory:  Intact to LT  Coordination:  FNF without ataxia  Station/Gait:  deferred    Stroke Scales      NIHSS  Interval and Comments Interval: baseline (05/15/19 1418)   1a. Level of Consciousness 0-->Alert, keenly responsive   1b. LOC Questions 2-->Answers neither question correctly   1c. LOC Commands 0-->Performs both tasks correctly   2.   Best Gaze 0-->Normal   3.   Visual 0-->No visual loss   4.   Facial Palsy 0-->Normal symmetrical movements   5a. Motor Arm, Left 0-->No drift, limb holds 90 (or 45) degrees for full 10 secs   5b. Motor Arm, Right 0-->No drift, limb holds 90 (or 45) degrees for full 10 secs   6a. Motor Leg, Left 0-->No drift, leg holds 30  degree position for full 5 secs   6b. Motor Leg, right 0-->No drift, leg holds 30 degree position for full 5 secs   7.   Limb Ataxia 0-->Absent   8.   Sensory 0-->Normal, no sensory loss   9.   Best Language 0-->No aphasia, normal   10. Dysarthria 0-->Normal   11. Extinction and Inattention  0-->No abnormality   Total 2 (05/15/19 1418)       Labs/Imaging  Labs and imaging were reviewed and used in developing plan; pertinent results included.  Data   CBC  WBC (10e9/L)   Date Value   05/15/2019 8.0   09/19/2017 3.4 (L)   06/16/2017 4.3    RBC Count (10e12/L)   Date Value   05/15/2019 3.77 (L)   09/19/2017 4.04   06/16/2017 3.97    Hemoglobin (g/dL)   Date Value   05/15/2019 12.9   09/19/2017 13.5   06/16/2017 13.1      Hematocrit (%)   Date Value   05/15/2019 38.5   09/19/2017 39.7   06/16/2017 39.4    Platelet Count (10e9/L)   Date Value   05/15/2019 181   09/19/2017 184   06/16/2017 175         BMP  Sodium (mmol/L)   Date Value   05/15/2019 139   09/19/2017 139   06/16/2017 140    Potassium (mmol/L)   Date Value   05/15/2019 3.7   09/19/2017 4.2   06/16/2017 4.1    Chloride (mmol/L)   Date Value   05/15/2019 105   09/19/2017 103   06/16/2017 108      Carbon Dioxide (mmol/L)   Date Value   05/15/2019 27   09/19/2017 27   06/16/2017 25    Glucose (mg/dL)   Date Value   05/15/2019 167 (H)   09/19/2017 152 (H)   06/16/2017 100 (H)    Urea Nitrogen (mg/dL)   Date Value   05/15/2019 27   09/19/2017 15   06/16/2017 21      Creatinine (mg/dL)   Date Value   05/15/2019 0.68   09/19/2017 0.54   06/16/2017 0.61    Calcium (mg/dL)   Date Value   05/15/2019 8.9   09/19/2017 7.9 (L)   06/16/2017 8.5         INR Troponin I A1C   INR (no units)   Date Value   05/15/2019 0.94    Troponin I ES (ug/L)   Date Value   05/15/2019 <0.015   05/15/2019 <0.015    No results found for: A1C     Liver Panel  Protein Total (g/dL)   Date Value   09/19/2017 6.0 (L)   06/16/2017 5.9 (L)   12/11/2006 6.8    Albumin (g/dL)   Date Value   09/19/2017  3.1 (L)   06/16/2017 3.2 (L)   12/11/2006 4.0    Bilirubin Total (mg/dL)   Date Value   09/19/2017 0.5   06/16/2017 0.4   12/11/2006 0.2      Alkaline Phosphatase (U/L)   Date Value   09/19/2017 52   06/16/2017 51   12/11/2006 76    AST (U/L)   Date Value   09/19/2017 22   06/16/2017 19   12/11/2006 44    ALT (U/L)   Date Value   09/19/2017 24   06/16/2017 23   12/11/2006 43      No results found for: BILIDIRECT       Lipid Profile  No results found for: CHOL No results found for: HDL No results found for: LDL   No results found for: TRIG No results found for: CHOLHDLRATIO           Stroke Code Data  (for stroke code without tele)  Stroke code activated 05/15/19   1025   First stroke provider response 05/15/19   1028   Last known normal 05/13/19   1700   Time of discovery   (or onset of symptoms) 05/15/19       Head CT read by me 05/15/19   1041   Was stroke code de-escalated? Yes 05/15/19 1053  patient is outside emergent treatment time parameters

## 2019-05-16 ENCOUNTER — APPOINTMENT (OUTPATIENT)
Dept: PHYSICAL THERAPY | Facility: CLINIC | Age: 84
DRG: 564 | End: 2019-05-16
Attending: INTERNAL MEDICINE
Payer: COMMERCIAL

## 2019-05-16 ENCOUNTER — APPOINTMENT (OUTPATIENT)
Dept: OCCUPATIONAL THERAPY | Facility: CLINIC | Age: 84
DRG: 564 | End: 2019-05-16
Attending: INTERNAL MEDICINE
Payer: COMMERCIAL

## 2019-05-16 ENCOUNTER — APPOINTMENT (OUTPATIENT)
Dept: GENERAL RADIOLOGY | Facility: CLINIC | Age: 84
DRG: 564 | End: 2019-05-16
Attending: SURGERY
Payer: COMMERCIAL

## 2019-05-16 LAB
ANION GAP SERPL CALCULATED.3IONS-SCNC: 5 MMOL/L (ref 3–14)
BUN SERPL-MCNC: 21 MG/DL (ref 7–30)
CALCIUM SERPL-MCNC: 8.1 MG/DL (ref 8.5–10.1)
CHLORIDE SERPL-SCNC: 111 MMOL/L (ref 94–109)
CK SERPL-CCNC: 859 U/L (ref 30–225)
CO2 SERPL-SCNC: 28 MMOL/L (ref 20–32)
CREAT SERPL-MCNC: 0.49 MG/DL (ref 0.52–1.04)
GFR SERPL CREATININE-BSD FRML MDRD: 88 ML/MIN/{1.73_M2}
GLUCOSE SERPL-MCNC: 128 MG/DL (ref 70–99)
POTASSIUM SERPL-SCNC: 3.2 MMOL/L (ref 3.4–5.3)
POTASSIUM SERPL-SCNC: 4 MMOL/L (ref 3.4–5.3)
SODIUM SERPL-SCNC: 144 MMOL/L (ref 133–144)

## 2019-05-16 PROCEDURE — 97116 GAIT TRAINING THERAPY: CPT | Mod: GP | Performed by: PHYSICAL THERAPIST

## 2019-05-16 PROCEDURE — 97535 SELF CARE MNGMENT TRAINING: CPT | Mod: GO | Performed by: OCCUPATIONAL THERAPIST

## 2019-05-16 PROCEDURE — 97530 THERAPEUTIC ACTIVITIES: CPT | Mod: GP | Performed by: PHYSICAL THERAPIST

## 2019-05-16 PROCEDURE — 80048 BASIC METABOLIC PNL TOTAL CA: CPT | Performed by: INTERNAL MEDICINE

## 2019-05-16 PROCEDURE — 99207 ZZC CDG-MDM COMPONENT: MEETS LOW - DOWN CODED: CPT | Performed by: STUDENT IN AN ORGANIZED HEALTH CARE EDUCATION/TRAINING PROGRAM

## 2019-05-16 PROCEDURE — 82550 ASSAY OF CK (CPK): CPT | Performed by: INTERNAL MEDICINE

## 2019-05-16 PROCEDURE — 36415 COLL VENOUS BLD VENIPUNCTURE: CPT | Performed by: STUDENT IN AN ORGANIZED HEALTH CARE EDUCATION/TRAINING PROGRAM

## 2019-05-16 PROCEDURE — 99232 SBSQ HOSP IP/OBS MODERATE 35: CPT | Performed by: STUDENT IN AN ORGANIZED HEALTH CARE EDUCATION/TRAINING PROGRAM

## 2019-05-16 PROCEDURE — 97161 PT EVAL LOW COMPLEX 20 MIN: CPT | Mod: GP | Performed by: PHYSICAL THERAPIST

## 2019-05-16 PROCEDURE — 84132 ASSAY OF SERUM POTASSIUM: CPT | Performed by: STUDENT IN AN ORGANIZED HEALTH CARE EDUCATION/TRAINING PROGRAM

## 2019-05-16 PROCEDURE — 99231 SBSQ HOSP IP/OBS SF/LOW 25: CPT | Performed by: SURGERY

## 2019-05-16 PROCEDURE — 12000000 ZZH R&B MED SURG/OB

## 2019-05-16 PROCEDURE — 25800030 ZZH RX IP 258 OP 636: Performed by: INTERNAL MEDICINE

## 2019-05-16 PROCEDURE — 71046 X-RAY EXAM CHEST 2 VIEWS: CPT

## 2019-05-16 PROCEDURE — 97166 OT EVAL MOD COMPLEX 45 MIN: CPT | Mod: GO | Performed by: OCCUPATIONAL THERAPIST

## 2019-05-16 PROCEDURE — 36415 COLL VENOUS BLD VENIPUNCTURE: CPT | Performed by: INTERNAL MEDICINE

## 2019-05-16 PROCEDURE — 25000132 ZZH RX MED GY IP 250 OP 250 PS 637: Performed by: STUDENT IN AN ORGANIZED HEALTH CARE EDUCATION/TRAINING PROGRAM

## 2019-05-16 PROCEDURE — 25000132 ZZH RX MED GY IP 250 OP 250 PS 637: Performed by: INTERNAL MEDICINE

## 2019-05-16 RX ORDER — POTASSIUM CHLORIDE 29.8 MG/ML
20 INJECTION INTRAVENOUS
Status: DISCONTINUED | OUTPATIENT
Start: 2019-05-16 | End: 2019-05-18 | Stop reason: HOSPADM

## 2019-05-16 RX ORDER — POTASSIUM CHLORIDE 29.8 MG/ML
20 INJECTION INTRAVENOUS
Status: DISCONTINUED | OUTPATIENT
Start: 2019-05-16 | End: 2019-05-16

## 2019-05-16 RX ORDER — POTASSIUM CHLORIDE 1.5 G/1.58G
20-40 POWDER, FOR SOLUTION ORAL
Status: DISCONTINUED | OUTPATIENT
Start: 2019-05-16 | End: 2019-05-18 | Stop reason: HOSPADM

## 2019-05-16 RX ORDER — POTASSIUM CHLORIDE 7.45 MG/ML
10 INJECTION INTRAVENOUS
Status: DISCONTINUED | OUTPATIENT
Start: 2019-05-16 | End: 2019-05-18 | Stop reason: HOSPADM

## 2019-05-16 RX ORDER — POTASSIUM CL/LIDO/0.9 % NACL 10MEQ/0.1L
10 INTRAVENOUS SOLUTION, PIGGYBACK (ML) INTRAVENOUS
Status: DISCONTINUED | OUTPATIENT
Start: 2019-05-16 | End: 2019-05-18 | Stop reason: HOSPADM

## 2019-05-16 RX ORDER — POTASSIUM CHLORIDE 1500 MG/1
20-40 TABLET, EXTENDED RELEASE ORAL
Status: DISCONTINUED | OUTPATIENT
Start: 2019-05-16 | End: 2019-05-18 | Stop reason: HOSPADM

## 2019-05-16 RX ADMIN — SENNOSIDES AND DOCUSATE SODIUM 1 TABLET: 8.6; 5 TABLET ORAL at 09:07

## 2019-05-16 RX ADMIN — OXYCODONE HYDROCHLORIDE 5 MG: 5 TABLET ORAL at 19:34

## 2019-05-16 RX ADMIN — DONEPEZIL HYDROCHLORIDE 10 MG: 10 TABLET ORAL at 21:12

## 2019-05-16 RX ADMIN — POTASSIUM CHLORIDE 40 MEQ: 1500 TABLET, EXTENDED RELEASE ORAL at 10:59

## 2019-05-16 RX ADMIN — SODIUM CHLORIDE, POTASSIUM CHLORIDE, SODIUM LACTATE AND CALCIUM CHLORIDE: 600; 310; 30; 20 INJECTION, SOLUTION INTRAVENOUS at 21:15

## 2019-05-16 RX ADMIN — ACETAMINOPHEN 650 MG: 325 TABLET, FILM COATED ORAL at 13:17

## 2019-05-16 RX ADMIN — OXYCODONE HYDROCHLORIDE 5 MG: 5 TABLET ORAL at 15:34

## 2019-05-16 RX ADMIN — POTASSIUM CHLORIDE 20 MEQ: 1500 TABLET, EXTENDED RELEASE ORAL at 13:17

## 2019-05-16 RX ADMIN — LEVOTHYROXINE SODIUM 50 MCG: 50 TABLET ORAL at 09:06

## 2019-05-16 RX ADMIN — SODIUM CHLORIDE, POTASSIUM CHLORIDE, SODIUM LACTATE AND CALCIUM CHLORIDE: 600; 310; 30; 20 INJECTION, SOLUTION INTRAVENOUS at 05:40

## 2019-05-16 RX ADMIN — OXYCODONE HYDROCHLORIDE 5 MG: 5 TABLET ORAL at 09:06

## 2019-05-16 ASSESSMENT — ACTIVITIES OF DAILY LIVING (ADL)
ADLS_ACUITY_SCORE: 16

## 2019-05-16 NOTE — PLAN OF CARE
Discharge Planner PT   Patient plan for discharge: Not stated  Current status: PT orders received, evaluation complete, treatment initiated. Per chart at baseline pt lives alone in condo, previously independent in all mobility. Baseline memory/cognitive deficits.    Pt supine in bed upon arrival, agreeable to therapy. Mod A and cues supine to sit with HOB elevated, VSS. Pt able to follow single-step cues, appears overwhelmed with multi-step instructions, difficulty with word-finding and memory. Min A sit > stand. Ambulated 15 feet in room with HHA of therapist; ambulated 125' with L UE support on IV pole and good tolerance. No path deviations or LOB noted. CGA stand > sit in chair, VSS. Pt left seated in chair with alarm on and needs in reach.  Barriers to return to prior living situation: Lives alone; current need for assist with all mobility; cognitive deficits; falls risk  Recommendations for discharge: TCU  Rationale for recommendations: Pt appears significantly below baseline and is at risk for falls d/t mobility and cognitive deficits; would benefit from ongoing therapy to increase independence with functional mobility and progress towards PLOF.       Entered by: Mulu Elaine 05/16/2019 5:01 PM

## 2019-05-16 NOTE — PROGRESS NOTES
Fairly comfortable. Eating some. Needs pulmonary toilet to avoid pneumonia. RT consult, get spirometry going. No CXR done yet today

## 2019-05-16 NOTE — PLAN OF CARE
A&Ox2, fluctuating disorientation to place and situation. VSS on RA. Denies pain overnight. Had MRI of head- see results. Regular diet. Bruising on right shoulder and flank. Reddened coccyx. A1 to BSC.

## 2019-05-16 NOTE — PROGRESS NOTES
05/16/19 1603   Quick Adds   Type of Visit Initial PT Evaluation   Living Environment   Lives With alone   Living Arrangements condominium   Home Accessibility no concerns   Transportation Anticipated family or friend will provide;public transportation   Living Environment Comment Pt lives alone in condo   Self-Care   Usual Activity Tolerance good   Current Activity Tolerance fair   Regular Exercise No   Equipment Currently Used at Home none   Activity/Exercise/Self-Care Comment Per OT note, family reports that pt uses microwave at home for meals, family will take grocery shopping. daughter sets up meds in a pill box and pt manages. family reports they have noted decline in cognition and word finding issues more recently   Functional Level Prior   Ambulation 0-->independent   Transferring 0-->independent   Toileting 0-->independent   Bathing 0-->independent   Cognition 1 - attention or memory deficits   Fall history within last six months yes   Number of times patient has fallen within last six months   (Pt inaccurate historian, recent fall PTA)   Which of the above functional risks had a recent onset or change? ambulation;transferring   Prior Functional Level Comment Reports I in all mobility   General Information   Onset of Illness/Injury or Date of Surgery - Date 05/15/19   Referring Physician Franky Gordon, DO   Patient/Family Goals Statement None stated   Pertinent History of Current Problem (include personal factors and/or comorbidities that impact the POC) Per chart, pt found down in condo, adm with AMS found to have 2-7 R rib fractures and R scapula fx. R distal clavicle fracture.    Precautions/Limitations fall precautions  (NWB R UE)   Weight-Bearing Status - LUE full weight-bearing   Weight-Bearing Status - RUE nonweight-bearing   Weight-Bearing Status - LLE full weight-bearing   Weight-Bearing Status - RLE full weight-bearing   General Info Comments Activity: up with assist   Cognitive  Status Examination   Orientation person  (Able to state name, month and day of birth but not year)   Level of Consciousness alert   Follows Commands and Answers Questions 75% of the time;able to follow single-step instructions   Personal Safety and Judgment intact   Memory impaired   Cognitive Comment Family reports noticing recent cognitive decline;  Pt able to follow single-step cues, appears overwhelmed with multi-step instructions, difficulty with word-finding and memory.    Pain Assessment   Patient Currently in Pain Yes, see Vital Sign flowsheet  (R scapular pain)   Integumentary/Edema   Integumentary/Edema Comments LEs appear dry with patches of discoloration   Posture    Posture Forward head position;Protracted shoulders   Range of Motion (ROM)   ROM Comment WFL as seen in transfers and gait   Strength   Strength Comments Antigravity strength present in B LEs and L UE, R UE not tested d/t NWB status, LE strength WFL as seen in transfers and gait   Bed Mobility   Bed Mobility Comments Mod A supine to sit with HOB elevated   Transfer Skills   Transfer Comments Min sit > stand   Gait   Gait Comments Ambulates with L UE support of IV pole and CGA   Balance   Balance Comments Impaired as seen in gait; requires L UE support, no gross LOB noted   Sensory Examination   Sensory Perception no deficits were identified   General Therapy Interventions   Planned Therapy Interventions bed mobility training;gait training;transfer training;balance training   Clinical Impression   Criteria for Skilled Therapeutic Intervention yes, treatment indicated   PT Diagnosis Impaired bed mobility, gait and transfers   Influenced by the following impairments Current medical status; impaired cognition   Functional limitations due to impairments Impaired functional mobility   Clinical Presentation Evolving/Changing   Clinical Presentation Rationale Medical status evolving; lives alone   Clinical Decision Making (Complexity) Low complexity  "  Therapy Frequency` daily   Predicted Duration of Therapy Intervention (days/wks) 4 days   Anticipated Equipment Needs at Discharge quad cane   Anticipated Discharge Disposition Transitional Care Facility   Risk & Benefits of therapy have been explained Yes   Patient, Family & other staff in agreement with plan of care Yes   [facility] AM-PAC  \"6 Clicks\" V.2 Basic Mobility Inpatient Short Form   1. Turning from your back to your side while in a flat bed without using bedrails? 2 - A Lot   2. Moving from lying on your back to sitting on the side of a flat bed without using bedrails? 2 - A Lot   3. Moving to and from a bed to a chair (including a wheelchair)? 3 - A Little   4. Standing up from a chair using your arms (e.g., wheelchair, or bedside chair)? 3 - A Little   5. To walk in hospital room? 3 - A Little   6. Climbing 3-5 steps with a railing? 2 - A Lot   Basic Mobility Raw Score (Score out of 24.Lower scores equate to lower levels of function) 15   Total Evaluation Time   Total Evaluation Time (Minutes) 15     "

## 2019-05-16 NOTE — PLAN OF CARE
Discharge Planner OT   Patient plan for discharge: possibly TCU  Current status: eval completed and treatment initiated. Pt demo's increased confusion. Unable to recall info, and family was able to answer. Pt lives alone in a condo at baseline. Family provides transpiration or pt walks to grocery store. Daughter sets up meds and pt manages them day to day. PT uses no AE at [facility] for ambulation. Reports she was Ind with ADL's. S/p fall with 2-7 R rib fractures, distal clavicle fx and R scapula fx. NWB RUE and sling on when OOB.  Barriers to return to prior living situation: increased confusion, difficulty problem solving, increased pain and WB restrictions  Recommendations for discharge: TCU  Rationale for recommendations: pt is not safe to discharge home alone at this time due to increased confusion and increased level of assist needed for ADL's and IADL's. Family reports they are looking at transition to [facility], agree with this long term plan.        Entered by: Lucy Martinez 05/16/2019 10:18 AM

## 2019-05-16 NOTE — PROGRESS NOTES
SW:  D: Attempted to see pt for discharge planning however pt working with PT. Will complete consult tomorrow AM.     P: SW will continue to follow for discharge planning.     PAM Lucia

## 2019-05-16 NOTE — PROGRESS NOTES
[facility]    Medicine Progress Note - Hospitalist Service       Date of Admission:  5/15/2019  Date of Service: 05/16/2019    Assessment & Plan      Danni Alvarez is a 86 year old female with a history of hypothyroidism, depression, mild cognitive impairment and colon cancer s/p colectomy who presented to the ED on 5/15/2019 for confusion after being found on the ground.  CK was elevated concerning for rhabdomyolysis.      Rhabdomyolysis  Fall   Minimally displaced right 2nd-7th rib fractures  Right scapula fracture   Non-displaced acute clavicle fracture   Patient had an unwitnessed fall 1-2 days ago and was found by her daughter at approximately 1900 yesterday evening.  At that time they were able to get the patient up and in to bed.  Then this morning the patient was complaining of right shoulder pain.  On presentation CK was elevated at 2379 but renal function was normal.  Imaging in the ED showed minimally displaced right 2nd-7th rib fractures and right scapula fracture. CT scan also showed a right clavicle fracture.  X-ray of the pelvis was normal. Trauma surgery and orthopedic surgery consulted, conservative rx for now.   Plan:  - IVF with LR at 75 mL/hr  - Strict I/Os with goal urine output of 30-50 mL/hr  - PRN Tylenol and Oxycodone  - Social work/PT/OT consulted  - CXR today    Mild cognitive impairment  Possible encephalopathy, likely metabolic   CVA?  Patient's daughter states for approximately the past 6 months the patient has had gradually worsening cognitive decline that acutely worsened yesterday evening.  Her daughter felt the patient was not making much since but by the time she went to bed she had improved near her baseline.  Then this morning the patient was confused again but is since back to baseline.  On arrival though a code stroke was called and patient was evaluated by neurology.  Initial CT head with no acute abnormalities.  A CTA of the head/neck showed mild calcified  plaque in the left carotid bifurcation and left vertebral artery V4 segment but no evidence of occlusion or stenosis.  In the ED the patient was evaluated by the stroke neurology team with no plans for tPa. MRI shows a small area of restricted diffusion in the subcortical white matter of the left frontal lobe. This is suggestive of a recent small, 6 mm lacunar type infarct.  - PTA Aricept  - OT evaluation   - Continue to monitor   - Neurology following and appreciate their assistance.     Hypothyroidism   Last TSH was 0.89 in 2017  - PTA Synthroid  - TSH level ordered    H/o Colon cancer  Status post colectomy. Thought to be curative        Diet: Combination Diet Regular Diet Adult    DVT Prophylaxis: Pneumatic Compression Devices  Alan Catheter: not present  Code Status: Full Code      Disposition Plan   Expected discharge: 2 - 3 days, recommended to transitional care unit once adequate pain management/ tolerating PO medications.  Entered: Ru Albarado MD 05/16/2019, 11:26 AM       Patient, family, interdisciplinary team involved in care and agrees with plan.  Total time - Greater than 35 min. More than 50% of time spent in direct patient care, care coordination, patient/caregiver counseling, and formalizing plan of care.     The patient's care was discussed with the Bedside Nurse, Patient and Patient's Family.    Ru Albarado MD  Hospitalist Service  [facility]    ______________________________________________________________________    Interval History     No acute events overnight  Pain stable, no CP/SOB today  No nausea/vomiting. No abdominal pain  No fevers, no urinary complaints    Data reviewed today: I reviewed all medications, new labs and imaging results over the last 24 hours. I personally reviewed no images or EKG's today.    Physical Exam   Vital Signs: Temp: 98.8  F (37.1  C) Temp src: Oral BP: 150/62 Pulse: 94 Heart Rate: 88 Resp: 16 SpO2: 93 % O2 Device: None (Room air)    Weight:  120 lbs 0 oz     General Appearance: Resting comfortably.  NAD   Respiratory: Clear to auscultation.  No respiratory distress  Cardiovascular: RRR.  No murmurs  GI: Bowel sounds present.  Non-tender  Skin: Multiple bruises noted to lower extremity.  No obvious rashes.  No cyanosis  Musculoskeletal: Right chest wall is tender.  No lower extremity edema.  No calf tenderness  Neurologic: No focal deficits.  CN intact  Psychiatric: Pleasant and alert.  Oriented to person and place but not month        Data   Recent Labs   Lab 05/16/19  0707 05/15/19  1223 05/15/19  1031   WBC  --   --  8.0   HGB  --   --  12.9   MCV  --   --  102*   PLT  --   --  181   INR  --   --  0.94     --  139   POTASSIUM 3.2*  --  3.7   CHLORIDE 111*  --  105   CO2 28  --  27   BUN 21  --  27   CR 0.49*  --  0.68   ANIONGAP 5  --  7   ELY 8.1*  --  8.9   *  --  167*   TROPI  --  <0.015 <0.015     Recent Results (from the past 24 hour(s))   Chest CT w/o contrast    Narrative    CT CHEST WITHOUT CONTRAST   5/15/2019 1:49 PM     HISTORY: Multiple rib fractures and possible right scapular fracture  on right shoulder x-ray.    COMPARISON: 12/11/2006 - CT abdomen and pelvis.    TECHNIQUE: Without intravenous contrast, helical sections were  acquired through the lungs. Coronal reconstructions were generated.  Radiation dose for this scan was reduced using automated exposure  control, adjustment of the mA and/or kV according to the patient's  size, or iterative reconstruction technique.    FINDINGS: Mildly displaced acute fractures of the posterior aspects of  the right second through sixth ribs and lateral aspects of the right  fourth through seventh ribs. Mildly to moderately displaced acute  fracture of the lateral aspects of the right third rib. Probable acute  mildly displaced fractures of the lateral aspect of the right tenth  rib. Nondisplaced acute fracture of the mid and distal aspects of the  right clavicle. Mildly displaced  vertically-oriented acute fracture of  the scapular body, without involvement of the glenoid process. Partial  visualization of a probable hematoma within the soft tissues overlying  the scapular fracture (for example, series 2 image 24). Old fracture  of the mid to distal left clavicle.    Mild atelectasis in bilateral lung bases. A few very tiny less than  0.4 cm diameter nodules are present in the upper right lung. For  example, there is a 0.2 cm nodule in the lateral aspect of the right  upper lobe (series 4 image 70). Mild diffuse bronchiectasis. A very  tiny focus of gas is present in the right pleural space. A trace  amount of right pleural fluid. No left pleural or pericardial  effusion. No enlarged lymph nodes in the chest. Atherosclerotic  calcification in the thoracic aorta and coronary arteries.    Scan through the upper abdomen is unremarkable.      Impression    IMPRESSION:  1. Acute fractures of the right second through seventh ribs and the  right tenth rib. A few of the fractures are mildly and moderately  displaced. There is a very tiny focus of gas in the right pleural  space and a trace amount of right pleural fluid, but no significant  pneumothorax.  2. Nondisplaced acute vertically-oriented fracture of the scapular  body. Partial visualization of a probable hematoma within the soft  tissues overlying the scapular fracture.  3. Nondisplaced acute fracture of the mid and distal aspects of the  right clavicle.  4. A few tiny indeterminate right lung nodules. These are very likely  benign nodules. Recommend comparison with prior imaging studies, if  available. If not available and the patient is a smoker or has other  significant risk factors, a followup CT scan could be considered in 12  months to confirm stability.    MICHAEL DIEZ MD   MR Brain w/o & w Contrast    Narrative    MRI BRAIN WITHOUT AND WITH CONTRAST  5/15/2019 11:40 PM    HISTORY:  Altered mental status, (LOC), unexplained      TECHNIQUE:  Multiplanar, multisequence MRI of the brain without and  with 5mL Gadavist    COMPARISON: CT dated 5/15/2018    FINDINGS:     Intracranial contents: There is diffuse parenchymal volume loss.   White matter changes are present in the cerebral hemispheres that are  consistent with small vessel ischemic disease in this age patient.  There appears to be a small area of restricted diffusion in the  subcortical white matter of the left frontal lobe. This could  represent a small recent lacunar type infarct. This only measures  about 6 mm in size.  No acute hemorrhage or mass effect.  There are  small susceptibility hypointensities in the left occipital region  consistent with hemosiderin deposition secondary to small chronic  microhemorrhages. The arteries at the base of the brain and the dural  venous sinuses appear patent.     Sella:  No significant abnormality accounting for technique.     Orbit: No significant abnormality accounting for technique.      Sinus/mastoids: No significant paranasal sinus mucosal disease. No  significant middle ear or mastoid effusion.    Bones/soft tissues: No aggressive osseous lesion involving the  calvarium, skull base, or visualized upper cervical spine.       Impression    IMPRESSION:    1. There appears to be a small area of restricted diffusion in the  subcortical white matter of the left frontal lobe. This is suggestive  of a recent small, 6 mm lacunar type infarct.  2. There is diffuse parenchymal volume loss.  White matter changes are  present in the cerebral hemispheres that are consistent with small  vessel ischemic disease in this age patient..  3. No acute bleed or mass effect.    4. Small susceptibility hypointensities in the left occipital region  consistent with hemosiderin deposition secondary to small chronic  microhemorrhages. This can be seen in patients with chronic  hypertension. Cerebral amyloid could potentially have this appearance.      BARNEY BEST  MD     Medications     lactated ringers 75 mL/hr at 05/16/19 0510       donepezil  10 mg Oral At Bedtime     levothyroxine  50 mcg Oral Daily

## 2019-05-16 NOTE — PROGRESS NOTES
05/16/19 0951   Quick Adds   Type of Visit Initial Occupational Therapy Evaluation   Living Environment   Lives With alone   Living Arrangements condominium   Home Accessibility no concerns   Transportation Anticipated family or friend will provide;public transportation   Living Environment Comment pt lives alone in a condo   Self-Care   Usual Activity Tolerance good   Current Activity Tolerance fair   Regular Exercise No   Equipment Currently Used at Home none   Activity/Exercise/Self-Care Comment family reports that pt uses microwave at home for meals, family will take grocery shopping. daughter sets up meds in a pill box and pt manages. family reports they have noted decline in cognition and word finding issues more recently   Functional Level   Cognition 1 - attention or memory deficits   General Information   Onset of Illness/Injury or Date of Surgery - Date 05/15/19   Referring Physician Franky Gordon DO.    Patient/Family Goals Statement none stated   Additional Occupational Profile Info/Pertinent History of Current Problem pt found down in condo, adm with AMS found to have 2-7 R rib fractures and R scapula fx. R distal clavicle fracture.    Precautions/Limitations fall precautions   General Observations awaiting clarification from ortho on R ROM restrictions. sling to RUE when in chair/amb. NWB RUE   General Info Comments daughter present   Cognitive Status Examination   Orientation person   Level of Consciousness confused   Follows Commands (Cognition) increased processing time needed;repetition of directions required;follows one step commands   Memory impaired   Attention No deficits were identified   Organization/Problem Solving Problem solving impaired   Executive Function Self awareness/monitoring impaired;Planning ability impaired   Cognitive Comment daughter states family has noticed decline in cogntion.    Visual Perception   Visual Perception Wears glasses   Visual Perception Comments denies  blurry or double vision   Sensory Examination   Sensory Quick Adds No deficits were identified   Sensory Comments denies numbness or tingling   Pain Assessment   Patient Currently in Pain Yes, see Vital Sign flowsheet   Integumentary/Edema   Integumentary/Edema no deficits were identifed   Range of Motion (ROM)   ROM Comment L WFL, R NT due to no clarification of ROM orders   Strength   Strength Comments L UE 3+/5   Hand Strength   Hand Strength Comments WFL   Muscle Tone Assessment   Muscle Tone Quick Adds No deficits were identified   Coordination   Upper Extremity Coordination Right UE impaired   Mobility   Bed Mobility Comments mod A   Transfer Skill: Bed to Chair/Chair to Bed   Level of [place]: Bed to Chair minimum assist (75% patients effort)   Transfer Skill: Sit to Stand   Level of [place]: Sit/Stand contact guard   Physical Assist/Nonphysical Assist: Sit/Stand 1 person assist   Transfer Skill: Toilet Transfer   Level of [place]: Toilet minimum assist (75% patients effort)   Physical Assist/Nonphysical Assist: Toilet 1 person assist  (to commode)   Bathing   Level of [place] maximum assist (25% patients effort)   Upper Body Dressing   Level of [place]: Dress Upper Body maximum assist (25% patients effort)   Lower Body Dressing   Level of [place]: Dress Lower Body moderate assist (50% patients effort)   Toileting   Level of [place]: Toilet maximum assist (25% patients effort)   Eating/Self Feeding   Level of [place]: Eating stand-by assist   Physical Assist/Nonphysical Assist: Eating set-up required   Instrumental Activities of Daily Living (IADL)   Previous Responsibilities meal prep;shopping;housekeeping  (daughter sets up meds, but pt manages daily)   General Therapy Interventions   Planned Therapy Interventions ADL retraining;IADL retraining;balance training;cognition;strengthening;transfer training;progressive activity/exercise   Clinical Impression   Criteria for  "Skilled Therapeutic Interventions Met yes, treatment indicated   OT Diagnosis decreased I with ADL's and functional mobility   Influenced by the following impairments decreased cognition, increased pain, WB restrictions   Assessment of Occupational Performance 3-5 Performance Deficits   Identified Performance Deficits dressing, bathing, and home mgmt   Clinical Decision Making (Complexity) Moderate complexity   Therapy Frequency daily   Predicted Duration of Therapy Intervention (days/wks) 4 days   Anticipated Discharge Disposition Transitional Care Facility   Risks and Benefits of Treatment have been explained. Yes   Patient, Family & other staff in agreement with plan of care Yes   [facility] TM \"6 Clicks\"   2016, Trustees of [facility], under license to [employer].  All rights reserved.   6 Clicks Short Forms Daily Activity Inpatient Short Form   [facility]  \"6 Clicks\" Daily Activity Inpatient Short Form   1. Putting on and taking off regular lower body clothing? 2 - A Lot   2. Bathing (including washing, rinsing, drying)? 2 - A Lot   3. Toileting, which includes using toilet, bedpan or urinal? 2 - A Lot   4. Putting on and taking off regular upper body clothing? 2 - A Lot   5. Taking care of personal grooming such as brushing teeth? 3 - A Little   6. Eating meals? 3 - A Little   Daily Activity Raw Score (Score out of 24.Lower scores equate to lower levels of function) 14   Total Evaluation Time   Total Evaluation Time (Minutes) 10     "

## 2019-05-17 ENCOUNTER — APPOINTMENT (OUTPATIENT)
Dept: PHYSICAL THERAPY | Facility: CLINIC | Age: 84
DRG: 564 | End: 2019-05-17
Payer: COMMERCIAL

## 2019-05-17 PROCEDURE — 99231 SBSQ HOSP IP/OBS SF/LOW 25: CPT | Performed by: SURGERY

## 2019-05-17 PROCEDURE — 99232 SBSQ HOSP IP/OBS MODERATE 35: CPT | Performed by: STUDENT IN AN ORGANIZED HEALTH CARE EDUCATION/TRAINING PROGRAM

## 2019-05-17 PROCEDURE — 97530 THERAPEUTIC ACTIVITIES: CPT | Mod: GP

## 2019-05-17 PROCEDURE — 12000000 ZZH R&B MED SURG/OB

## 2019-05-17 PROCEDURE — 25000132 ZZH RX MED GY IP 250 OP 250 PS 637: Performed by: INTERNAL MEDICINE

## 2019-05-17 PROCEDURE — 97116 GAIT TRAINING THERAPY: CPT | Mod: GP

## 2019-05-17 RX ADMIN — OXYCODONE HYDROCHLORIDE 5 MG: 5 TABLET ORAL at 18:43

## 2019-05-17 RX ADMIN — LEVOTHYROXINE SODIUM 50 MCG: 50 TABLET ORAL at 07:34

## 2019-05-17 RX ADMIN — ACETAMINOPHEN 650 MG: 325 TABLET, FILM COATED ORAL at 07:34

## 2019-05-17 RX ADMIN — OXYCODONE HYDROCHLORIDE 5 MG: 5 TABLET ORAL at 12:06

## 2019-05-17 RX ADMIN — DONEPEZIL HYDROCHLORIDE 10 MG: 10 TABLET ORAL at 21:39

## 2019-05-17 RX ADMIN — ACETAMINOPHEN 650 MG: 325 TABLET, FILM COATED ORAL at 15:10

## 2019-05-17 ASSESSMENT — ACTIVITIES OF DAILY LIVING (ADL)
WHICH_OF_THE_ABOVE_FUNCTIONAL_RISKS_HAD_A_RECENT_ONSET_OR_CHANGE?: FALL HISTORY
SWALLOWING: 0-->SWALLOWS FOODS/LIQUIDS WITHOUT DIFFICULTY
AMBULATION: 0-->INDEPENDENT
COGNITION: 1 - ATTENTION OR MEMORY DEFICITS
FALL_HISTORY_WITHIN_LAST_SIX_MONTHS: YES
DRESS: 0-->INDEPENDENT
ADLS_ACUITY_SCORE: 14
ADLS_ACUITY_SCORE: 16
ADLS_ACUITY_SCORE: 14
ADLS_ACUITY_SCORE: 16
RETIRED_EATING: 0-->INDEPENDENT
RETIRED_COMMUNICATION: 0-->UNDERSTANDS/COMMUNICATES WITHOUT DIFFICULTY
NUMBER_OF_TIMES_PATIENT_HAS_FALLEN_WITHIN_LAST_SIX_MONTHS: 1
BATHING: 0-->INDEPENDENT
TOILETING: 0-->INDEPENDENT
ADLS_ACUITY_SCORE: 14
TRANSFERRING: 0-->INDEPENDENT
ADLS_ACUITY_SCORE: 14

## 2019-05-17 NOTE — PLAN OF CARE
OT: pt declined OT states she is pretty tired and would like to rest. Encouraged her to walk with PT later after she rests.

## 2019-05-17 NOTE — PLAN OF CARE
Discharge Planner PT   Patient plan for discharge: Not stated  Current status: Pt supine in bed upon arrival, agreeable to therapy. NWB R UE in sling. C/o increased pain in R shoulder. Performed supine <> sit with HOB elevated and Mod A and single step cues. Sit <> stand transfer completed with CGA. Ambulated 15 ft w/o assistive device and CGA. No LOB however patient reaching out for environmental objects. Ambulated 150 ft x 1 with support of IV pole using LUE and CGA; improved stability noted. Will trial SEC next session. Returned to bed at end of session. Required Mod-Max A and cues for repositioning.   Barriers to return to prior living situation: Lives alone; current need for assist with all mobility; cognitive deficits; falls risk  Recommendations for discharge: TCU per plan established by the PT.  Rationale for recommendations: Pt appears significantly below baseline and is at risk for falls d/t mobility and cognitive deficits; would benefit from ongoing therapy to increase independence with functional mobility and progress towards PLOF.  Entered by: Pam Talley 05/17/2019 11:56 AM

## 2019-05-17 NOTE — PLAN OF CARE
A&O x1. Difficult to assess due to word finiding difficulty from prior stroke. VSS- baseline HTN. C/o pain to right shoulder and ribs 5-8/10. Managed with ice, PRN Tylenol and oxycodone. Up A1 with gait belt and walker. Regular diet. Tolerating well. IV SL. LS clear. Continent of bowel and bladder. BS active, + Flatus. Voiding adequately. Progressing toward plan of care. Discharge tomorrow to [facility] TCU pending.

## 2019-05-17 NOTE — PROGRESS NOTES
SW:  D: SW has completed PAS in anticipation of discharge tomorrow.     PAS-RR    D: Per DHS regulation, SW completed and submitted PAS-RR to [place] [employer] Direct Connect via the Senior LinkAge Line.  PAS-RR confirmation # is : [id number]      P: Further questions may be directed to [facility] LinkAge Line at #[phone], option #4 for PAS-RR staff.    P: SANTIAGO will continue to follow for discharge planning.     PAM Lucia

## 2019-05-17 NOTE — PLAN OF CARE
Alert to self only. Elevated BP's, parameters not met for PRNs. Declined any pain interventions. Ax1 with walker and gait belt up to BSC. Voiding adequately. Passing gas. LS clear. BS active. IVF infusing at 75cc/hr. Reg diet. Sling on R arm. Will likely discharge to TCU today.

## 2019-05-17 NOTE — CONSULTS
SW Consult Note:    Care Transition Initial Assessment - SW     Met with: Family (Arely - Daughter)   Active Problems:    Rhabdomyolysis       DATA  Lives With: alone   Living Arrangements: condominium  Quality of Family Relationships: helpful, involved, supportive  Description of Support System: Supportive, Involved  Who is your support system?: Children  Support Assessment: Adequate family and caregiver support, Adequate social supports.   Identified issues/concerns regarding health management: Per social service protocol for discharge planning, patient was admitted on 5/15/19 with a primary diagnosis of Rhabdomyolysis. The tentative date for discharge is 5/17 or 5/18.  Reviewed chart and spoke with patient's daughter Arely regarding discharge planning.  Daughter states that patient has been living alone in her condo and they just started looking at ALFs on Monday and then patient had a fall.  Discussed discharge recommendations of TCU placement and daughter is in agreement.  Daughter stated that they were considering [facility] and were inquiring about TCU at [facility].  Daughter also requested referrals be sent to [facility].  Daughter requesting a shared room. SW sent referrals via [facility].  SW discussed transportation options and costs with daughter.         Quality of Family Relationships: helpful, involved, supportive  Transportation Anticipated: family or friend will provide, public transportation    ASSESSMENT  Cognitive Status:  alert and oriented to self.   Concerns to be addressed: Discharge planning.      PLAN  Financial costs for the patient includes: Discussed transportation costs.   Patient given options and choices for discharge: Yes  Patient/family is agreeable to the plan?  Yes  Patient Goals and Preferences: TCU  Patient anticipates discharging to:  TCU    Addendum:  Patient has been accepted by [facility].  SW spoke with patient, patient's daughter Arely and granddaughter  and they would like to confirm the bed.  Family will plan to transport patient tomorrow and will be here around 11:30-noon to head to facility.  Updated facility and physician regarding discharge plan.     KIRSTEN Heredia, LGSW

## 2019-05-17 NOTE — PLAN OF CARE
Alert. Oriented to self. Fluctuating orientation to place/time/situation. VSS, slightly elevated BPs at times - parallels with c/o pain. Pain in right shoulder/flank - alternating PRN oxycodone and tylenol. Up A1/GB/Walker. Good UOP, using bedside commode. Regular diet. PT/OT following. Likely discharge to TCU. Will continue to monitor.

## 2019-05-17 NOTE — PROGRESS NOTES
SANTIAGO Note:    D/I: Left VM for patient's daughter Stephanie to discuss discharge planning for patient.    P: SW will continue to assist for discharge planning.    KIRSTEN Heredia, LGSW

## 2019-05-17 NOTE — PROGRESS NOTES
[facility]    Medicine Progress Note - Hospitalist Service       Date of Admission:  5/15/2019  Date of Service: 05/17/2019    Assessment & Plan      Danni Alvarez is a 86 year old female with a history of hypothyroidism, depression, mild cognitive impairment and colon cancer s/p colectomy who presented to the ED on 5/15/2019 for confusion after being found on the ground.  CK was elevated concerning for rhabdomyolysis.      Rhabdomyolysis  Fall   Minimally displaced right 2nd-7th rib fractures  Right scapula fracture   Non-displaced acute clavicle fracture   Patient had an unwitnessed fall 1-2 days ago and was found by her daughter at approximately 1900 yesterday evening.  At that time they were able to get the patient up and in to bed.  Then this morning the patient was complaining of right shoulder pain.  On presentation CK was elevated at 2379 but renal function was normal.  Imaging in the ED showed minimally displaced right 2nd-7th rib fractures and right scapula fracture. CT scan also showed a right clavicle fracture.  X-ray of the pelvis was normal. Trauma surgery and orthopedic surgery consulted, conservative rx for now. Repeat CXR 05/16 showed no significant change in multiple right-sided rib  fractures since the comparison study.  Plan:  - IVF with LR at 75 mL/hr for today  - Strict I/Os with goal urine output of 30-50 mL/hr  - PRN Tylenol and Oxycodone  - Social work/PT/OT consulted, will need TCU    Mild cognitive impairment  Possible encephalopathy, likely metabolic   CVA?  Patient's daughter states for approximately the past 6 months the patient has had gradually worsening cognitive decline that acutely worsened yesterday evening.  Her daughter felt the patient was not making much since but by the time she went to bed she had improved near her baseline.  Then this morning the patient was confused again but is since back to baseline.  On arrival though a code stroke was called and  patient was evaluated by neurology.  Initial CT head with no acute abnormalities.  A CTA of the head/neck showed mild calcified plaque in the left carotid bifurcation and left vertebral artery V4 segment but no evidence of occlusion or stenosis.  In the ED the patient was evaluated by the stroke neurology team with no plans for tPa. MRI shows a small area of restricted diffusion in the subcortical white matter of the left frontal lobe. This is suggestive of a recent small, 6 mm lacunar type infarct, but there is no correlate on ADC imaging and there this would not represent an acute stroke. Neuro was consulted, no further stroke work up indicated.   - PTA Aricept  - OT evaluation   - Continue to monitor     Hypothyroidism   Last TSH was 0.89 in 2017  - PTA Synthroid  - TSH level ordered    H/o Colon cancer  Status post colectomy. Thought to be curative        Diet: Combination Diet Regular Diet Adult    DVT Prophylaxis: Pneumatic Compression Devices  Alan Catheter: not present  Code Status: Full Code      Disposition Plan   Expected discharge: 2 - 3 days, recommended to transitional care unit once adequate pain management/ tolerating PO medications and safe disposition plan/ TCU bed available.  Entered: Ru Albarado MD 05/17/2019, 10:29 AM       The patient's care was discussed with the Bedside Nurse, Patient and Patient's Family.    Ru Albarado MD  Hospitalist Service  [facility]    ______________________________________________________________________    Interval History     No acute events overnight  Forgetful this AM. Calm and alert.  Pain stable, no nbew CP/SOB today  No nausea/vomiting. No abdominal pain  No fevers, no new urinary complaints. No new complaints overall today    Data reviewed today: I reviewed all medications, new labs and imaging results over the last 24 hours. I personally reviewed no images or EKG's today.    Physical Exam   Vital Signs: Temp: 98.3  F (36.8  C) Temp src: Oral  BP: 165/85   Heart Rate: 85 Resp: 18 SpO2: 92 % O2 Device: None (Room air)    Weight: 120 lbs 0 oz     General Appearance: Resting comfortably.  NAD   Respiratory: CTABL  Cardiovascular: RRR.  No murmurs  GI: Bowel sounds present.  Non-tender  Skin: Multiple bruises noted to lower extremity.  No obvious rashes.  No cyanosis  Musculoskeletal: Right chest wall is tender.  No lower extremity edema.  No calf tenderness  Neurologic: No focal deficits.  CN intact  Psychiatric: Pleasant and alert.  Oriented to person and place but not month/year       Data   Recent Labs   Lab 05/16/19  1717 05/16/19  0707 05/15/19  1223 05/15/19  1031   WBC  --   --   --  8.0   HGB  --   --   --  12.9   MCV  --   --   --  102*   PLT  --   --   --  181   INR  --   --   --  0.94   NA  --  144  --  139   POTASSIUM 4.0 3.2*  --  3.7   CHLORIDE  --  111*  --  105   CO2  --  28  --  27   BUN  --  21  --  27   CR  --  0.49*  --  0.68   ANIONGAP  --  5  --  7   ELY  --  8.1*  --  8.9   GLC  --  128*  --  167*   TROPI  --   --  <0.015 <0.015     Recent Results (from the past 24 hour(s))   XR Chest 2 Views    Narrative    CHEST TWO VIEWS 5/16/2019 12:02 PM     HISTORY: Rib fractures, trauma.    COMPARISON: May 15, 2019     FINDINGS: Right chest wall rib deformities similar to previous. No  pneumothorax. Left lung clear. The cardiac silhouette is not enlarged.  Pulmonary vasculature is unremarkable.      Impression    IMPRESSION: No significant change in multiple right-sided rib  fractures since the comparison study.    CAR DENNY MD     Medications     lactated ringers 75 mL/hr at 05/16/19 2115       donepezil  10 mg Oral At Bedtime     levothyroxine  50 mcg Oral Daily

## 2019-05-17 NOTE — PROGRESS NOTES
Afebrile, a bit confused. Up  Some. Pain moderate. Eating  CXR: I see no new pneumo. Lungs look pretty good.   To TCU with incentive spirometry

## 2019-05-18 ENCOUNTER — APPOINTMENT (OUTPATIENT)
Dept: PHYSICAL THERAPY | Facility: CLINIC | Age: 84
DRG: 564 | End: 2019-05-18
Payer: COMMERCIAL

## 2019-05-18 VITALS
RESPIRATION RATE: 20 BRPM | HEART RATE: 94 BPM | WEIGHT: 120 LBS | HEIGHT: 64 IN | OXYGEN SATURATION: 94 % | TEMPERATURE: 97.4 F | DIASTOLIC BLOOD PRESSURE: 82 MMHG | SYSTOLIC BLOOD PRESSURE: 167 MMHG | BODY MASS INDEX: 20.49 KG/M2

## 2019-05-18 PROBLEM — R73.09 ABNORMAL GLUCOSE: Status: ACTIVE | Noted: 2019-05-18

## 2019-05-18 PROBLEM — B00.9 HERPES SIMPLEX VIRUS (HSV) INFECTION: Status: ACTIVE | Noted: 2019-05-18

## 2019-05-18 PROBLEM — R32 URINARY INCONTINENCE: Status: ACTIVE | Noted: 2017-04-20

## 2019-05-18 PROBLEM — K21.9 GASTROESOPHAGEAL REFLUX DISEASE: Status: ACTIVE | Noted: 2017-04-19

## 2019-05-18 PROBLEM — F41.1 ANXIETY STATE: Status: ACTIVE | Noted: 2019-05-18

## 2019-05-18 PROBLEM — E78.5 HYPERLIPIDEMIA: Status: ACTIVE | Noted: 2017-12-22

## 2019-05-18 PROBLEM — E53.8 B12 DEFICIENCY: Status: ACTIVE | Noted: 2017-04-19

## 2019-05-18 PROBLEM — F32.0 MAJOR DEPRESSIVE DISORDER, SINGLE EPISODE, MILD (H): Status: ACTIVE | Noted: 2019-05-18

## 2019-05-18 PROBLEM — C18.9 MALIGNANT NEOPLASM OF COLON (H): Status: ACTIVE | Noted: 2019-05-18

## 2019-05-18 PROCEDURE — 25000132 ZZH RX MED GY IP 250 OP 250 PS 637: Performed by: INTERNAL MEDICINE

## 2019-05-18 PROCEDURE — 99231 SBSQ HOSP IP/OBS SF/LOW 25: CPT | Performed by: SURGERY

## 2019-05-18 PROCEDURE — 99239 HOSP IP/OBS DSCHRG MGMT >30: CPT | Performed by: STUDENT IN AN ORGANIZED HEALTH CARE EDUCATION/TRAINING PROGRAM

## 2019-05-18 PROCEDURE — 97116 GAIT TRAINING THERAPY: CPT | Mod: GP

## 2019-05-18 RX ORDER — ACETAMINOPHEN 325 MG/1
650 TABLET ORAL EVERY 4 HOURS PRN
DISCHARGE
Start: 2019-05-18 | End: 2019-05-20

## 2019-05-18 RX ORDER — ALPRAZOLAM 0.25 MG
0.25 TABLET ORAL
Qty: 12 TABLET | Refills: 0 | Status: SHIPPED | OUTPATIENT
Start: 2019-05-18 | End: 2019-05-20

## 2019-05-18 RX ORDER — OXYCODONE HYDROCHLORIDE 5 MG/1
5 TABLET ORAL EVERY 4 HOURS PRN
Qty: 15 TABLET | Refills: 0 | Status: SHIPPED | OUTPATIENT
Start: 2019-05-18 | End: 2019-06-03

## 2019-05-18 RX ADMIN — OXYCODONE HYDROCHLORIDE 5 MG: 5 TABLET ORAL at 03:19

## 2019-05-18 RX ADMIN — LEVOTHYROXINE SODIUM 50 MCG: 50 TABLET ORAL at 08:49

## 2019-05-18 ASSESSMENT — ACTIVITIES OF DAILY LIVING (ADL)
ADLS_ACUITY_SCORE: 14

## 2019-05-18 NOTE — PLAN OF CARE
Discharge Planner PT   Patient plan for discharge: Not stated  Current status: Pt supine in bed upon arrival, agreeable to therapy. NWB R UE in sling. Performed supine <> sit with HOB elevated and Min A; increased time to scoot to EOB. Sit <> stand transfer completed with SBA. Ambulated 400+ ft x 1 with no assistive device and CGA. Slight WBOS noted but overall steady and no LOB. Noted patient responds better to single step cues. Up in chair at end of session.   Barriers to return to prior living situation: Lives alone; current need for assist with all mobility; cognitive deficits; falls risk  Recommendations for discharge: TCU per plan established by the PT.  Rationale for recommendations: Pt appears significantly below baseline and is at risk for falls d/t mobility and cognitive deficits; would benefit from ongoing therapy to increase independence with functional mobility and progress towards PLOF.  Entered by: Pam Talley 05/18/2019 8:27 AM        Patient discharged to TCU. PT goals partially met.

## 2019-05-18 NOTE — PROGRESS NOTES
SW:      D: SANTIAGO following for discharge needs. Received discharge order. Bed available at [facility] for today. SANTIAGO faxed orders & script to [facility] (fax [phone]). SANTIAGO    P: No other SW needs at this time.

## 2019-05-18 NOTE — PROGRESS NOTES
Trauma surgery    Patient sitting up eating breakfast.  Able to get incentive spirometry up to 900 multiple times.  Pain controlled.    Chest-equal bilaterally    A/P  Doing well.  Able to use spirometry at bedside.  Okay for discharge from trauma standpoint. Expressed importance of continued use of spirometry/Pulmonary toliet at TCU. Please call if any issues.    Juan Carlos Obando M.D.  [facility]  [phone]

## 2019-05-18 NOTE — PLAN OF CARE
Pt is alert to self- anxious at times. VSS ex elevated BP. Up A1 GB and walker, RWB to RUE, sling in place. Coccyx reddened, repositioned as tolerated. C/o of pain, difficult to assess location- PRN oxy given x1. Possible discharge to [facility] today. Slept between cares.

## 2019-05-18 NOTE — DISCHARGE SUMMARY
[facility]  Hospitalist Discharge Summary       Date of Admission:  5/15/2019  Date of Discharge:  5/18/2019  Discharging Provider: Ru Albarado MD      Discharge Diagnoses     Rhabdomyolysis  Fall   Minimally displaced right 2nd-7th rib fractures  Right scapula fracture   Non-displaced acute clavicle fracture     Follow-ups Needed After Discharge   Follow-up Appointments     Follow Up and recommended labs and tests      Follow up with [facility] physician.  The following labs/tests are   recommended: None.           Unresulted Labs Ordered in the Past 30 Days of this Admission     No orders found from 3/16/2019 to 5/16/2019.        Discharge Disposition   Discharged to rehabilitation facility  Condition at discharge: Stable    Hospital Course         Danni Alvarez is a 86 year old female with a history of hypothyroidism, depression, mild cognitive impairment and colon cancer s/p colectomy who presented to the ED on 5/15/2019 for confusion after being found on the ground.  CK was elevated concerning for rhabdomyolysis.      Rhabdomyolysis  Fall   Minimally displaced right 2nd-7th rib fractures  Right scapula fracture   Non-displaced acute clavicle fracture   Patient had an unwitnessed fall 1-2 days ago and was found by her daughter at approximately 1900 yesterday evening.  At that time they were able to get the patient up and in to bed.  Then this morning the patient was complaining of right shoulder pain.  On presentation CK was elevated at 2379 but renal function was normal.  Imaging in the ED showed minimally displaced right 2nd-7th rib fractures and right scapula fracture. CT scan also showed a right clavicle fracture.  X-ray of the pelvis was normal. Trauma surgery and orthopedic surgery consulted, conservative rx for now. Repeat CXR 05/16 showed no significant change in multiple right-sided rib  fractures since the comparison study.  Plan:  - PRN Tylenol and Oxycodone  - statin held,  resume as outpatient with PCP    Mild cognitive impairment  Metabolic Encephalopathy  Patient's daughter states for approximately the past 6 months the patient has had gradually worsening cognitive decline that acutely worsened yesterday evening.  Her daughter felt the patient was not making much since but by the time she went to bed she had improved near her baseline.  Then this morning the patient was confused again but is since back to baseline.  On arrival though a code stroke was called and patient was evaluated by neurology.  Initial CT head with no acute abnormalities.  A CTA of the head/neck showed mild calcified plaque in the left carotid bifurcation and left vertebral artery V4 segment but no evidence of occlusion or stenosis.  In the ED the patient was evaluated by the stroke neurology team with no plans for tPa. MRI shows a small area of restricted diffusion in the subcortical white matter of the left frontal lobe. This is suggestive of a recent small, 6 mm lacunar type infarct, but there is no correlate on ADC imaging and there this would not represent an acute stroke. Neuro was consulted, no further stroke work up indicated.   - PTA Aricept  - OT    Hypothyroidism  - PTA Synthroid  - TSH level 1.12 on 05/12/2019    H/o Colon cancer  Status post colectomy. Thought to be curative     Consultations This Hospital Stay   TRAUMA SURGERY IP CONSULT  SOCIAL WORK IP CONSULT  PHYSICAL THERAPY ADULT IP CONSULT  OCCUPATIONAL THERAPY ADULT IP CONSULT  NEUROLOGY IP CONSULT  ORTHOPEDIC SURGERY IP CONSULT  RESPIRATORY CARE IP CONSULT  PHYSICAL THERAPY ADULT IP CONSULT  OCCUPATIONAL THERAPY ADULT IP CONSULT    Code Status   Full Code    Time Spent on this Encounter   IRu, personally saw the patient today and spent greater than 30 minutes discharging this patient.       Ru Albarado MD  [facility]  ______________________________________________________________________    Physical Exam   Vital  Signs: Temp: 97.4  F (36.3  C) Temp src: Oral BP: 167/82   Heart Rate: 88 Resp: 20 SpO2: 94 % O2 Device: None (Room air)    Weight: 120 lbs 0 oz    Primary Care Physician   Willow Phelan MD    Discharge Orders      General info for SNF    Length of Stay Estimate: Short Term Care: Estimated # of Days <30  Condition at Discharge: Stable  Level of care:skilled   Rehabilitation Potential: Good  Admission H&P remains valid and up-to-date: Yes  Recent Chemotherapy: N/A  Use Nursing Home Standing Orders: Yes     Mantoux instructions    Give two-step Mantoux (PPD) Per Facility Policy Yes     Reason for your hospital stay    You had a rib fracture and muscle injury     Intake and output    Every shift     Daily weights    Call Provider for weight gain of more than 2 pounds per day or 5 pounds per week.     Activity - Up with assistive device     Activity - Up with nursing assistance     Follow Up and recommended labs and tests    Follow up with detention physician.  The following labs/tests are recommended: None.     Physical Therapy Adult Consult    Evaluate and treat as clinically indicated.    Reason:  Physical deconditioning     Occupational Therapy Adult Consult    Evaluate and treat as clinically indicated.    Reason:  Cognitive impairment     Advance Diet as Tolerated    Follow this diet upon discharge: Orders Placed This Encounter      Combination Diet Regular Diet Adult       Significant Results and Procedures   Most Recent 3 CBC's:  Recent Labs   Lab Test 05/15/19  1031 09/19/17  0635 06/16/17  1215   WBC 8.0 3.4* 4.3   HGB 12.9 13.5 13.1   * 98 99    184 175     Most Recent 3 BMP's:  Recent Labs   Lab Test 05/16/19  1717 05/16/19  0707 05/15/19  1031 09/19/17  0635   NA  --  144 139 139   POTASSIUM 4.0 3.2* 3.7 4.2   CHLORIDE  --  111* 105 103   CO2  --  28 27 27   BUN  --  21 27 15   CR  --  0.49* 0.68 0.54   ANIONGAP  --  5 7 9   ELY  --  8.1* 8.9 7.9*   GLC  --  128* 167* 152*     Most  Recent Urinalysis:  Recent Labs   Lab Test 05/15/19  1352   COLOR Yellow   APPEARANCE Clear   URINEGLC Negative   URINEBILI Negative   URINEKETONE 10*   SG 1.020   UBLD Moderate*   URINEPH 6.0   PROTEIN 30*   NITRITE Negative   LEUKEST Small*   RBCU 1   WBCU 6*   ,   Results for orders placed or performed during the hospital encounter of 05/15/19   CT Head w/o Contrast    Narrative    CT SCAN OF THE HEAD WITHOUT CONTRAST   5/15/2019 10:48 AM     HISTORY: Code stroke. Altered mental status, found on the floor last  night, increased confusion, difficulty walking, speech difficulty.    TECHNIQUE:  Axial images of the head and coronal reformations without  IV contrast material. Radiation dose for this scan was reduced using  automated exposure control, adjustment of the mA and/or kV according  to patient size, or iterative reconstruction technique.    COMPARISON: None.    FINDINGS:  There is generalized atrophy of the brain.  There is low  attenuation in the white matter of the cerebral hemispheres consistent  with sequelae of small vessel ischemic disease. There is no evidence  of intracranial hemorrhage, mass, acute infarct or anomaly.     The visualized portions of the sinuses and mastoids appear normal.  There is no evidence of trauma.       Impression    IMPRESSION:   1. No acute abnormality.  2. Atrophy of the brain.  White matter changes consistent with  sequelae of small vessel ischemic disease.     KYLE BRADLEY MD   CTA Head Neck with Contrast    Narrative    CT ANGIOGRAM OF THE HEAD AND NECK WITHOUT AND WITH CONTRAST  5/15/2019  10:51 AM     HISTORY: Transient ischemic attack, difficulty speaking and walking,  found on the floor, altered mental status.    TECHNIQUE:  Precontrast localizing scans were followed by CT  angiography with an injection of 70 mL Gadavist IV with scans through  the head and neck.  3D post processing was performed, images were  archived to PACS and used in interpretation of this  study.  Estimates  of carotid stenoses are made relative to the distal internal carotid  artery diameters except as noted. Radiation dose for this scan was  reduced using automated exposure control, adjustment of the mA and/or  kV according to patient size, or iterative reconstruction technique.    COMPARISON: None.    CT HEAD FINDINGS:  No contrast enhancing lesions.   Cerebral blood  flow is grossly normal.    CT ANGIOGRAM HEAD FINDINGS:  Arteries are widely patent with no  aneurysm, significant stenosis, occlusion or intraarterial thrombus.  There is fetal origin of the left posterior cerebral artery from the  internal carotid, a normal variant. There is mild calcified  atherosclerotic plaque in the left vertebral artery V4 segment without  significant stenosis. Venous circulation is unremarkable.     CT ANGIOGRAM NECK FINDINGS:   Right carotid artery: Tortuous cervical internal carotid.  No  significant stenosis.      Left carotid artery: Mild calcified plaque in the proximal internal  carotid and distal common carotid arteries. Tortuous distal cervical  internal carotid.  No significant stenosis.      Vertebral arteries:   No significant stenosis.      Other findings: Small right pleural effusion. Multiple small thyroid  nodules. In a patient without known thyroid disease, an incidental  thyroid nodule without microcalcifications measuring <1.0 cm in size  in a patient <35 years old or <1.5 cm in a patient >35 years old is  most likely benign and does not typically require follow-up.      Impression    IMPRESSION: Mild calcified plaque in the left carotid bifurcation and  left vertebral artery V4 segment. No evidence of arterial occlusion or  significant stenosis.    I called the report to Dr. Ko Samuel in the emergency room on  5/15/2019 at 11:02 AM.    KYLE BRADLEY MD   XR Shoulder Right G/E 3 Views    Narrative    RIGHT SHOULDER THREE VIEWS   5/15/2019 11:31 AM     HISTORY: Pain after  fall.    COMPARISON: None.      Impression    IMPRESSION:   1. A linear lucency within the inferior scapular body with an  associated small step-off, consistent with an acute fracture. The  entire extent of the fracture is not well-visualized on this  radiograph.  2. Mildly and moderately displaced acute fractures of the lateral  aspects of the right second through seventh ribs.  3. No other visualized acute fractures of the right shoulder region.  4. No dislocation of the right glenohumeral joint.    MICHAEL DIEZ MD   XR Chest 2 Views    Narrative    CHEST TWO VIEWS   5/15/2019 11:32 AM     HISTORY: Fall.    COMPARISON: None.    FINDINGS: A few linear opacities in the left lung base likely  represent scarring or atelectasis. Normal-sized cardiac silhouette.  Atherosclerotic calcification in the thoracic aorta. Acute fractures  of the lateral aspects of a few upper right ribs, resulting in mild  concavity of the chest wall. No visualized pneumothorax. Old fracture  of the mid left clavicle.      Impression    IMPRESSION:   1. Acute fractures of the lateral aspects of a few upper right ribs,  resulting in mild concavity of the chest wall.  2. No visualized pneumothorax.    MICHAEL DIEZ MD   XR Pelvis 1/2 Views    Narrative    PELVIS SINGLE VIEW   5/15/2019 11:31 AM     HISTORY: Fall.    COMPARISON: None.      Impression    IMPRESSION:   1. No visualized acute fracture or malalignment of the pelvis.  2. Mild degenerative changes in bilateral hip joints.    MICHAEL DIEZ MD   Chest CT w/o contrast    Narrative    CT CHEST WITHOUT CONTRAST   5/15/2019 1:49 PM     HISTORY: Multiple rib fractures and possible right scapular fracture  on right shoulder x-ray.    COMPARISON: 12/11/2006 - CT abdomen and pelvis.    TECHNIQUE: Without intravenous contrast, helical sections were  acquired through the lungs. Coronal reconstructions were generated.  Radiation dose for this scan was reduced using automated  exposure  control, adjustment of the mA and/or kV according to the patient's  size, or iterative reconstruction technique.    FINDINGS: Mildly displaced acute fractures of the posterior aspects of  the right second through sixth ribs and lateral aspects of the right  fourth through seventh ribs. Mildly to moderately displaced acute  fracture of the lateral aspects of the right third rib. Probable acute  mildly displaced fractures of the lateral aspect of the right tenth  rib. Nondisplaced acute fracture of the mid and distal aspects of the  right clavicle. Mildly displaced vertically-oriented acute fracture of  the scapular body, without involvement of the glenoid process. Partial  visualization of a probable hematoma within the soft tissues overlying  the scapular fracture (for example, series 2 image 24). Old fracture  of the mid to distal left clavicle.    Mild atelectasis in bilateral lung bases. A few very tiny less than  0.4 cm diameter nodules are present in the upper right lung. For  example, there is a 0.2 cm nodule in the lateral aspect of the right  upper lobe (series 4 image 70). Mild diffuse bronchiectasis. A very  tiny focus of gas is present in the right pleural space. A trace  amount of right pleural fluid. No left pleural or pericardial  effusion. No enlarged lymph nodes in the chest. Atherosclerotic  calcification in the thoracic aorta and coronary arteries.    Scan through the upper abdomen is unremarkable.      Impression    IMPRESSION:  1. Acute fractures of the right second through seventh ribs and the  right tenth rib. A few of the fractures are mildly and moderately  displaced. There is a very tiny focus of gas in the right pleural  space and a trace amount of right pleural fluid, but no significant  pneumothorax.  2. Nondisplaced acute vertically-oriented fracture of the scapular  body. Partial visualization of a probable hematoma within the soft  tissues overlying the scapular fracture.  3.  Nondisplaced acute fracture of the mid and distal aspects of the  right clavicle.  4. A few tiny indeterminate right lung nodules. These are very likely  benign nodules. Recommend comparison with prior imaging studies, if  available. If not available and the patient is a smoker or has other  significant risk factors, a followup CT scan could be considered in 12  months to confirm stability.    MICHAEL DIEZ MD   MR Brain w/o & w Contrast    Narrative    MRI BRAIN WITHOUT AND WITH CONTRAST  5/15/2019 11:40 PM    HISTORY:  Altered mental status, (LOC), unexplained     TECHNIQUE:  Multiplanar, multisequence MRI of the brain without and  with 5mL Gadavist    COMPARISON: CT dated 5/15/2018    FINDINGS:     Intracranial contents: There is diffuse parenchymal volume loss.   White matter changes are present in the cerebral hemispheres that are  consistent with small vessel ischemic disease in this age patient.  There appears to be a small area of restricted diffusion in the  subcortical white matter of the left frontal lobe. This could  represent a small recent lacunar type infarct. This only measures  about 6 mm in size.  No acute hemorrhage or mass effect.  There are  small susceptibility hypointensities in the left occipital region  consistent with hemosiderin deposition secondary to small chronic  microhemorrhages. The arteries at the base of the brain and the dural  venous sinuses appear patent.     Sella:  No significant abnormality accounting for technique.     Orbit: No significant abnormality accounting for technique.      Sinus/mastoids: No significant paranasal sinus mucosal disease. No  significant middle ear or mastoid effusion.    Bones/soft tissues: No aggressive osseous lesion involving the  calvarium, skull base, or visualized upper cervical spine.       Impression    IMPRESSION:    1. There appears to be a small area of restricted diffusion in the  subcortical white matter of the left frontal lobe. This  is suggestive  of a recent small, 6 mm lacunar type infarct.  2. There is diffuse parenchymal volume loss.  White matter changes are  present in the cerebral hemispheres that are consistent with small  vessel ischemic disease in this age patient..  3. No acute bleed or mass effect.    4. Small susceptibility hypointensities in the left occipital region  consistent with hemosiderin deposition secondary to small chronic  microhemorrhages. This can be seen in patients with chronic  hypertension. Cerebral amyloid could potentially have this appearance.      BARNEY BEST MD   XR Chest 2 Views    Narrative    CHEST TWO VIEWS 5/16/2019 12:02 PM     HISTORY: Rib fractures, trauma.    COMPARISON: May 15, 2019     FINDINGS: Right chest wall rib deformities similar to previous. No  pneumothorax. Left lung clear. The cardiac silhouette is not enlarged.  Pulmonary vasculature is unremarkable.      Impression    IMPRESSION: No significant change in multiple right-sided rib  fractures since the comparison study.    CAR DENNY MD       Discharge Medications   Current Discharge Medication List      START taking these medications    Details   acetaminophen (TYLENOL) 325 MG tablet Take 2 tablets (650 mg) by mouth every 4 hours as needed for mild pain    Associated Diagnoses: Closed fracture of right scapula, unspecified part of scapula, initial encounter      oxyCODONE (ROXICODONE) 5 MG tablet Take 1 tablet (5 mg) by mouth every 4 hours as needed for moderate to severe pain  Qty: 15 tablet, Refills: 0    Associated Diagnoses: Closed nondisplaced fracture of right clavicle, unspecified part of clavicle, initial encounter         CONTINUE these medications which have NOT CHANGED    Details   ALPRAZolam (XANAX PO) Take 0.25 mg by mouth nightly as needed       Donepezil HCl (ARICEPT PO) Take 10 mg by mouth At Bedtime      Levothyroxine Sodium (LEVOTHROID PO) Take 50 mcg by mouth daily       vitamin B-12 (CYANOCOBALAMIN) 1000 MCG tablet  Take 2,000 mcg by mouth daily          STOP taking these medications       atorvastatin (LIPITOR) 10 MG tablet Comments:   Reason for Stopping:             Allergies   Allergies   Allergen Reactions     Morphine

## 2019-05-19 NOTE — PLAN OF CARE
Occupational Therapy Discharge Summary    Reason for therapy discharge:    Discharged to transitional care facility.    Progress towards therapy goal(s). See goals on Care Plan in [facility] electronic health record for goal details.  Goals not met.  Barriers to achieving goals:   discharge from facility.    Therapy recommendation(s):    Continued therapy is recommended.  Rationale/Recommendations:  to maximize safety and IND in ADLs.

## 2019-05-20 ENCOUNTER — NURSING HOME VISIT (OUTPATIENT)
Dept: GERIATRICS | Facility: CLINIC | Age: 84
End: 2019-05-20
Payer: COMMERCIAL

## 2019-05-20 VITALS
DIASTOLIC BLOOD PRESSURE: 83 MMHG | BODY MASS INDEX: 23.22 KG/M2 | TEMPERATURE: 97.7 F | HEIGHT: 64 IN | HEART RATE: 95 BPM | OXYGEN SATURATION: 95 % | SYSTOLIC BLOOD PRESSURE: 136 MMHG | WEIGHT: 136 LBS | RESPIRATION RATE: 18 BRPM

## 2019-05-20 DIAGNOSIS — F41.9 ANXIETY: ICD-10-CM

## 2019-05-20 DIAGNOSIS — S42.101D CLOSED FRACTURE OF RIGHT SCAPULA WITH ROUTINE HEALING, UNSPECIFIED PART OF SCAPULA, SUBSEQUENT ENCOUNTER: ICD-10-CM

## 2019-05-20 DIAGNOSIS — S42.001D CLOSED NONDISPLACED FRACTURE OF RIGHT CLAVICLE WITH ROUTINE HEALING, UNSPECIFIED PART OF CLAVICLE, SUBSEQUENT ENCOUNTER: Primary | ICD-10-CM

## 2019-05-20 DIAGNOSIS — S22.41XD CLOSED FRACTURE OF MULTIPLE RIBS OF RIGHT SIDE WITH ROUTINE HEALING, SUBSEQUENT ENCOUNTER: ICD-10-CM

## 2019-05-20 PROCEDURE — 99310 SBSQ NF CARE HIGH MDM 45: CPT | Performed by: NURSE PRACTITIONER

## 2019-05-20 RX ORDER — ACETAMINOPHEN 325 MG/1
650 TABLET ORAL 2 TIMES DAILY
COMMUNITY

## 2019-05-20 RX ORDER — ALPRAZOLAM 0.25 MG
0.25 TABLET ORAL 2 TIMES DAILY PRN
COMMUNITY
End: 2019-05-20

## 2019-05-20 RX ORDER — LIDOCAINE 4 G/G
1 PATCH TOPICAL DAILY
COMMUNITY
End: 2023-01-11

## 2019-05-20 RX ORDER — ALPRAZOLAM 0.25 MG
0.25 TABLET ORAL 2 TIMES DAILY PRN
Qty: 30 TABLET | Refills: 1 | Status: SHIPPED | OUTPATIENT
Start: 2019-05-20 | End: 2019-06-05

## 2019-05-20 ASSESSMENT — MIFFLIN-ST. JEOR: SCORE: 1041.89

## 2019-05-20 NOTE — PROGRESS NOTES
[place] [facility]  PRIMARY CARE PROVIDER AND CLINIC:  Willow Phelan MD, MD, [facility] [address] / [facility] [address]  Chief Complaint   Patient presents with     Hospital F/U     [place] Medical Record Number:  [id number]  Place of Service where encounter took place:  [facility] ([facility]) [[id number]]    Danni Alvarez  is a 86 year old  (1/16/1933), admitted to the above facility from  [facility]. Hospital stay 5/15/19 through 5/18/19..  Admitted to this facility for  rehab, medical management and nursing care.    HPI:    HPI information obtained from: facility chart records, facility staff and patient report.   Brief Summary of Hospital Course:   Updates on Status Since Skilled nursing Admission:     Patient Danni Alvarez is 86 yr old female admitted to [facility] for rehabilitation s/p hospitalization [facility] 5/15-5/18/19 for rhabdomyolysis, minimal displaced right 2nd-7th rib fractures, right scapula & non-displaced clavicle fracture due to fall with increased confusion  PMHx depression/mild cognitive impairment, hypothyroidism and colon cancer s/p colectomy    TODAY  Patient states she does have pain in right shoulders and pain medications effective  No other complaints  Early afternoon patient tearful and want to go home. daughter states the new environment challenging for patient. She is open to trial xanax 0.25mg 2 x daily as needed  Noted PRN orders for antipsychotic medications are limited to 14 days. Face to Face encounter done today 5/20/19. Evaluation of Xanax medication indicates non-pharmacological interventions of de-stimuli environment, treat pain and provide emotional support are not effective,        Code status: full code, daughter coming to sign today. Reviewed code status with daughter Arely      CODE STATUS/ADVANCE DIRECTIVES DISCUSSION:   CPR/Full code   Patient's living condition: lives alone  ALLERGIES: Morphine  PAST  "MEDICAL HISTORY:  has a past medical history of Abnormal feces, Abnormal glucose, Anxiety state, unspecified, Hyperlipidemia, Major depressive disorder, single episode, mild (H), Malignant neoplasm of colon, unspecified site, Mild cognitive impairment, so stated, and Unspecified hypothyroidism.  PAST SURGICAL HISTORY:   has a past surgical history that includes tubal ligation; hernia repair; colectomy; cataract iol, rt/lt; and Repair ptosis bilateral (Bilateral, 1/21/2016).  FAMILY HISTORY: family history is not on file.  SOCIAL HISTORY:   reports that she has never smoked. She has never used smokeless tobacco. She reports that she drinks alcohol. She reports that she does not use drugs.    Post Discharge Medication Reconciliation Status: discharge medications reconciled and changed, per note/orders (see AVS)    Current Outpatient Medications   Medication Sig Dispense Refill     acetaminophen (TYLENOL) 325 MG tablet Take 2 tablets (650 mg) by mouth every 4 hours as needed for mild pain       ALPRAZolam (XANAX) 0.25 MG tablet Take 1 tablet (0.25 mg) by mouth nightly as needed 12 tablet 0     Donepezil HCl (ARICEPT PO) Take 10 mg by mouth At Bedtime       Levothyroxine Sodium (LEVOTHROID PO) Take 50 mcg by mouth daily        oxyCODONE (ROXICODONE) 5 MG tablet Take 1 tablet (5 mg) by mouth every 4 hours as needed for moderate to severe pain 15 tablet 0     vitamin B-12 (CYANOCOBALAMIN) 1000 MCG tablet Take 2,000 mcg by mouth daily          ROS:  10 point ROS of systems including Constitutional, Eyes, Respiratory, Cardiovascular, Gastroenterology, Genitourinary, Integumentary, Musculoskeletal, Psychiatric were all negative except for pertinent positives noted in my HPI.    Vitals:  /83   Pulse 95   Temp 97.7  F (36.5  C)   Resp 18   Ht 1.626 m (5' 4\")   Wt 61.7 kg (136 lb)   SpO2 95%   BMI 23.34 kg/m    Exam:  GENERAL APPEARANCE:  Alert, in no distress  ENT:  Mouth and posterior oropharynx normal, moist " mucous membranes  EYES:  EOM, conjunctivae, lids, pupils and irises normal  NECK:  No adenopathy,masses or thyromegaly  RESP:  respiratory effort and palpation of chest normal, lungs clear to auscultation , no respiratory distress  ABDOMEN:  normal bowel sounds, soft, nontender, no hepatosplenomegaly or other masses  M/S:   sitting in WC, sling on right ,CMS within normal limits   SKIN:  Inspection of skin and subcutaneous tissue baseline  NEURO:   Cranial nerves 2-12 are normal tested and grossly at patient's baseline  PSYCH:  memory impaired     Lab/Diagnostic data:  Labs done in SNF are in [facility]. Please refer to them using [facility]Care Everywhere.    ASSESSMENT/PLAN:  Rhabdomyolysis/Fall   Minimally displaced right 2nd-7th rib fractures  Right scapula fracture & non-displaced acute clavicle fracture   non-weight bearing RIGHT arm and sling on when up  Pain managed with as needed Tylenol & oxycodone  Plan to schedule Tylenol to better manage pain  Order Tylenol 650mg 3 x daily & daily as needed & Lidocaine patch 4% on AM & off HS (pain)  Follow up Dr. Hayes 2 wks    Statin held, follow up primary care provider regarding restart     Mild cognitive impairment  (Gradual worsening cognition over 6 months with acute worsening with recent injury)  Depression  Per hospital note:   Seen by neurology. Initial CT head with no acute abnormalities.  A CTA of the head/neck showed mild calcified plaque in the left carotid bifurcation and left vertebral artery V4 segment but no evidence of occlusion or stenosis.  In the ED the patient was evaluated by the stroke neurology team with no plans for tPa. MRI shows a small area of restricted diffusion in the subcortical white matter of the left frontal lobe. This is suggestive of a recent small, 6 mm lacunar type infarct, but there is no correlate on ADC imaging and there this would not represent an acute stroke. no further stroke work continue Aricept  Mood stable  Pending  cognitive testing   Has as needed Xanax. Plan to wean off if not using or consider alternative safer agent such as Melatonin at night   involved in safe plan home  Patient lives in condo & plans to go to assistive living     Hypothyroidism  Continue Synthroid, TSH level 1.12 on 05/12/2019, monitor      H/o Colon cancer  Status post colectomy. Thought to be curative  Patient report regular elimination      Code status: full code, daughter coming to sign today     BMP,CBC 5/22/19    Orders written by provider at facility      Total time spent with patient visit at the skilled nursing facility was 36 min including patient visit and review of past records. Greater than 50% of total time spent with counseling and coordinating care due to fall, rib fractures, scapula & clavicle fracture & cognitive impairment  Electronically signed by:  GÉNESIS Katz CNP

## 2019-05-22 ENCOUNTER — HOSPITAL LABORATORY (OUTPATIENT)
Dept: OTHER | Facility: CLINIC | Age: 84
End: 2019-05-22

## 2019-05-22 LAB
ANION GAP SERPL CALCULATED.3IONS-SCNC: 7 MMOL/L (ref 3–14)
BUN SERPL-MCNC: 13 MG/DL (ref 7–30)
CALCIUM SERPL-MCNC: 8.6 MG/DL (ref 8.5–10.1)
CHLORIDE SERPL-SCNC: 103 MMOL/L (ref 94–109)
CO2 SERPL-SCNC: 27 MMOL/L (ref 20–32)
CREAT SERPL-MCNC: 0.46 MG/DL (ref 0.52–1.04)
ERYTHROCYTE [DISTWIDTH] IN BLOOD BY AUTOMATED COUNT: 14.9 % (ref 10–15)
GFR SERPL CREATININE-BSD FRML MDRD: 90 ML/MIN/{1.73_M2}
GLUCOSE SERPL-MCNC: 120 MG/DL (ref 70–99)
HCT VFR BLD AUTO: 35.2 % (ref 35–47)
HGB BLD-MCNC: 11.7 G/DL (ref 11.7–15.7)
MCH RBC QN AUTO: 34 PG (ref 26.5–33)
MCHC RBC AUTO-ENTMCNC: 33.2 G/DL (ref 31.5–36.5)
MCV RBC AUTO: 102 FL (ref 78–100)
PLATELET # BLD AUTO: 237 10E9/L (ref 150–450)
POTASSIUM SERPL-SCNC: 3.5 MMOL/L (ref 3.4–5.3)
RBC # BLD AUTO: 3.44 10E12/L (ref 3.8–5.2)
SODIUM SERPL-SCNC: 137 MMOL/L (ref 133–144)
WBC # BLD AUTO: 5.9 10E9/L (ref 4–11)

## 2019-05-28 VITALS
BODY MASS INDEX: 22.21 KG/M2 | WEIGHT: 130.1 LBS | RESPIRATION RATE: 16 BRPM | DIASTOLIC BLOOD PRESSURE: 72 MMHG | HEART RATE: 93 BPM | SYSTOLIC BLOOD PRESSURE: 110 MMHG | OXYGEN SATURATION: 95 % | HEIGHT: 64 IN | TEMPERATURE: 98 F

## 2019-05-28 ASSESSMENT — MIFFLIN-ST. JEOR: SCORE: 1015.13

## 2019-05-28 NOTE — PROGRESS NOTES
[place] [facility]    [place] Medical Record Number:  [id number]  Place of Service where encounter took place:  [facility] ([facility]) [[id number]]  Chief Complaint   Patient presents with     Nursing Home Acute       HPI:    Danni Alvarez  is a 86 year old (1/16/1933), who is being seen today for an episodic care visit.  HPI information obtained from: facility chart records, facility staff, patient report and [facility] chart review.     Today's concern is:    Closed nondisplaced fracture of right clavicle with routine healing, unspecified part of clavicle, subsequent encounter  Closed fracture of right scapula with routine healing, unspecified part of scapula, subsequent encounter  Closed fracture of multiple ribs of right side with routine healing, subsequent encounter  Patient admitted to [facility] 5/15-5/18 due to fall resulting in rhabdomyolysis, right 2-7th rib fractures, right scapula fracture and non-displaced clavicle fracture.   Currently taking tylenol, lidocaine patch, and oxycodone for pain management. During exam, patient admits to mild pain to right shoulder. Denies SOB. Patient to follow-up with Dr. Hayes as directed.     Physical deconditioning  Cognitive impairment  PTA lives alone in a condo. Noted to have worsening cognitive impairment over the past 6 months. During hospital stay, CT head w/o acute injuries. Currently taking aricept and was recently started on xanax and melatonin for anxiety. Today, RN manager reports patient had increased anxiety over the weekend and was difficult to re-direct. However, per staff RN, patient has not exhibited these behaviors today.   Patient is actively participating in therapy sessions. Requires assistance with activity and ADLs. Ambulates w/SBA. Requires min-mod assist with toileting and ADLs. Cognitive testing done, ALINAUMS 4/30, CPT 3.7.   SW following for discharge planning. Recommending [facility] memory care.            Past  "Medical and Surgical History reviewed in Epic today.    MEDICATIONS:  Current Outpatient Medications   Medication Sig Dispense Refill     acetaminophen (TYLENOL) 325 MG tablet Take 650 mg by mouth 3 times daily & daily as needed       ALPRAZolam (XANAX) 0.25 MG tablet Take 1 tablet (0.25 mg) by mouth 2 times daily as needed for anxiety 30 tablet 1     Donepezil HCl (ARICEPT PO) Take 10 mg by mouth At Bedtime       Levothyroxine Sodium (LEVOTHROID PO) Take 50 mcg by mouth daily        Lidocaine (LIDOCARE) 4 % Patch Place 1 patch onto the skin every 12 hours       oxyCODONE (ROXICODONE) 5 MG tablet Take 1 tablet (5 mg) by mouth every 4 hours as needed for moderate to severe pain 15 tablet 0     vitamin B-12 (CYANOCOBALAMIN) 1000 MCG tablet Take 2,000 mcg by mouth daily              REVIEW OF SYSTEMS:  Limited due to cognitive impairment, but today patient verbalizes no complaints/concerns      Objective:  /72   Pulse 93   Temp 98  F (36.7  C)   Resp 16   Ht 1.626 m (5' 4\")   Wt 59 kg (130 lb 1.6 oz)   SpO2 95%   BMI 22.33 kg/m    Exam:  GENERAL APPEARANCE:  Alert, in no distress, appears healthy, oriented, cooperative  RESP:  respiratory effort and palpation of chest normal, lungs clear to auscultation , no respiratory distress  CV:  Palpation and auscultation of heart done , regular rate and rhythm, no murmur, rub, or gallop, no edema, +2 pedal pulses  ABDOMEN:  normal bowel sounds, soft, nontender, no guarding or rebound  M/S: NWB to RUE. Gait and station abnormal, unable to assess (sitting in wheelchair). Digits and nails normal  SKIN:  Inspection of skin and subcutaneous tissue baseline, Palpation of skin and subcutaneous tissue baseline  NEURO:   Cranial nerves 2-12 are normal tested and grossly at patient's baseline, Examination of sensation by touch normal  PSYCH:  Oriented x 2, insight and judgement impaired, memory impaired , cognitive testing SLUMS 4/30      Labs:   Labs done in SNF are in " [facility]. Please refer to them using EPIC/Care Everywhere., Recent labs in EPIC reviewed by me today.  and   Most Recent 3 CBC's:  Recent Labs   Lab Test 05/22/19  0752 05/15/19  1031 09/19/17  0635   WBC 5.9 8.0 3.4*   HGB 11.7 12.9 13.5   * 102* 98    181 184     Most Recent 3 BMP's:  Recent Labs   Lab Test 05/22/19  0752 05/16/19  1717 05/16/19  0707 05/15/19  1031     --  144 139   POTASSIUM 3.5 4.0 3.2* 3.7   CHLORIDE 103  --  111* 105   CO2 27  --  28 27   BUN 13  --  21 27   CR 0.46*  --  0.49* 0.68   ANIONGAP 7  --  5 7   ELY 8.6  --  8.1* 8.9   *  --  128* 167*         ASSESSMENT/PLAN:    (S42.001D) Closed nondisplaced fracture of right clavicle with routine healing, unspecified part of clavicle, subsequent encounter  (primary encounter diagnosis)  (S42.101D) Closed fracture of right scapula with routine healing, unspecified part of scapula, subsequent encounter  (S22.41XD) Closed fracture of multiple ribs of right side with routine healing, subsequent encounter  Comment: Right 2-7th rib fractures, right scapula fracture and non-displaced clavicle fracture r/t fall on 5/15.   Plan: Continue NWB to RUE. Continue pain management. Patient to follow-up with Ortho as directed.     (R53.81) Physical deconditioning  (R41.89) Cognitive impairment  Comment: Ongoing  Plan: Encourage active participation in therapy session to increase strength and promote independence in activities and ADLs. Cognitive testing to be done in therapy. SW following for discharge planning.   Continue trial of xanax PRN; will hold on starting new anti-anxiety medication d/t not exhibiting behaviors today; as well as increased fall risk with decreased safety awareness.   Speech to eval/treat speech and cognition.           Electronically signed by:  GÉNESIS Hargrove CNP

## 2019-05-28 NOTE — PROGRESS NOTES
"[place] [facility]  [place] Medical Record Number:  [id number]  Place of Service where encounter took place:  [facility]) [[id number]]  Chief Complaint   Patient presents with     Nursing Home Acute       HPI:    Danni Alvarez  is a 86 year old (1/16/1933), who is being seen today for an episodic care visit.  HPI information obtained from: {[facility] HPI:260746}. Today's concern is:  {[facility] DX:509813}    Past Medical and Surgical History reviewed in Epic today.    MEDICATIONS:  Current Outpatient Medications   Medication Sig Dispense Refill     acetaminophen (TYLENOL) 325 MG tablet Take 650 mg by mouth 3 times daily & daily as needed       ALPRAZolam (XANAX) 0.25 MG tablet Take 1 tablet (0.25 mg) by mouth 2 times daily as needed for anxiety 30 tablet 1     Donepezil HCl (ARICEPT PO) Take 10 mg by mouth At Bedtime       Levothyroxine Sodium (LEVOTHROID PO) Take 50 mcg by mouth daily        Lidocaine (LIDOCARE) 4 % Patch Place 1 patch onto the skin every 12 hours       oxyCODONE (ROXICODONE) 5 MG tablet Take 1 tablet (5 mg) by mouth every 4 hours as needed for moderate to severe pain 15 tablet 0     vitamin B-12 (CYANOCOBALAMIN) 1000 MCG tablet Take 2,000 mcg by mouth daily        ***    REVIEW OF SYSTEMS:  {[facility] FGS:196184}    Objective:  /72   Pulse 93   Temp 98  F (36.7  C)   Resp 16   Ht 1.626 m (5' 4\")   Wt 59 kg (130 lb 1.6 oz)   SpO2 95%   BMI 22.33 kg/m    Exam:  {Nursing home physical exam :534790}    Labs:   {fgslab:374661}    ASSESSMENT/PLAN:  {FGS DX:440240}    {fgsorders:939734}  ***    {[facility]GS TIME SPENT:730480}  Electronically signed by:  Mari Leal ***  {Providers Please double check the med list (in the plan section >> meds & orders tab) and Discontinue any of the meds flagged by the TC to be discontinued}        "

## 2019-05-29 ENCOUNTER — NURSING HOME VISIT (OUTPATIENT)
Dept: GERIATRICS | Facility: CLINIC | Age: 84
End: 2019-05-29
Payer: COMMERCIAL

## 2019-05-29 DIAGNOSIS — R41.89 COGNITIVE IMPAIRMENT: ICD-10-CM

## 2019-05-29 DIAGNOSIS — R53.81 PHYSICAL DECONDITIONING: ICD-10-CM

## 2019-05-29 DIAGNOSIS — S42.101D CLOSED FRACTURE OF RIGHT SCAPULA WITH ROUTINE HEALING, UNSPECIFIED PART OF SCAPULA, SUBSEQUENT ENCOUNTER: ICD-10-CM

## 2019-05-29 DIAGNOSIS — S22.41XD CLOSED FRACTURE OF MULTIPLE RIBS OF RIGHT SIDE WITH ROUTINE HEALING, SUBSEQUENT ENCOUNTER: ICD-10-CM

## 2019-05-29 DIAGNOSIS — S42.001D CLOSED NONDISPLACED FRACTURE OF RIGHT CLAVICLE WITH ROUTINE HEALING, UNSPECIFIED PART OF CLAVICLE, SUBSEQUENT ENCOUNTER: Primary | ICD-10-CM

## 2019-05-29 PROCEDURE — 99309 SBSQ NF CARE MODERATE MDM 30: CPT | Performed by: NURSE PRACTITIONER

## 2019-05-30 ENCOUNTER — TRANSFERRED RECORDS (OUTPATIENT)
Dept: HEALTH INFORMATION MANAGEMENT | Facility: CLINIC | Age: 84
End: 2019-05-30

## 2019-05-31 ENCOUNTER — TELEPHONE (OUTPATIENT)
Dept: GERIATRICS | Facility: CLINIC | Age: 84
End: 2019-05-31

## 2019-05-31 NOTE — TELEPHONE ENCOUNTER
[place] [facility] TELEPHONE ENCOUNTER    Chief Complaint   Patient presents with     cough low grade fever       Danni Alvarez is a 86 year old  (1/16/1933),Nurse called today to report: patient has Temp of 99.6 and is coughing.  She is at the TCU to rehab from a fall with rib fractures.    ASSESSMENT/PLAN  Cough with low grade fever    CXR -  ? Atelectasis VS Pneumonia    IS 10 puffs QID and QID PRN    Pulmonary Toilet    Awaiting return call    Madison Bashir, GÉNESIS CNP

## 2019-06-01 NOTE — TELEPHONE ENCOUNTER
"Follow up from orders placed earlier today by my colleague --> CXR results came back as follows: \"No radiographic evidence of acute cardiopulmonary disease, mild cardiomegaly, mild osteopenia, COPD, mild osteoarthritis\". Per RN, \"She's ok, no fever, mild dry cough\". They are awaiting incentive spirometer but encouraging deep breaths. She did have rib fractures so is recovering from that. Ok for Mucinex 600 mg every 12 hours as needed for cough.    Tori Bashir, DO   "

## 2019-06-03 ENCOUNTER — NURSING HOME VISIT (OUTPATIENT)
Dept: GERIATRICS | Facility: CLINIC | Age: 84
End: 2019-06-03
Payer: COMMERCIAL

## 2019-06-03 VITALS
HEART RATE: 100 BPM | DIASTOLIC BLOOD PRESSURE: 77 MMHG | OXYGEN SATURATION: 93 % | BODY MASS INDEX: 21.75 KG/M2 | RESPIRATION RATE: 17 BRPM | SYSTOLIC BLOOD PRESSURE: 114 MMHG | TEMPERATURE: 98.8 F | HEIGHT: 64 IN | WEIGHT: 127.4 LBS

## 2019-06-03 DIAGNOSIS — S22.41XD CLOSED FRACTURE OF MULTIPLE RIBS OF RIGHT SIDE WITH ROUTINE HEALING, SUBSEQUENT ENCOUNTER: ICD-10-CM

## 2019-06-03 DIAGNOSIS — S42.001D CLOSED NONDISPLACED FRACTURE OF RIGHT CLAVICLE WITH ROUTINE HEALING, UNSPECIFIED PART OF CLAVICLE, SUBSEQUENT ENCOUNTER: Primary | ICD-10-CM

## 2019-06-03 DIAGNOSIS — J06.9 UPPER RESPIRATORY TRACT INFECTION, UNSPECIFIED TYPE: ICD-10-CM

## 2019-06-03 PROCEDURE — 99309 SBSQ NF CARE MODERATE MDM 30: CPT | Performed by: NURSE PRACTITIONER

## 2019-06-03 RX ORDER — IPRATROPIUM BROMIDE AND ALBUTEROL SULFATE 2.5; .5 MG/3ML; MG/3ML
1 SOLUTION RESPIRATORY (INHALATION) 2 TIMES DAILY
COMMUNITY
Start: 2019-06-03 | End: 2019-06-05

## 2019-06-03 RX ORDER — AZITHROMYCIN 500 MG/1
500 TABLET, FILM COATED ORAL DAILY
COMMUNITY
Start: 2019-06-03 | End: 2019-06-09

## 2019-06-03 RX ORDER — GUAIFENESIN 600 MG/1
600 TABLET, EXTENDED RELEASE ORAL 2 TIMES DAILY
COMMUNITY
Start: 2019-06-03 | End: 2019-06-08

## 2019-06-03 ASSESSMENT — MIFFLIN-ST. JEOR: SCORE: 1002.88

## 2019-06-03 NOTE — PROGRESS NOTES
[place] [facility]  [place] Medical Record Number:  [id number]  Place of Service where encounter took place:  [facility] ([facility]) [[id number]]  Chief Complaint   Patient presents with     Nursing Home Acute       HPI:    Danni Alvarez  is a 86 year old (1/16/1933), who is being seen today for an episodic care visit.  HPI information obtained from: facility chart records, facility staff and patient report. Today's concern is:      Patient Danni Alvarez is 86 yr old female admitted to [facility] for rehabilitation s/p hospitalization [facility] 5/15-5/18/19 for rhabdomyolysis, minimal displaced right 2nd-7th rib fractures, right scapula & non-displaced clavicle fracture due to fall with increased confusion  PMHx depression/mild cognitive impairment, hypothyroidism and colon cancer s/p colectomy       Closed nondisplaced fracture of right clavicle with routine healing, unspecified part of clavicle, subsequent encounter  Closed fracture of multiple ribs of right side with routine healing, subsequent encounter  Upper respiratory tract infection, unspecified type    Patient c/o cough, runny nose  Chest xray negative for pneumonia, did show mild COPD  Not appear fluid up    Reports pain managed in right shoulder and ribs  Not using oxycodone    Past Medical and Surgical History reviewed in Epic today.    MEDICATIONS:  Current Outpatient Medications   Medication Sig Dispense Refill     acetaminophen (TYLENOL) 325 MG tablet Take 650 mg by mouth 3 times daily & daily as needed       ALPRAZolam (XANAX) 0.25 MG tablet Take 1 tablet (0.25 mg) by mouth 2 times daily as needed for anxiety 30 tablet 1     azithromycin (ZITHROMAX) 500 MG tablet Take 500 mg by mouth daily       Donepezil HCl (ARICEPT PO) Take 10 mg by mouth At Bedtime       guaiFENesin (MUCINEX) 600 MG 12 hr tablet Take 600 mg by mouth 2 times daily        ipratropium - albuterol 0.5 mg/2.5 mg/3 mL (DUONEB) 0.5-2.5 (3) MG/3ML  "neb solution Take 1 vial by nebulization 2 times daily       Levothyroxine Sodium (LEVOTHROID PO) Take 50 mcg by mouth daily        Lidocaine (LIDOCARE) 4 % Patch Place 1 patch onto the skin daily        vitamin B-12 (CYANOCOBALAMIN) 1000 MCG tablet Take 2,000 mcg by mouth daily            REVIEW OF SYSTEMS:  10 point ROS of systems including Constitutional, Eyes, Respiratory, Cardiovascular, Gastroenterology, Genitourinary, Integumentary, Musculoskeletal, Psychiatric were all negative except for pertinent positives noted in my HPI.    Objective:  /77   Pulse 100   Temp 98.8  F (37.1  C)   Resp 17   Ht 1.626 m (5' 4\")   Wt 57.8 kg (127 lb 6.4 oz)   SpO2 93%   BMI 21.87 kg/m    Exam:  GENERAL APPEARANCE:  Alert, in no distress  ENT:  Mouth and posterior oropharynx normal, moist mucous membranes, rhinorrhea  EYES:  EOM, conjunctivae, lids, pupils and irises normal  NECK:  No adenopathy,masses or thyromegaly  RESP:  respiratory effort and palpation of chest normal, lungs clear to auscultation, expiratory wheezes, coarse crackles right lower & mid lobe  CV:  Palpation and auscultation of heart done , regular rate and rhythm, no murmur, rub, or gallop  ABDOMEN:  normal bowel sounds, soft, nontender, no hepatosplenomegaly or other masses  M/S:   sitting in chair, wearing sling right arm  SKIN:  Inspection of skin and subcutaneous tissue no edema LE bilateral  NEURO:   Cranial nerves 2-12 are normal tested and grossly at patient's baseline  PSYCH:  oriented X 3    Labs:   Labs done in SNF are in [place] EPIC. Please refer to them using [facility]/Care Everywhere.    ASSESSMENT/PLAN:     Closed nondisplaced fracture of right clavicle with routine healing, unspecified part of clavicle, subsequent encounter  Closed fracture of multiple ribs of right side with routine healing, subsequent encounter  Upper respiratory tract infection, unspecified type     Concern for respiratory viral infection with COPD flare  Vitals " stable room air, monitor   Order:  duonebs 2 x daily x5 days  Azithromycin 500mg daily x 7 days for respiratory infection  Schedule Mucinex  BMP,CBC 6/5/19    Wearing sling  Need follow ortho Dr. Scott   Discontinue oxycodone-not using  Continue Tylenol  Continue therapies      Orders written by provider at facility      Electronically signed by:  GÉNESIS Katz CNP

## 2019-06-04 PROBLEM — S22.41XD CLOSED FRACTURE OF MULTIPLE RIBS OF RIGHT SIDE WITH ROUTINE HEALING, SUBSEQUENT ENCOUNTER: Status: ACTIVE | Noted: 2019-06-04

## 2019-06-04 PROBLEM — S42.101D: Status: ACTIVE | Noted: 2019-06-04

## 2019-06-04 PROBLEM — S42.001D CLOSED NONDISPLACED FRACTURE OF RIGHT CLAVICLE WITH ROUTINE HEALING, UNSPECIFIED PART OF CLAVICLE, SUBSEQUENT ENCOUNTER: Status: ACTIVE | Noted: 2019-06-04

## 2019-06-04 PROBLEM — R53.81 PHYSICAL DECONDITIONING: Status: ACTIVE | Noted: 2019-06-04

## 2019-06-05 ENCOUNTER — DISCHARGE SUMMARY NURSING HOME (OUTPATIENT)
Dept: GERIATRICS | Facility: CLINIC | Age: 84
End: 2019-06-05
Payer: COMMERCIAL

## 2019-06-05 ENCOUNTER — HOSPITAL LABORATORY (OUTPATIENT)
Dept: OTHER | Facility: CLINIC | Age: 84
End: 2019-06-05

## 2019-06-05 VITALS
BODY MASS INDEX: 20.78 KG/M2 | WEIGHT: 121.7 LBS | TEMPERATURE: 97.9 F | HEART RATE: 86 BPM | RESPIRATION RATE: 18 BRPM | SYSTOLIC BLOOD PRESSURE: 128 MMHG | OXYGEN SATURATION: 97 % | HEIGHT: 64 IN | DIASTOLIC BLOOD PRESSURE: 83 MMHG

## 2019-06-05 DIAGNOSIS — S22.41XD CLOSED FRACTURE OF MULTIPLE RIBS OF RIGHT SIDE WITH ROUTINE HEALING, SUBSEQUENT ENCOUNTER: ICD-10-CM

## 2019-06-05 DIAGNOSIS — S42.001D CLOSED NONDISPLACED FRACTURE OF RIGHT CLAVICLE WITH ROUTINE HEALING, UNSPECIFIED PART OF CLAVICLE, SUBSEQUENT ENCOUNTER: Primary | ICD-10-CM

## 2019-06-05 DIAGNOSIS — F41.9 ANXIETY: ICD-10-CM

## 2019-06-05 DIAGNOSIS — S42.101D CLOSED FRACTURE OF RIGHT SCAPULA WITH ROUTINE HEALING, UNSPECIFIED PART OF SCAPULA, SUBSEQUENT ENCOUNTER: ICD-10-CM

## 2019-06-05 DIAGNOSIS — R41.89 COGNITIVE IMPAIRMENT: ICD-10-CM

## 2019-06-05 DIAGNOSIS — R53.81 PHYSICAL DECONDITIONING: ICD-10-CM

## 2019-06-05 LAB
ANION GAP SERPL CALCULATED.3IONS-SCNC: 6 MMOL/L (ref 3–14)
BUN SERPL-MCNC: 14 MG/DL (ref 7–30)
CALCIUM SERPL-MCNC: 9.2 MG/DL (ref 8.5–10.1)
CHLORIDE SERPL-SCNC: 104 MMOL/L (ref 94–109)
CO2 SERPL-SCNC: 27 MMOL/L (ref 20–32)
CREAT SERPL-MCNC: 0.53 MG/DL (ref 0.52–1.04)
ERYTHROCYTE [DISTWIDTH] IN BLOOD BY AUTOMATED COUNT: 14.1 % (ref 10–15)
GFR SERPL CREATININE-BSD FRML MDRD: 86 ML/MIN/{1.73_M2}
GLUCOSE SERPL-MCNC: 125 MG/DL (ref 70–99)
HCT VFR BLD AUTO: 40.4 % (ref 35–47)
HGB BLD-MCNC: 13.3 G/DL (ref 11.7–15.7)
MCH RBC QN AUTO: 33.5 PG (ref 26.5–33)
MCHC RBC AUTO-ENTMCNC: 32.9 G/DL (ref 31.5–36.5)
MCV RBC AUTO: 102 FL (ref 78–100)
PLATELET # BLD AUTO: 382 10E9/L (ref 150–450)
POTASSIUM SERPL-SCNC: 4.1 MMOL/L (ref 3.4–5.3)
RBC # BLD AUTO: 3.97 10E12/L (ref 3.8–5.2)
SODIUM SERPL-SCNC: 137 MMOL/L (ref 133–144)
WBC # BLD AUTO: 7.1 10E9/L (ref 4–11)

## 2019-06-05 PROCEDURE — 99316 NF DSCHRG MGMT 30 MIN+: CPT | Performed by: NURSE PRACTITIONER

## 2019-06-05 RX ORDER — ALPRAZOLAM 0.25 MG
0.25 TABLET ORAL
Qty: 30 TABLET | Refills: 0 | Status: SHIPPED | OUTPATIENT
Start: 2019-06-05 | End: 2023-01-11

## 2019-06-05 RX ORDER — ALPRAZOLAM 0.25 MG
0.25 TABLET ORAL
COMMUNITY
End: 2023-01-11

## 2019-06-05 ASSESSMENT — MIFFLIN-ST. JEOR: SCORE: 977.03

## 2019-06-05 NOTE — PROGRESS NOTES
Documentation of Face to Face and Certification for Home Health Services    I certify that patient: Danni Alvarez is under my care and that I, or a nurse practitioner or physician's assistant working with me, had a face-to-face encounter that meets the physician face-to-face encounter requirements with this patient on: 6/5/2019.    This encounter with the patient was in whole, or in part, for the following medical condition, which is the primary reason for home health care: right rib fractures, right scapula & clavicle fracture.    I certify that, based on my findings, the following services are medically necessary home health services: Nursing, Occupational Therapy, Physical Therapy and HHA.    My clinical findings support the need for the above services because: Nurse is needed: To assess pain, vitals, respiratory infection after changes in medications or other medical regimen.., Occupational Therapy Services are needed to assess and treat cognitive ability and address ADL safety due to impairment in weakness., Physical Therapy Services are needed to assess and treat the following functional impairments: weakness. and HHA    Further, I certify that my clinical findings support that this patient is homebound (i.e. absences from home require considerable and taxing effort and are for medical reasons or [employer] services or infrequently or of short duration when for other reasons) because: Requires assistance of another person or specialized equipment to access medical services because patient: Is unable to walk greater than 400 feet without rest...    Based on the above findings. I certify that this patient is confined to the home and needs intermittent skilled nursing care, physical therapy and/or speech therapy.  The patient is under my care, and I have initiated the establishment of the plan of care.  This patient will be followed by a physician who will periodically review the plan of  care.  Physician/Provider to provide follow up care: Willow Phelan    Attending hospital physician (the Medicare certified [facility] provider): Jill Rice MD, signing F2F and only signing for initial order. Please send all follow up questions and concerns or needed follow up signatures to the PCP, who [facility] has on file as:  Willow Phelan.    Physician Signature: See electronic signature associated with these discharge orders.  Date: 6/5/2019

## 2019-06-05 NOTE — LETTER
6/5/2019        RE: Danni Alvarez  [address]  [facility] [address]        [place] [facility] DISCHARGE MEDICATIONS  PATIENT'S NAME: Danni Alvarez  YOB: 1933  MEDICAL RECORD NUMBER:  [id number]  Place of Service where encounter took place:  [facility] ([facility]) [[id number]]      Discharge Medications:  Current Outpatient Medications   Medication Sig Dispense Refill     acetaminophen (TYLENOL) 325 MG tablet Take 650 mg by mouth every 4 hours as needed        ALPRAZolam (XANAX) 0.25 MG tablet Take 0.25 mg by mouth nightly as needed for anxiety       azithromycin (ZITHROMAX) 500 MG tablet Take 500 mg by mouth daily  STOP DATE 6/9/19       Donepezil HCl (ARICEPT PO) Take 10 mg by mouth At Bedtime       guaiFENesin (MUCINEX) 600 MG 12 hr tablet Take 600 mg by mouth 2 times daily   STOP DATE 6/8/19       Levothyroxine Sodium (LEVOTHROID PO) Take 50 mcg by mouth daily        Lidocaine (LIDOCARE) 4 % Patch Place 1 patch onto the skin daily        vitamin B-12 (CYANOCOBALAMIN) 1000 MCG tablet Take 2,000 mcg by mouth daily            Electronically signed by:  GÉNESIS Katz CNP         Documentation of Face to Face and Certification for Home Health Services    I certify that patient: Danni Alvarez is under my care and that I, or a nurse practitioner or physician's assistant working with me, had a face-to-face encounter that meets the physician face-to-face encounter requirements with this patient on: 6/5/2019.    This encounter with the patient was in whole, or in part, for the following medical condition, which is the primary reason for home health care: right rib fractures, right scapula & clavicle fracture.    I certify that, based on my findings, the following services are medically necessary home health services: Nursing, Occupational Therapy, Physical Therapy and HHA.    My clinical findings support the need for the above services because:  Nurse is needed: To assess pain, vitals, respiratory infection after changes in medications or other medical regimen.., Occupational Therapy Services are needed to assess and treat cognitive ability and address ADL safety due to impairment in weakness., Physical Therapy Services are needed to assess and treat the following functional impairments: weakness. and HHA    Further, I certify that my clinical findings support that this patient is homebound (i.e. absences from home require considerable and taxing effort and are for medical reasons or [facility] services or infrequently or of short duration when for other reasons) because: Requires assistance of another person or specialized equipment to access medical services because patient: Is unable to walk greater than 400 feet without rest...    Based on the above findings. I certify that this patient is confined to the home and needs intermittent skilled nursing care, physical therapy and/or speech therapy.  The patient is under my care, and I have initiated the establishment of the plan of care.  This patient will be followed by a physician who will periodically review the plan of care.  Physician/Provider to provide follow up care: Willow Phelan    Attending hospital physician (the Medicare certified [place] provider): Jill Rice MD, signing F2F and only signing for initial order. Please send all follow up questions and concerns or needed follow up signatures to the PCP, who [place] has on file as:  Willow Phelan.    Physician Signature: See electronic signature associated with these discharge orders.  Date: 6/5/2019      [place] [facility] DISCHARGE SUMMARY  PATIENT'S NAME: Danni Alvarez  YOB: 1933  MEDICAL RECORD NUMBER:  [id number]  Place of Service where encounter took place:  [facility] ([facility]) [[id number]]    PRIMARY CARE PROVIDER AND CLINIC RESPONSIBLE AFTER TRANSFER:   Willow WYATT  MD [place], MD, [facility] hospital [address] / [facility] [address]    Non-FMG Provider     Transferring providers: Jill Chand, Dwayne CAPPS CNP, MD  Recent Hospitalization/ED:  [facility] Hospital stay 5/15/19 to 5/18/19.  Date of SNF Admission: May / 18 / 2019  Date of SNF (anticipated) Discharge: June / 06 / 2019  Discharged to: [facility]  Cognitive Scores/Physical Function/DME:   Resident plans to d/c to [facility] 6/7.   OT   SLUMS: 4/30   CPT: 3.7  Stand by assist TRANSFERS      CODE STATUS/ADVANCE DIRECTIVES DISCUSSION:  Full Code   ALLERGIES: Morphine    DISCHARGE DIAGNOSIS/NURSING FACILITY COURSE:       Patient Danni Alvarez is 86 yr old female admitted to [facility] for rehabilitation s/p hospitalization [facility] 5/15-5/18/19 for rhabdomyolysis, minimal displaced right 2nd-7th rib fractures, right scapula & non-displaced clavicle fracture due to fall with increased confusion  PMHx depression/mild cognitive impairment, hypothyroidism and colon cancer s/p colectomy    Rhabdomyolysis/Fall   Minimally displaced right 2nd-7th rib fractures  Right scapula fracture & non-displaced acute clavicle fracture   non-weight bearing RIGHT arm and sling on when up  Pain managed with as needed Tylenol & lidocaine  Follow up Dr. Hayes ASAP    Statin held, follow up primary care provider regarding restart     Mild cognitive impairment  (Gradual worsening cognition over 6 months with acute worsening with recent injury)  Depression  Per hospital note:   Seen by neurology. Initial CT head with no acute abnormalities.  A CTA of the head/neck showed mild calcified plaque in the left carotid bifurcation and left vertebral artery V4 segment but no evidence of occlusion or stenosis.  In the ED the patient was evaluated by the stroke neurology team with no plans for tPa. MRI shows a small area of restricted diffusion in the subcortical white matter of the left frontal  lobe. This is suggestive of a recent small, 6 mm lacunar type infarct, but there is no correlate on ADC imaging and there this would not represent an acute stroke. no further stroke work continue Aricept  Mood stable  Has as needed Xanax. Plan to wean off if not using or consider alternative safer agent such as Melatonin at night  Moving to [facility] assistive living     Hypothyroidism  Continue Synthroid, TSH level 1.12 on 05/12/2019, monitor      H/o Colon cancer  Status post colectomy. Thought to be curative  Patient report regular elimination    Respiratory infection  Likely viral. Chest xray show mild COPD & no signs and symptoms pneumonia. Patient noted to have wheezing and coarse crackles.    Wheezing and crackles resolving  Continue azithromycin course, monitor   Signs and symptoms improving      Past Medical History:  has a past medical history of Abnormal feces, Abnormal glucose, Anxiety state, unspecified, Hyperlipidemia, Major depressive disorder, single episode, mild (H), Malignant neoplasm of colon, unspecified site, Mild cognitive impairment, so stated, and Unspecified hypothyroidism.    Discharge Medications:  Current Outpatient Medications   Medication Sig Dispense Refill     acetaminophen (TYLENOL) 325 MG tablet Take 650 mg by mouth every 4 hours as needed        ALPRAZolam (XANAX) 0.25 MG tablet Take 0.25 mg by mouth nightly as needed for anxiety       azithromycin (ZITHROMAX) 500 MG tablet Take 500 mg by mouth daily  STOP DATE 6/9/19       Donepezil HCl (ARICEPT PO) Take 10 mg by mouth At Bedtime       guaiFENesin (MUCINEX) 600 MG 12 hr tablet Take 600 mg by mouth 2 times daily   STOP DATE 6/8/19       Levothyroxine Sodium (LEVOTHROID PO) Take 50 mcg by mouth daily        Lidocaine (LIDOCARE) 4 % Patch Place 1 patch onto the skin daily        vitamin B-12 (CYANOCOBALAMIN) 1000 MCG tablet Take 2,000 mcg by mouth daily           Medication Changes/Rationale:     Started on azithromycin for  "respiratory infection    Scheduled Tylenol and will change to as needed on discharge     Lidocaine patch for pain    duonebs started and will discontinue on discharge for respiratory infection    Xanax increased 0.25mg 2 x daily as needed due to anxiety and will reduce to prior to hospital admission once daily as needed HS on discharge     Controlled medications sent with patient:   no more than 2 wks supply     ROS:   10 point ROS of systems including Constitutional, Eyes, Respiratory, Cardiovascular, Gastroenterology, Genitourinary, Integumentary, Musculoskeletal, Psychiatric were all negative except for pertinent positives noted in my HPI.    Physical Exam:   Vitals: /83   Pulse 86   Temp 97.9  F (36.6  C)   Resp 18   Ht 1.626 m (5' 4\")   Wt 55.2 kg (121 lb 11.2 oz)   SpO2 97%   BMI 20.89 kg/m     BMI= Body mass index is 20.89 kg/m .  GENERAL APPEARANCE:  Alert  ENT:  Mouth and posterior oropharynx normal, moist mucous membranes  EYES:  EOM, conjunctivae, lids, pupils and irises normal  NECK:  No adenopathy,masses or thyromegaly  RESP:  respiratory effort and palpation of chest normal, lungs clear to auscultation, no respiratory distress, phlegm in throat that improve with cough  CV:  Palpation and auscultation of heart done , regular rate and rhythm, no murmur, rub, or gallop  ABDOMEN:  normal bowel sounds, soft, nontender, no hepatosplenomegaly or other masses  M/S:   Gait and station normal  SKIN:  Inspection of skin and subcutaneous tissue right arm in sling   NEURO:   Cranial nerves 2-12 are normal tested and grossly at patient's baseline  PSYCH:  oriented X 3     SNF labs: Labs done in SNF are in [place] EPIC. Please refer to them using EPIC/Care Everywhere.      DISCHARGE PLAN:    Medical Follow Up:      Follow up with primary care provider in 1-2 weeks  [facility] to set up a appointment prior to discharge     MTM referral needed and placed by " this provider: Gwen    Current [place] scheduled appointments:       Discharge Services: Home Care:  Occupational Therapy, Physical Therapy, Registered Nurse and Home Health Aide    Discharge Instructions Verbalized to Patient at Discharge:     Weight bearing restrictions:  Non-weight bearing RIGHT ARM IN SLING WHEN UP      TOTAL DISCHARGE TIME:   Greater than 30 minutes  Electronically signed by:  GÉNESIS Katz CNP     Home care Face to Face documentation done in EPIC [facility]                     Sincerely,        GÉNESIS Katz CNP

## 2019-06-05 NOTE — PROGRESS NOTES
[place] [facility] DISCHARGE MEDICATIONS  PATIENT'S NAME: Danni Alvarez  YOB: 1933  MEDICAL RECORD NUMBER:  [id number]  Place of Service where encounter took place:  [facility] [[id number]]      Discharge Medications:  Current Outpatient Medications   Medication Sig Dispense Refill     acetaminophen (TYLENOL) 325 MG tablet Take 650 mg by mouth every 4 hours as needed        ALPRAZolam (XANAX) 0.25 MG tablet Take 0.25 mg by mouth nightly as needed for anxiety       azithromycin (ZITHROMAX) 500 MG tablet Take 500 mg by mouth daily  STOP DATE 6/9/19       Donepezil HCl (ARICEPT PO) Take 10 mg by mouth At Bedtime       guaiFENesin (MUCINEX) 600 MG 12 hr tablet Take 600 mg by mouth 2 times daily   STOP DATE 6/8/19       Levothyroxine Sodium (LEVOTHROID PO) Take 50 mcg by mouth daily        Lidocaine (LIDOCARE) 4 % Patch Place 1 patch onto the skin daily        vitamin B-12 (CYANOCOBALAMIN) 1000 MCG tablet Take 2,000 mcg by mouth daily            Electronically signed by:  GÉNESIS Katz CNP

## 2019-06-05 NOTE — PROGRESS NOTES
[place] [facility] DISCHARGE SUMMARY  PATIENT'S NAME: Danni Alvarez  YOB: 1933  MEDICAL RECORD NUMBER:  [id number]  Place of Service where encounter took place:  [facility] ([facility]) [[id number]]    PRIMARY CARE PROVIDER AND CLINIC RESPONSIBLE AFTER TRANSFER:   Willow Phelan MD, MD, [facility] [address] / [facility] [address]    Non-FMG Provider     Transferring providers: Jlil Chand, GÉNESIS DELONG, Dwayne YIN  Recent Hospitalization/ED:  [facility] stay 5/15/19 to 5/18/19.  Date of SNF Admission: May / 18 / 2019  Date of SNF (anticipated) Discharge: June / 06 / 2019  Discharged to: [facility]  Cognitive Scores/Physical Function/DME:   Resident plans to d/c to [facility] 6/7.   OT   SLUMS: 4/30   CPT: 3.7  Stand by assist TRANSFERS      CODE STATUS/ADVANCE DIRECTIVES DISCUSSION:  Full Code   ALLERGIES: Morphine    DISCHARGE DIAGNOSIS/NURSING FACILITY COURSE:       Patient Danni Alvarez is 86 yr old female admitted to [facility] for rehabilitation s/p hospitalization [facility] 5/15-5/18/19 for rhabdomyolysis, minimal displaced right 2nd-7th rib fractures, right scapula & non-displaced clavicle fracture due to fall with increased confusion  PMHx depression/mild cognitive impairment, hypothyroidism and colon cancer s/p colectomy    Rhabdomyolysis/Fall   Minimally displaced right 2nd-7th rib fractures  Right scapula fracture & non-displaced acute clavicle fracture   non-weight bearing RIGHT arm and sling on when up  Pain managed with as needed Tylenol & lidocaine  Follow up Dr. Hayes ASAP    Statin held, follow up primary care provider regarding restart     Mild cognitive impairment  (Gradual worsening cognition over 6 months with acute worsening with recent injury)  Depression  Per hospital note:   Seen by neurology. Initial CT head with no acute abnormalities.  A CTA of the head/neck showed mild  calcified plaque in the left carotid bifurcation and left vertebral artery V4 segment but no evidence of occlusion or stenosis.  In the ED the patient was evaluated by the stroke neurology team with no plans for tPa. MRI shows a small area of restricted diffusion in the subcortical white matter of the left frontal lobe. This is suggestive of a recent small, 6 mm lacunar type infarct, but there is no correlate on ADC imaging and there this would not represent an acute stroke. no further stroke work continue Aricept  Mood stable  Has as needed Xanax. Plan to wean off if not using or consider alternative safer agent such as Melatonin at night  Moving to [facility] assistive living     Hypothyroidism  Continue Synthroid, TSH level 1.12 on 05/12/2019, monitor      H/o Colon cancer  Status post colectomy. Thought to be curative  Patient report regular elimination    Respiratory infection  Likely viral. Chest xray show mild COPD & no signs and symptoms pneumonia. Patient noted to have wheezing and coarse crackles.    Wheezing and crackles resolving  Continue azithromycin course, monitor   Signs and symptoms improving      Past Medical History:  has a past medical history of Abnormal feces, Abnormal glucose, Anxiety state, unspecified, Hyperlipidemia, Major depressive disorder, single episode, mild (H), Malignant neoplasm of colon, unspecified site, Mild cognitive impairment, so stated, and Unspecified hypothyroidism.    Discharge Medications:  Current Outpatient Medications   Medication Sig Dispense Refill     acetaminophen (TYLENOL) 325 MG tablet Take 650 mg by mouth every 4 hours as needed        ALPRAZolam (XANAX) 0.25 MG tablet Take 0.25 mg by mouth nightly as needed for anxiety       azithromycin (ZITHROMAX) 500 MG tablet Take 500 mg by mouth daily  STOP DATE 6/9/19       Donepezil HCl (ARICEPT PO) Take 10 mg by mouth At Bedtime       guaiFENesin (MUCINEX) 600 MG 12 hr tablet Take 600 mg by mouth 2 times daily   STOP  "DATE 6/8/19       Levothyroxine Sodium (LEVOTHROID PO) Take 50 mcg by mouth daily        Lidocaine (LIDOCARE) 4 % Patch Place 1 patch onto the skin daily        vitamin B-12 (CYANOCOBALAMIN) 1000 MCG tablet Take 2,000 mcg by mouth daily           Medication Changes/Rationale:     Started on azithromycin for respiratory infection    Scheduled Tylenol and will change to as needed on discharge     Lidocaine patch for pain    duonebs started and will discontinue on discharge for respiratory infection    Xanax increased 0.25mg 2 x daily as needed due to anxiety and will reduce to prior to hospital admission once daily as needed HS on discharge     Controlled medications sent with patient:   no more than 2 wks supply     ROS:   10 point ROS of systems including Constitutional, Eyes, Respiratory, Cardiovascular, Gastroenterology, Genitourinary, Integumentary, Musculoskeletal, Psychiatric were all negative except for pertinent positives noted in my HPI.    Physical Exam:   Vitals: /83   Pulse 86   Temp 97.9  F (36.6  C)   Resp 18   Ht 1.626 m (5' 4\")   Wt 55.2 kg (121 lb 11.2 oz)   SpO2 97%   BMI 20.89 kg/m    BMI= Body mass index is 20.89 kg/m .  GENERAL APPEARANCE:  Alert  ENT:  Mouth and posterior oropharynx normal, moist mucous membranes  EYES:  EOM, conjunctivae, lids, pupils and irises normal  NECK:  No adenopathy,masses or thyromegaly  RESP:  respiratory effort and palpation of chest normal, lungs clear to auscultation, no respiratory distress, phlegm in throat that improve with cough  CV:  Palpation and auscultation of heart done , regular rate and rhythm, no murmur, rub, or gallop  ABDOMEN:  normal bowel sounds, soft, nontender, no hepatosplenomegaly or other masses  M/S:   Gait and station normal  SKIN:  Inspection of skin and subcutaneous tissue right arm in sling   NEURO:   Cranial nerves 2-12 are normal tested and grossly at patient's baseline  PSYCH:  oriented X 3     SNF labs: Labs done in SNF " are in [facility]. Please refer to them using [employer]/Care Everywhere.      DISCHARGE PLAN:    Medical Follow Up:      Follow up with primary care provider in 1-2 weeks  Follow up [facility]ozia [facility] Coordinator to set up a appointment prior to discharge     MTM referral needed and placed by this provider: No    Current [place] scheduled appointments:       Discharge Services: Home Care:  Occupational Therapy, Physical Therapy, Registered Nurse and Home Health Aide    Discharge Instructions Verbalized to Patient at Discharge:     Weight bearing restrictions:  Non-weight bearing RIGHT ARM IN SLING WHEN UP      TOTAL DISCHARGE TIME:   Greater than 30 minutes  Electronically signed by:  GÉNESIS Katz CNP     Home care Face to Face documentation done in [facility] attached to Home care orders for [facility] homecare.

## 2019-06-06 ENCOUNTER — HOSPITAL LABORATORY (OUTPATIENT)
Dept: OTHER | Facility: CLINIC | Age: 84
End: 2019-06-06

## 2019-06-06 LAB
L PNEUMO1 AG UR QL IA: NORMAL
SPECIMEN SOURCE: NORMAL

## 2019-06-07 LAB
L PNEUMO1 AG UR QL IA: NORMAL
SPECIMEN SOURCE: NORMAL

## 2019-11-11 ENCOUNTER — RECORDS - HEALTHEAST (OUTPATIENT)
Dept: LAB | Facility: CLINIC | Age: 84
End: 2019-11-11

## 2019-11-11 LAB
ALBUMIN UR-MCNC: NEGATIVE MG/DL
APPEARANCE UR: CLEAR
BACTERIA #/AREA URNS HPF: ABNORMAL HPF
BILIRUB UR QL STRIP: NEGATIVE
CAOX CRY #/AREA URNS HPF: PRESENT /[HPF]
COLOR UR AUTO: YELLOW
GLUCOSE UR STRIP-MCNC: NEGATIVE MG/DL
HGB UR QL STRIP: NEGATIVE
KETONES UR STRIP-MCNC: NEGATIVE MG/DL
LEUKOCYTE ESTERASE UR QL STRIP: ABNORMAL
MUCOUS THREADS #/AREA URNS LPF: ABNORMAL LPF
NITRATE UR QL: NEGATIVE
PH UR STRIP: 6 [PH] (ref 4.5–8)
RBC #/AREA URNS AUTO: ABNORMAL HPF
SP GR UR STRIP: 1.01 (ref 1–1.03)
SQUAMOUS #/AREA URNS AUTO: ABNORMAL LPF
UROBILINOGEN UR STRIP-ACNC: ABNORMAL
WBC #/AREA URNS AUTO: ABNORMAL HPF

## 2019-11-13 LAB — BACTERIA SPEC CULT: NORMAL

## 2020-03-20 NOTE — TELEPHONE ENCOUNTER
At 1330 pt returned RN call.  Denies UTI symptoms.      Rx for bactrim DS sent to [facility].    Please Contact your PCP clinic or return to the Emergency department if your:      Symptoms worsen or other concerning symptom's.     Emily Khalil, RN  [facility] RN  Lung Nodule and ED Lab Result F/u RN  Epic pool (ED late result f/u RN): P [id number]  FV INCIDENTAL RADIOLOGY F/U NURSES: P [id number]  # [phone]       SENT SCRIPT TO DARYL

## 2020-03-27 ENCOUNTER — RECORDS - HEALTHEAST (OUTPATIENT)
Dept: LAB | Facility: CLINIC | Age: 85
End: 2020-03-27

## 2020-03-27 LAB
ANION GAP SERPL CALCULATED.3IONS-SCNC: 10 MMOL/L (ref 5–18)
BUN SERPL-MCNC: 15 MG/DL (ref 8–28)
CALCIUM SERPL-MCNC: 9.2 MG/DL (ref 8.5–10.5)
CHLORIDE BLD-SCNC: 101 MMOL/L (ref 98–107)
CO2 SERPL-SCNC: 25 MMOL/L (ref 22–31)
CREAT SERPL-MCNC: 0.67 MG/DL (ref 0.6–1.1)
ERYTHROCYTE [DISTWIDTH] IN BLOOD BY AUTOMATED COUNT: 13.8 % (ref 11–14.5)
GFR SERPL CREATININE-BSD FRML MDRD: >60 ML/MIN/1.73M2
GLUCOSE BLD-MCNC: 116 MG/DL (ref 70–125)
HCT VFR BLD AUTO: 43.8 % (ref 35–47)
HGB BLD-MCNC: 14.4 G/DL (ref 12–16)
MCH RBC QN AUTO: 32.5 PG (ref 27–34)
MCHC RBC AUTO-ENTMCNC: 32.9 G/DL (ref 32–36)
MCV RBC AUTO: 99 FL (ref 80–100)
PLATELET # BLD AUTO: 182 THOU/UL (ref 140–440)
PMV BLD AUTO: 10.6 FL (ref 8.5–12.5)
POTASSIUM BLD-SCNC: 4.5 MMOL/L (ref 3.5–5)
RBC # BLD AUTO: 4.43 MILL/UL (ref 3.8–5.4)
SODIUM SERPL-SCNC: 136 MMOL/L (ref 136–145)
TSH SERPL DL<=0.005 MIU/L-ACNC: 1.3 UIU/ML (ref 0.3–5)
WBC: 6.2 THOU/UL (ref 4–11)

## 2020-07-03 ENCOUNTER — RECORDS - HEALTHEAST (OUTPATIENT)
Dept: LAB | Facility: CLINIC | Age: 85
End: 2020-07-03

## 2020-07-11 ENCOUNTER — COMMUNICATION - HEALTHEAST (OUTPATIENT)
Dept: SCHEDULING | Facility: CLINIC | Age: 85
End: 2020-07-11

## 2020-07-11 LAB
SARS-COV-2 BY NAA - HISTORICAL: NOT DETECTED
SARS-COV-2 SOURCE - HISTORICAL: NORMAL

## 2021-01-03 ENCOUNTER — HOSPITAL ENCOUNTER (EMERGENCY)
Facility: CLINIC | Age: 86
Discharge: HOME | End: 2021-01-03
Attending: EMERGENCY MEDICINE | Admitting: EMERGENCY MEDICINE
Payer: COMMERCIAL

## 2021-01-03 VITALS
HEART RATE: 87 BPM | SYSTOLIC BLOOD PRESSURE: 156 MMHG | OXYGEN SATURATION: 92 % | TEMPERATURE: 97.6 F | DIASTOLIC BLOOD PRESSURE: 95 MMHG | RESPIRATION RATE: 16 BRPM

## 2021-01-03 DIAGNOSIS — S51.811A LACERATION OF RIGHT FOREARM, INITIAL ENCOUNTER: ICD-10-CM

## 2021-01-03 PROCEDURE — 250N000011 HC RX IP 250 OP 636: Performed by: EMERGENCY MEDICINE

## 2021-01-03 PROCEDURE — 12004 RPR S/N/AX/GEN/TRK7.6-12.5CM: CPT

## 2021-01-03 PROCEDURE — 99283 EMERGENCY DEPT VISIT LOW MDM: CPT | Mod: 25

## 2021-01-03 PROCEDURE — 90471 IMMUNIZATION ADMIN: CPT | Performed by: EMERGENCY MEDICINE

## 2021-01-03 PROCEDURE — 90715 TDAP VACCINE 7 YRS/> IM: CPT | Performed by: EMERGENCY MEDICINE

## 2021-01-03 RX ADMIN — CLOSTRIDIUM TETANI TOXOID ANTIGEN (FORMALDEHYDE INACTIVATED), CORYNEBACTERIUM DIPHTHERIAE TOXOID ANTIGEN (FORMALDEHYDE INACTIVATED), BORDETELLA PERTUSSIS TOXOID ANTIGEN (GLUTARALDEHYDE INACTIVATED), BORDETELLA PERTUSSIS FILAMENTOUS HEMAGGLUTININ ANTIGEN (FORMALDEHYDE INACTIVATED), BORDETELLA PERTUSSIS PERTACTIN ANTIGEN, AND BORDETELLA PERTUSSIS FIMBRIAE 2/3 ANTIGEN 0.5 ML: 5; 2; 2.5; 5; 3; 5 INJECTION, SUSPENSION INTRAMUSCULAR at 18:54

## 2021-01-03 ASSESSMENT — ENCOUNTER SYMPTOMS: WOUND: 1
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
## 2021-01-03 NOTE — ED AVS SNAPSHOT
[facility] Emergency Dept  [address] [facility] [address]  Phone: [phone]  Fax: [phone]                                    Danni Alvarez   MRN: [id number]    Department: [facility] Emergency Dept   Date of Visit: 1/3/2021           After Visit Summary Signature Page    I have received my discharge instructions, and my questions have been answered. I have discussed any challenges I see with this plan with the nurse or doctor.    ..........................................................................................................................................  Patient/Patient Representative Signature      ..........................................................................................................................................  Patient Representative Print Name and Relationship to Patient    ..................................................               ................................................  Date                                   Time    ..........................................................................................................................................  Reviewed by Signature/Title    ...................................................              ..............................................  Date                                               Time          22EPIC Rev 08/18
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
## 2021-01-04 NOTE — DISCHARGE INSTRUCTIONS
18 sutures placed  Should be removed in 7-10 days  Keep dressing on till tomorrow then can change unless starts bleeding through dressing. If starts bleeding, apply direct pressure on wound and then this should stop bleeding  Daily dressing changes  Apply vaseline and non stick dressing on arm daily  Watch for signs of infection such as fever, spreading redness, pus drainage

## 2021-01-04 NOTE — ED NOTES
Called pt's TRISTAN brunner and updated her on pt's discharge. Stephanie, the pt's daughter, stated she will come pick the pt up. She had no other questions at this time. I attempted to call [facility], the pt's nursing home, but was unable to get a hold of staff. I attempted to call x 5. I also attempted to call the nurses primary number that was provided by Stephanie and still was unable to get a hold of staff at this time.

## 2021-02-01 ENCOUNTER — RECORDS - HEALTHEAST (OUTPATIENT)
Dept: LAB | Facility: CLINIC | Age: 86
End: 2021-02-01

## 2021-02-01 LAB
SARS-COV-2 PCR COMMENT: NORMAL
SARS-COV-2 RNA SPEC QL NAA+PROBE: NEGATIVE
SARS-COV-2 VIRUS SPECIMEN SOURCE: NORMAL

## 2021-02-25 ENCOUNTER — RECORDS - HEALTHEAST (OUTPATIENT)
Dept: LAB | Facility: CLINIC | Age: 86
End: 2021-02-25

## 2021-03-03 ENCOUNTER — RECORDS - HEALTHEAST (OUTPATIENT)
Dept: LAB | Facility: CLINIC | Age: 86
End: 2021-03-03

## 2021-04-01 ENCOUNTER — RECORDS - HEALTHEAST (OUTPATIENT)
Dept: LAB | Facility: CLINIC | Age: 86
End: 2021-04-01

## 2021-04-07 ENCOUNTER — RECORDS - HEALTHEAST (OUTPATIENT)
Dept: LAB | Facility: CLINIC | Age: 86
End: 2021-04-07

## 2021-04-08 LAB
ALBUMIN SERPL-MCNC: 3.6 G/DL (ref 3.5–5)
ALP SERPL-CCNC: 72 U/L (ref 45–120)
ALT SERPL W P-5'-P-CCNC: 22 U/L (ref 0–45)
AST SERPL W P-5'-P-CCNC: 24 U/L (ref 0–40)
BILIRUB DIRECT SERPL-MCNC: 0.1 MG/DL
BILIRUB SERPL-MCNC: 0.5 MG/DL (ref 0–1)
HBA1C MFR BLD: 6.4 %
PROT SERPL-MCNC: 6.4 G/DL (ref 6–8)
TSH SERPL DL<=0.005 MIU/L-ACNC: 2.37 UIU/ML (ref 0.3–5)
VIT B12 SERPL-MCNC: >2000 PG/ML (ref 213–816)

## 2021-04-09 LAB — 25(OH)D3 SERPL-MCNC: 30.4 NG/ML (ref 30–80)

## 2021-04-28 ENCOUNTER — RECORDS - HEALTHEAST (OUTPATIENT)
Dept: LAB | Facility: CLINIC | Age: 86
End: 2021-04-28

## 2021-04-29 LAB
ANION GAP SERPL CALCULATED.3IONS-SCNC: 11 MMOL/L (ref 5–18)
BUN SERPL-MCNC: 24 MG/DL (ref 8–28)
CALCIUM SERPL-MCNC: 8.6 MG/DL (ref 8.5–10.5)
CHLORIDE BLD-SCNC: 104 MMOL/L (ref 98–107)
CO2 SERPL-SCNC: 23 MMOL/L (ref 22–31)
CREAT SERPL-MCNC: 0.77 MG/DL (ref 0.6–1.1)
ERYTHROCYTE [DISTWIDTH] IN BLOOD BY AUTOMATED COUNT: 13.7 % (ref 11–14.5)
GFR SERPL CREATININE-BSD FRML MDRD: >60 ML/MIN/1.73M2
GLUCOSE BLD-MCNC: 180 MG/DL (ref 70–125)
HCT VFR BLD AUTO: 40.3 % (ref 35–47)
HGB BLD-MCNC: 12.9 G/DL (ref 12–16)
MCH RBC QN AUTO: 32 PG (ref 27–34)
MCHC RBC AUTO-ENTMCNC: 32 G/DL (ref 32–36)
MCV RBC AUTO: 100 FL (ref 80–100)
PLATELET # BLD AUTO: 210 THOU/UL (ref 140–440)
PMV BLD AUTO: 10.5 FL (ref 8.5–12.5)
POTASSIUM BLD-SCNC: 4.4 MMOL/L (ref 3.5–5)
RBC # BLD AUTO: 4.03 MILL/UL (ref 3.8–5.4)
SODIUM SERPL-SCNC: 138 MMOL/L (ref 136–145)
WBC: 6 THOU/UL (ref 4–11)

## 2021-05-06 ENCOUNTER — RECORDS - HEALTHEAST (OUTPATIENT)
Dept: LAB | Facility: CLINIC | Age: 86
End: 2021-05-06

## 2021-05-14 ENCOUNTER — RECORDS - HEALTHEAST (OUTPATIENT)
Dept: LAB | Facility: CLINIC | Age: 86
End: 2021-05-14

## 2021-08-05 ENCOUNTER — APPOINTMENT (OUTPATIENT)
Dept: GENERAL RADIOLOGY | Facility: CLINIC | Age: 86
End: 2021-08-05
Attending: EMERGENCY MEDICINE
Payer: COMMERCIAL

## 2021-08-05 ENCOUNTER — HOSPITAL ENCOUNTER (EMERGENCY)
Facility: CLINIC | Age: 86
Discharge: HOME | End: 2021-08-06
Attending: EMERGENCY MEDICINE | Admitting: EMERGENCY MEDICINE
Payer: COMMERCIAL

## 2021-08-05 ENCOUNTER — APPOINTMENT (OUTPATIENT)
Dept: CT IMAGING | Facility: CLINIC | Age: 86
End: 2021-08-05
Attending: EMERGENCY MEDICINE
Payer: COMMERCIAL

## 2021-08-05 ENCOUNTER — DOCUMENTATION ONLY (OUTPATIENT)
Dept: OTHER | Facility: CLINIC | Age: 86
End: 2021-08-05

## 2021-08-05 VITALS
SYSTOLIC BLOOD PRESSURE: 143 MMHG | OXYGEN SATURATION: 93 % | DIASTOLIC BLOOD PRESSURE: 67 MMHG | HEART RATE: 80 BPM | RESPIRATION RATE: 18 BRPM | TEMPERATURE: 98 F

## 2021-08-05 DIAGNOSIS — S50.312A ABRASION OF LEFT ELBOW, INITIAL ENCOUNTER: ICD-10-CM
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
## 2021-08-05 DIAGNOSIS — F03.90 DEMENTIA WITHOUT BEHAVIORAL DISTURBANCE, UNSPECIFIED DEMENTIA TYPE: ICD-10-CM

## 2021-08-05 DIAGNOSIS — S00.83XA CONTUSION OF FOREHEAD, INITIAL ENCOUNTER: ICD-10-CM

## 2021-08-05 LAB
ALBUMIN SERPL-MCNC: 3.1 G/DL (ref 3.4–5)
ALP SERPL-CCNC: 78 U/L (ref 40–150)
ALT SERPL W P-5'-P-CCNC: 25 U/L (ref 0–50)
ANION GAP SERPL CALCULATED.3IONS-SCNC: 6 MMOL/L (ref 3–14)
AST SERPL W P-5'-P-CCNC: 23 U/L (ref 0–45)
BASOPHILS # BLD AUTO: 0 10E3/UL (ref 0–0.2)
BASOPHILS NFR BLD AUTO: 1 %
BILIRUB SERPL-MCNC: 0.2 MG/DL (ref 0.2–1.3)
BUN SERPL-MCNC: 26 MG/DL (ref 7–30)
CALCIUM SERPL-MCNC: 8.6 MG/DL (ref 8.5–10.1)
CHLORIDE BLD-SCNC: 108 MMOL/L (ref 94–109)
CO2 SERPL-SCNC: 24 MMOL/L (ref 20–32)
CREAT SERPL-MCNC: 0.78 MG/DL (ref 0.52–1.04)
EOSINOPHIL # BLD AUTO: 0.1 10E3/UL (ref 0–0.7)
EOSINOPHIL NFR BLD AUTO: 1 %
ERYTHROCYTE [DISTWIDTH] IN BLOOD BY AUTOMATED COUNT: 13.3 % (ref 10–15)
GFR SERPL CREATININE-BSD FRML MDRD: 68 ML/MIN/1.73M2
GLUCOSE BLD-MCNC: 138 MG/DL (ref 70–99)
HCT VFR BLD AUTO: 40.2 % (ref 35–47)
HGB BLD-MCNC: 13 G/DL (ref 11.7–15.7)
HOLD SPECIMEN: NORMAL
IMM GRANULOCYTES # BLD: 0 10E3/UL
IMM GRANULOCYTES NFR BLD: 1 %
LYMPHOCYTES # BLD AUTO: 1.7 10E3/UL (ref 0.8–5.3)
LYMPHOCYTES NFR BLD AUTO: 27 %
MCH RBC QN AUTO: 31.4 PG (ref 26.5–33)
MCHC RBC AUTO-ENTMCNC: 32.3 G/DL (ref 31.5–36.5)
MCV RBC AUTO: 97 FL (ref 78–100)
MONOCYTES # BLD AUTO: 0.5 10E3/UL (ref 0–1.3)
MONOCYTES NFR BLD AUTO: 8 %
NEUTROPHILS # BLD AUTO: 4 10E3/UL (ref 1.6–8.3)
NEUTROPHILS NFR BLD AUTO: 62 %
NRBC # BLD AUTO: 0 10E3/UL
NRBC BLD AUTO-RTO: 0 /100
PLATELET # BLD AUTO: 219 10E3/UL (ref 150–450)
POTASSIUM BLD-SCNC: 4.1 MMOL/L (ref 3.4–5.3)
PROT SERPL-MCNC: 6.3 G/DL (ref 6.8–8.8)
RBC # BLD AUTO: 4.14 10E6/UL (ref 3.8–5.2)
SODIUM SERPL-SCNC: 138 MMOL/L (ref 133–144)
TROPONIN I SERPL-MCNC: <0.015 UG/L (ref 0–0.04)
WBC # BLD AUTO: 6.4 10E3/UL (ref 4–11)

## 2021-08-05 PROCEDURE — 70450 CT HEAD/BRAIN W/O DYE: CPT

## 2021-08-05 PROCEDURE — 73030 X-RAY EXAM OF SHOULDER: CPT | Mod: RT

## 2021-08-05 PROCEDURE — 99285 EMERGENCY DEPT VISIT HI MDM: CPT | Mod: 25

## 2021-08-05 PROCEDURE — 36415 COLL VENOUS BLD VENIPUNCTURE: CPT | Performed by: EMERGENCY MEDICINE

## 2021-08-05 PROCEDURE — 85004 AUTOMATED DIFF WBC COUNT: CPT | Performed by: EMERGENCY MEDICINE

## 2021-08-05 PROCEDURE — 80053 COMPREHEN METABOLIC PANEL: CPT | Performed by: EMERGENCY MEDICINE

## 2021-08-05 PROCEDURE — 84484 ASSAY OF TROPONIN QUANT: CPT | Performed by: EMERGENCY MEDICINE

## 2021-08-05 PROCEDURE — 71046 X-RAY EXAM CHEST 2 VIEWS: CPT

## 2021-08-05 PROCEDURE — 93005 ELECTROCARDIOGRAM TRACING: CPT

## 2021-08-06 ENCOUNTER — LAB REQUISITION (OUTPATIENT)
Dept: LAB | Facility: CLINIC | Age: 86
End: 2021-08-06
Payer: COMMERCIAL

## 2021-08-06 DIAGNOSIS — R30.0 DYSURIA: ICD-10-CM

## 2021-08-06 LAB
ALBUMIN UR-MCNC: 20 MG/DL
AMORPH CRY #/AREA URNS HPF: ABNORMAL /HPF
APPEARANCE UR: ABNORMAL
ATRIAL RATE - MUSE: 77 BPM
BACTERIA #/AREA URNS HPF: ABNORMAL /HPF
BILIRUB UR QL STRIP: NEGATIVE
COLOR UR AUTO: YELLOW
DIASTOLIC BLOOD PRESSURE - MUSE: NORMAL MMHG
GLUCOSE UR STRIP-MCNC: NEGATIVE MG/DL
HGB UR QL STRIP: ABNORMAL
HYALINE CASTS: 1 /LPF
INTERPRETATION ECG - MUSE: NORMAL
KETONES UR STRIP-MCNC: NEGATIVE MG/DL
LEUKOCYTE ESTERASE UR QL STRIP: ABNORMAL
MUCOUS THREADS #/AREA URNS LPF: PRESENT /LPF
NITRATE UR QL: NEGATIVE
P AXIS - MUSE: 28 DEGREES
PH UR STRIP: 5.5 [PH] (ref 5–7)
PR INTERVAL - MUSE: 156 MS
QRS DURATION - MUSE: 66 MS
QT - MUSE: 404 MS
QTC - MUSE: 457 MS
R AXIS - MUSE: 16 DEGREES
RBC URINE: 46 /HPF
SP GR UR STRIP: 1.03 (ref 1–1.03)
SQUAMOUS EPITHELIAL: 6 /HPF
SYSTOLIC BLOOD PRESSURE - MUSE: NORMAL MMHG
T AXIS - MUSE: 110 DEGREES
UROBILINOGEN UR STRIP-MCNC: <2 MG/DL
VENTRICULAR RATE- MUSE: 77 BPM
WBC URINE: 37 /HPF

## 2021-08-06 PROCEDURE — 87086 URINE CULTURE/COLONY COUNT: CPT | Mod: ORL | Performed by: FAMILY MEDICINE

## 2021-08-06 PROCEDURE — 81001 URINALYSIS AUTO W/SCOPE: CPT | Mod: ORL | Performed by: FAMILY MEDICINE

## 2021-08-06 NOTE — ED PROVIDER NOTES
History   Chief Complaint:  Head Injury and Fall     The history is provided by the nursing home.   History supplemented by electronic chart review    Danni Alvarez is a 88 year old female that is DNR/DNI with history of anxiety, hyperlipidemia, GERD and depression who presents with head injury and fall. EMS reports that the patient was found on the floor of her room by her memory care facility staff at her nursing home. She had called out and staff heard her immediately and came to assess.  She was found awake on the floor of her unit just moments later.  They report that she had a large hematoma on her forehead. She has chronic right shoulder pain.  She denies fevers or shortness of breath. EMS notes that her BS was in the 200's.     Review of Systems   Unable to perform ROS: Other (Limited as the patient is a poor historian)     Allergies:  Morphine     Medications:  Xanax  Donepezil   Levothyroxine    Cyanocobalamin      Past Medical History:             Anxiety              Hyperlipidemia              Major depressive disorder  Malignant neoplasm of colon    Mild cognitive impairment  Hypothyroidism             Rhabdomyolysis   B12 deficiency   GERD      Past Surgical History:    Cataract IOL, RT/LT   Colectomy   Hernia   Repair   Repair ptosis bilateral   Tubal ligation       Social History:  Patient presents unaccompanied to the ED.  Patient presents via EMS.   Patient has 3 daughters, 1 of whom came to the emergency department later in her ED course    Physical Exam     Patient Vitals for the past 24 hrs:   BP Temp Temp src Pulse Resp SpO2   08/05/21 2101 (!) 143/67 98  F (36.7  C) Oral 80 18 93 %     Physical Exam  General: Nontoxic-appearing woman sitting upright in room 4  HENT: face nontender with full painless ROM mandible, no bony deformity, OP clear, no difficulty controlling secretions, skull moderately tender to left forehead with significant soft tissue swelling but no palpable crepitus or point  tenderness  Eyes: PERRL without proptosis  CV:  regular rhythm, cap refill normal in all extremities, + symmetric leg edema  Resp: normal effort, speaks in full phrases, no stridor  GI: abdomen soft,  nontender, no guarding  MSK:  Cervical spine:  no midline tenderness, FROM  Thoracic spine: no midline tenderness, no CVAT  Lumbar spine: no midline tenderness  Chest wall: nontender without crepitus  Pelvis stable  Extremities: Mild tenderness and deformity to right shoulder with good range of motion, no palpable joint effusion  Skin:   Abrasion to left elbow  No ecchymosis  No laceration  Neuro: awake, alert, responds appropriately to commands but poor memory of recent events, no nuchal rigidity,  equal, can bear weight on both legs, can lift both legs off bed  Psych: cooperative, smiles, no evidence of hallucinations    Emergency Department Course   ECG:  ECG taken at 2218, ECG read at 2220  Normal sinus rhythm  Possible left atrial enlargement   Low voltage QRS  Cannot rule out anterior infarct, age undetermined   ST & T wave abnormality, consider lateral ischemia   Abnormal ECG  Rate 77 bpm. NV interval 156 ms. QRS duration 66 ms. QT/QTc 404/457 ms. P-R-T axes 28 16 110.    Imaging:  Head CT:  1.  No CT evidence for acute intracranial process.  2.  Brain atrophy and presumed chronic microvascular ischemic changes as above.  3.  Left frontal scalp and periorbital soft tissue hematoma. No acute calvarial fracture.    Reading per radiology    Chest  XR:  No change in appearance of shoulder with superior subluxation of humeral head consistent with chronic rotator cuff tear. Mild degenerative changes. No new fractures.    Deformity of right chest wall relating to multiple right rib fractures, fractures currently appear old, though acute on the previous exam.     Reading per radiology    Shoulder right XR:  No change in appearance of shoulder with superior subluxation of humeral head consistent with chronic rotator  cuff tear. Mild degenerative changes. No new fractures.    Deformity of right chest wall relating to multiple right rib fractures, fractures currently appear old, though acute on the previous exam.     Reading per radiology    Laboratory:  CBC: WBC 6.4, HGB 13.0,    CMP: Glucose 138(H), protein total 6.3(L), Albumin 3.1(L), o/w WNL (Creatinine: 0.78)    Troponin (Collected 2120): <0.015     Emergency Department Course:  Reviewed:  I reviewed the patient's nursing notes, vitals, past medical records, Care Everywhere.     Assessments:  2103 I performed an assessment and examination of the patient as noted above.      2115 I rechecked and updated the patient.     2337 I rechecked the patient, patient's daughter Stephanie is at the bedside.     0057 Findings and plan explained to the Patient and daughter. Patient discharged home with instructions regarding supportive care, medications, and reasons to return. The importance of close follow-up was reviewed.     Disposition:  The patient was discharged to home.       Impression & Plan   Medical Decision Making:  While the cause of her fall is unconfirmed, and I considered a variety of potentially serious causes including cardiac arrhythmia, hypotension, stroke, pulmonary embolism, infection, and others, her assessment is fortunately benign at this time.  The patient and her daughter understand and accept the diagnostic uncertainty.  She has chronic abnormalities on x-rays of her right shoulder and chest.  She is not hypoxic.  Vital signs are satisfactory.  Patient and daughter are very comfortable with the plan for her to be taken back to her care facility for ongoing care.  Close follow-up through primary care is advised.  She should return here for sudden worsening at any hour.      Covid-19  Danni Alvarez was evaluated during a global COVID-19 pandemic, which necessitated consideration that the patient might be at risk for infection with the SARS-CoV-2 virus that  causes COVID-19.   Applicable protocols for evaluation were followed during the patient's care.     Diagnosis:    ICD-10-CM    1. Fall, initial encounter  W19.XXXA    2. Contusion of forehead, initial encounter  S00.83XA    3. Dementia without behavioral disturbance, unspecified dementia type (H)  F03.90    4. Abrasion of left elbow, initial encounter  S50.312A        Scribe Disclosure:  I, Nidhi Sneed, am serving as a scribe at 9:03 PM on 8/5/2021 to document services personally performed by Morgan Bateman MD based on my observations and the provider's statements to me.     This note was completed in part using Dragon voice recognition software. Although reviewed after completion, some word and grammatical errors may occur.          Morgan Btaeman MD  08/06/21 0356

## 2021-08-06 NOTE — ED TRIAGE NOTES
"Memory care pt. Unwitnessed fall, staff heard pt cry out, no known LOC. Pt has hematoma on upper L forehead. Has dressing on skin tear on L elbow and reported previous c/o  \" R shoulder pain\" from previous fall R shoulder  "

## 2021-08-07 LAB — BACTERIA UR CULT: NORMAL

## 2021-08-25 ENCOUNTER — DOCUMENTATION ONLY (OUTPATIENT)
Dept: OTHER | Facility: CLINIC | Age: 86
End: 2021-08-25

## 2021-09-20 ENCOUNTER — MEDICAL CORRESPONDENCE (OUTPATIENT)
Dept: HEALTH INFORMATION MANAGEMENT | Facility: CLINIC | Age: 86
End: 2021-09-20

## 2021-10-13 ENCOUNTER — LAB REQUISITION (OUTPATIENT)
Dept: LAB | Facility: CLINIC | Age: 86
End: 2021-10-13
Payer: COMMERCIAL

## 2021-10-13 DIAGNOSIS — E03.9 HYPOTHYROIDISM, UNSPECIFIED: ICD-10-CM

## 2021-10-13 DIAGNOSIS — F01.518 VASCULAR DEMENTIA WITH BEHAVIORAL DISTURBANCE (H): ICD-10-CM

## 2021-10-13 DIAGNOSIS — E11.9 TYPE 2 DIABETES MELLITUS WITHOUT COMPLICATIONS (H): ICD-10-CM

## 2021-10-13 DIAGNOSIS — E55.9 VITAMIN D DEFICIENCY, UNSPECIFIED: ICD-10-CM

## 2021-10-14 LAB
ALBUMIN SERPL-MCNC: 3.6 G/DL (ref 3.5–5)
ALP SERPL-CCNC: 144 U/L (ref 45–120)
ALT SERPL W P-5'-P-CCNC: 21 U/L (ref 0–45)
ANION GAP SERPL CALCULATED.3IONS-SCNC: 11 MMOL/L (ref 5–18)
AST SERPL W P-5'-P-CCNC: 24 U/L (ref 0–40)
BILIRUB SERPL-MCNC: 0.4 MG/DL (ref 0–1)
BUN SERPL-MCNC: 19 MG/DL (ref 8–28)
CALCIUM SERPL-MCNC: 9.4 MG/DL (ref 8.5–10.5)
CHLORIDE BLD-SCNC: 105 MMOL/L (ref 98–107)
CO2 SERPL-SCNC: 23 MMOL/L (ref 22–31)
CREAT SERPL-MCNC: 0.72 MG/DL (ref 0.6–1.1)
ERYTHROCYTE [DISTWIDTH] IN BLOOD BY AUTOMATED COUNT: 13.7 % (ref 10–15)
GFR SERPL CREATININE-BSD FRML MDRD: 75 ML/MIN/1.73M2
GLUCOSE BLD-MCNC: 112 MG/DL (ref 70–125)
HBA1C MFR BLD: 6.4 %
HCT VFR BLD AUTO: 42.1 % (ref 35–47)
HGB BLD-MCNC: 13.4 G/DL (ref 11.7–15.7)
MCH RBC QN AUTO: 30.5 PG (ref 26.5–33)
MCHC RBC AUTO-ENTMCNC: 31.8 G/DL (ref 31.5–36.5)
MCV RBC AUTO: 96 FL (ref 78–100)
PLATELET # BLD AUTO: 244 10E3/UL (ref 150–450)
POTASSIUM BLD-SCNC: 4.4 MMOL/L (ref 3.5–5)
PROT SERPL-MCNC: 6.7 G/DL (ref 6–8)
RBC # BLD AUTO: 4.4 10E6/UL (ref 3.8–5.2)
SODIUM SERPL-SCNC: 139 MMOL/L (ref 136–145)
T4 FREE SERPL-MCNC: 1 NG/DL (ref 0.7–1.8)
TSH SERPL DL<=0.005 MIU/L-ACNC: 1.86 UIU/ML (ref 0.3–5)
VIT B12 SERPL-MCNC: 527 PG/ML (ref 213–816)
WBC # BLD AUTO: 6.9 10E3/UL (ref 4–11)

## 2021-10-14 PROCEDURE — 83036 HEMOGLOBIN GLYCOSYLATED A1C: CPT | Mod: ORL | Performed by: FAMILY MEDICINE

## 2021-10-14 PROCEDURE — P9603 ONE-WAY ALLOW PRORATED MILES: HCPCS | Mod: ORL | Performed by: FAMILY MEDICINE

## 2021-10-14 PROCEDURE — 82607 VITAMIN B-12: CPT | Mod: ORL | Performed by: FAMILY MEDICINE

## 2021-10-14 PROCEDURE — 84439 ASSAY OF FREE THYROXINE: CPT | Mod: ORL | Performed by: FAMILY MEDICINE

## 2021-10-14 PROCEDURE — 80053 COMPREHEN METABOLIC PANEL: CPT | Mod: ORL | Performed by: FAMILY MEDICINE

## 2021-10-14 PROCEDURE — 84443 ASSAY THYROID STIM HORMONE: CPT | Mod: ORL | Performed by: FAMILY MEDICINE

## 2021-10-14 PROCEDURE — 82306 VITAMIN D 25 HYDROXY: CPT | Mod: ORL | Performed by: FAMILY MEDICINE

## 2021-10-14 PROCEDURE — 85027 COMPLETE CBC AUTOMATED: CPT | Mod: ORL | Performed by: FAMILY MEDICINE

## 2021-10-14 PROCEDURE — 36415 COLL VENOUS BLD VENIPUNCTURE: CPT | Mod: ORL | Performed by: FAMILY MEDICINE

## 2021-10-15 LAB — DEPRECATED CALCIDIOL+CALCIFEROL SERPL-MC: 30 UG/L (ref 30–80)

## 2022-04-13 ENCOUNTER — LAB REQUISITION (OUTPATIENT)
Dept: LAB | Facility: CLINIC | Age: 87
End: 2022-04-13
Payer: COMMERCIAL

## 2022-04-13 DIAGNOSIS — E03.9 HYPOTHYROIDISM, UNSPECIFIED: ICD-10-CM

## 2022-04-13 DIAGNOSIS — E55.9 VITAMIN D DEFICIENCY, UNSPECIFIED: ICD-10-CM

## 2022-04-13 DIAGNOSIS — E11.9 TYPE 2 DIABETES MELLITUS WITHOUT COMPLICATIONS (H): ICD-10-CM

## 2022-04-14 LAB
ANION GAP SERPL CALCULATED.3IONS-SCNC: 10 MMOL/L (ref 5–18)
BUN SERPL-MCNC: 25 MG/DL (ref 8–28)
CALCIUM SERPL-MCNC: 9.4 MG/DL (ref 8.5–10.5)
CHLORIDE BLD-SCNC: 101 MMOL/L (ref 98–107)
CO2 SERPL-SCNC: 27 MMOL/L (ref 22–31)
CREAT SERPL-MCNC: 0.81 MG/DL (ref 0.6–1.1)
GFR SERPL CREATININE-BSD FRML MDRD: 69 ML/MIN/1.73M2
GLUCOSE BLD-MCNC: 75 MG/DL (ref 70–125)
HBA1C MFR BLD: 6.3 %
POTASSIUM BLD-SCNC: 4.6 MMOL/L (ref 3.5–5)
SODIUM SERPL-SCNC: 138 MMOL/L (ref 136–145)
T4 FREE SERPL-MCNC: 1.03 NG/DL (ref 0.7–1.8)

## 2022-04-14 PROCEDURE — 84439 ASSAY OF FREE THYROXINE: CPT | Mod: ORL | Performed by: FAMILY MEDICINE

## 2022-04-14 PROCEDURE — 80048 BASIC METABOLIC PNL TOTAL CA: CPT | Mod: ORL | Performed by: FAMILY MEDICINE

## 2022-04-14 PROCEDURE — P9604 ONE-WAY ALLOW PRORATED TRIP: HCPCS | Mod: ORL | Performed by: FAMILY MEDICINE

## 2022-04-14 PROCEDURE — 36415 COLL VENOUS BLD VENIPUNCTURE: CPT | Mod: ORL | Performed by: FAMILY MEDICINE

## 2022-04-14 PROCEDURE — 83036 HEMOGLOBIN GLYCOSYLATED A1C: CPT | Mod: ORL | Performed by: FAMILY MEDICINE

## 2022-04-20 ENCOUNTER — LAB REQUISITION (OUTPATIENT)
Dept: LAB | Facility: CLINIC | Age: 87
End: 2022-04-20
Payer: COMMERCIAL

## 2022-04-20 DIAGNOSIS — E03.9 HYPOTHYROIDISM, UNSPECIFIED: ICD-10-CM

## 2022-04-20 DIAGNOSIS — E55.9 VITAMIN D DEFICIENCY, UNSPECIFIED: ICD-10-CM

## 2022-04-20 DIAGNOSIS — E11.9 TYPE 2 DIABETES MELLITUS WITHOUT COMPLICATIONS (H): ICD-10-CM

## 2022-04-21 LAB
ANION GAP SERPL CALCULATED.3IONS-SCNC: 16 MMOL/L (ref 5–18)
BUN SERPL-MCNC: 29 MG/DL (ref 8–28)
CALCIUM SERPL-MCNC: 8.9 MG/DL (ref 8.5–10.5)
CHLORIDE BLD-SCNC: 103 MMOL/L (ref 98–107)
CO2 SERPL-SCNC: 20 MMOL/L (ref 22–31)
CREAT SERPL-MCNC: 0.91 MG/DL (ref 0.6–1.1)
GFR SERPL CREATININE-BSD FRML MDRD: 60 ML/MIN/1.73M2
GLUCOSE BLD-MCNC: 222 MG/DL (ref 70–125)
POTASSIUM BLD-SCNC: 4.3 MMOL/L (ref 3.5–5)
SODIUM SERPL-SCNC: 139 MMOL/L (ref 136–145)
T4 FREE SERPL-MCNC: 0.98 NG/DL (ref 0.7–1.8)
TSH SERPL DL<=0.005 MIU/L-ACNC: 1.39 UIU/ML (ref 0.3–5)

## 2022-04-21 PROCEDURE — 82306 VITAMIN D 25 HYDROXY: CPT | Mod: ORL | Performed by: FAMILY MEDICINE

## 2022-04-21 PROCEDURE — 80048 BASIC METABOLIC PNL TOTAL CA: CPT | Mod: ORL | Performed by: FAMILY MEDICINE

## 2022-04-21 PROCEDURE — 84443 ASSAY THYROID STIM HORMONE: CPT | Mod: ORL | Performed by: FAMILY MEDICINE

## 2022-04-21 PROCEDURE — 36415 COLL VENOUS BLD VENIPUNCTURE: CPT | Mod: ORL | Performed by: FAMILY MEDICINE

## 2022-04-21 PROCEDURE — 84439 ASSAY OF FREE THYROXINE: CPT | Mod: ORL | Performed by: FAMILY MEDICINE

## 2022-04-21 PROCEDURE — P9603 ONE-WAY ALLOW PRORATED MILES: HCPCS | Mod: ORL | Performed by: FAMILY MEDICINE

## 2022-04-22 LAB — DEPRECATED CALCIDIOL+CALCIFEROL SERPL-MC: 31 UG/L (ref 20–75)

## 2022-05-25 ENCOUNTER — LAB REQUISITION (OUTPATIENT)
Dept: LAB | Facility: CLINIC | Age: 87
End: 2022-05-25
Payer: COMMERCIAL

## 2022-05-26 LAB
ALBUMIN SERPL-MCNC: 3.5 G/DL (ref 3.5–5)
ALP SERPL-CCNC: 85 U/L (ref 45–120)
ALT SERPL W P-5'-P-CCNC: 14 U/L (ref 0–45)
ANION GAP SERPL CALCULATED.3IONS-SCNC: 8 MMOL/L (ref 5–18)
AST SERPL W P-5'-P-CCNC: 18 U/L (ref 0–40)
BILIRUB SERPL-MCNC: 0.4 MG/DL (ref 0–1)
BUN SERPL-MCNC: 19 MG/DL (ref 8–28)
CALCIUM SERPL-MCNC: 9.3 MG/DL (ref 8.5–10.5)
CHLORIDE BLD-SCNC: 105 MMOL/L (ref 98–107)
CO2 SERPL-SCNC: 28 MMOL/L (ref 22–31)
CREAT SERPL-MCNC: 0.73 MG/DL (ref 0.6–1.1)
ERYTHROCYTE [DISTWIDTH] IN BLOOD BY AUTOMATED COUNT: 13.6 % (ref 10–15)
GFR SERPL CREATININE-BSD FRML MDRD: 78 ML/MIN/1.73M2
GLUCOSE BLD-MCNC: 94 MG/DL (ref 70–125)
HBA1C MFR BLD: 6.4 %
HCT VFR BLD AUTO: 48 % (ref 35–47)
HGB BLD-MCNC: 15.1 G/DL (ref 11.7–15.7)
MCH RBC QN AUTO: 30.9 PG (ref 26.5–33)
MCHC RBC AUTO-ENTMCNC: 31.5 G/DL (ref 31.5–36.5)
MCV RBC AUTO: 98 FL (ref 78–100)
PLATELET # BLD AUTO: 209 10E3/UL (ref 150–450)
POTASSIUM BLD-SCNC: 4.2 MMOL/L (ref 3.5–5)
PROT SERPL-MCNC: 6.6 G/DL (ref 6–8)
RBC # BLD AUTO: 4.88 10E6/UL (ref 3.8–5.2)
SODIUM SERPL-SCNC: 141 MMOL/L (ref 136–145)
TSH SERPL DL<=0.005 MIU/L-ACNC: 1.57 UIU/ML (ref 0.3–5)
WBC # BLD AUTO: 6.4 10E3/UL (ref 4–11)

## 2022-05-26 PROCEDURE — 85027 COMPLETE CBC AUTOMATED: CPT | Mod: ORL | Performed by: PHYSICIAN ASSISTANT

## 2022-05-26 PROCEDURE — 36415 COLL VENOUS BLD VENIPUNCTURE: CPT | Mod: ORL | Performed by: PHYSICIAN ASSISTANT

## 2022-05-26 PROCEDURE — 83036 HEMOGLOBIN GLYCOSYLATED A1C: CPT | Mod: ORL | Performed by: PHYSICIAN ASSISTANT

## 2022-05-26 PROCEDURE — 82306 VITAMIN D 25 HYDROXY: CPT | Mod: ORL | Performed by: PHYSICIAN ASSISTANT

## 2022-05-26 PROCEDURE — 80053 COMPREHEN METABOLIC PANEL: CPT | Mod: ORL | Performed by: PHYSICIAN ASSISTANT

## 2022-05-26 PROCEDURE — P9604 ONE-WAY ALLOW PRORATED TRIP: HCPCS | Mod: ORL | Performed by: PHYSICIAN ASSISTANT

## 2022-05-26 PROCEDURE — 84443 ASSAY THYROID STIM HORMONE: CPT | Mod: ORL | Performed by: PHYSICIAN ASSISTANT

## 2022-05-27 LAB — DEPRECATED CALCIDIOL+CALCIFEROL SERPL-MC: 41 UG/L (ref 20–75)

## 2022-07-20 ENCOUNTER — LAB REQUISITION (OUTPATIENT)
Dept: LAB | Facility: CLINIC | Age: 87
End: 2022-07-20
Payer: COMMERCIAL

## 2022-07-20 DIAGNOSIS — R53.1 WEAKNESS: ICD-10-CM

## 2022-09-19 NOTE — LETTER
5/20/2019        RE: Danni Alvarez  [address]  [facility] [address]        [facility]  PRIMARY CARE PROVIDER AND CLINIC:  Willow Phelan MD, MD, [facility] [address] / [facility] [address]  Chief Complaint   Patient presents with     Hospital F/U     [facility] Record Number:  [id number]  Place of Service where encounter took place:  [facility] [[id number]]    Danni Alvarez  is a 86 year old  (1/16/1933), admitted to the above facility from  [facility]. Hospital stay 5/15/19 through 5/18/19..  Admitted to this facility for  rehab, medical management and nursing care.    HPI:    HPI information obtained from: facility chart records, facility staff and patient report.   Brief Summary of Hospital Course:   Updates on Status Since Skilled nursing Admission:     Patient Danni Alvarez is 86 yr old female admitted to [facility] for rehabilitation s/p hospitalization [facility] 5/15-5/18/19 for rhabdomyolysis, minimal displaced right 2nd-7th rib fractures, right scapula & non-displaced clavicle fracture due to fall with increased confusion  PMHx depression/mild cognitive impairment, hypothyroidism and colon cancer s/p colectomy    TODAY  Patient states she does have pain in right shoulders and pain medications effective  No other complaints    Code status: full code, daughter coming to sign today       CODE STATUS/ADVANCE DIRECTIVES DISCUSSION:   CPR/Full code   Patient's living condition: lives alone  ALLERGIES: Morphine  PAST MEDICAL HISTORY:  has a past medical history of Abnormal feces, Abnormal glucose, Anxiety state, unspecified, Hyperlipidemia, Major depressive disorder, single episode, mild (H), Malignant neoplasm of colon, unspecified site, Mild cognitive impairment, so stated, and Unspecified hypothyroidism.  PAST SURGICAL HISTORY:   has a past surgical history that includes tubal ligation; hernia repair;  "colectomy; cataract iol, rt/lt; and Repair ptosis bilateral (Bilateral, 1/21/2016).  FAMILY HISTORY: family history is not on file.  SOCIAL HISTORY:   reports that she has never smoked. She has never used smokeless tobacco. She reports that she drinks alcohol. She reports that she does not use drugs.    Post Discharge Medication Reconciliation Status: discharge medications reconciled and changed, per note/orders (see AVS)    Current Outpatient Medications   Medication Sig Dispense Refill     acetaminophen (TYLENOL) 325 MG tablet Take 2 tablets (650 mg) by mouth every 4 hours as needed for mild pain       ALPRAZolam (XANAX) 0.25 MG tablet Take 1 tablet (0.25 mg) by mouth nightly as needed 12 tablet 0     Donepezil HCl (ARICEPT PO) Take 10 mg by mouth At Bedtime       Levothyroxine Sodium (LEVOTHROID PO) Take 50 mcg by mouth daily        oxyCODONE (ROXICODONE) 5 MG tablet Take 1 tablet (5 mg) by mouth every 4 hours as needed for moderate to severe pain 15 tablet 0     vitamin B-12 (CYANOCOBALAMIN) 1000 MCG tablet Take 2,000 mcg by mouth daily          ROS:  10 point ROS of systems including Constitutional, Eyes, Respiratory, Cardiovascular, Gastroenterology, Genitourinary, Integumentary, Musculoskeletal, Psychiatric were all negative except for pertinent positives noted in my HPI.    Vitals:  /83   Pulse 95   Temp 97.7  F (36.5  C)   Resp 18   Ht 1.626 m (5' 4\")   Wt 61.7 kg (136 lb)   SpO2 95%   BMI 23.34 kg/m     Exam:  GENERAL APPEARANCE:  Alert, in no distress  ENT:  Mouth and posterior oropharynx normal, moist mucous membranes  EYES:  EOM, conjunctivae, lids, pupils and irises normal  NECK:  No adenopathy,masses or thyromegaly  RESP:  respiratory effort and palpation of chest normal, lungs clear to auscultation , no respiratory distress  ABDOMEN:  normal bowel sounds, soft, nontender, no hepatosplenomegaly or other masses  M/S:   sitting in WC, sling on right ,CMS within normal limits   SKIN:  " Inspection of skin and subcutaneous tissue baseline  NEURO:   Cranial nerves 2-12 are normal tested and grossly at patient's baseline  PSYCH:  memory impaired     Lab/Diagnostic data:  Labs done in SNF are in [facility] EPIC. Please refer to them using [facility]/Care Everywhere.    ASSESSMENT/PLAN:  Rhabdomyolysis/Fall   Minimally displaced right 2nd-7th rib fractures  Right scapula fracture & non-displaced acute clavicle fracture   non-weight bearing RIGHT arm and sling on when up  Pain managed with as needed Tylenol & oxycodone  Plan to schedule Tylenol to better manage pain  Order Tylenol 650mg 3 x daily & daily as needed & Lidocaine patch 4% on AM & off HS (pain)  Follow up Dr. Hayes 2 wks    Statin held, follow up primary care provider regarding restart     Mild cognitive impairment  (Gradual worsening cognition over 6 months with acute worsening with recent injury)  Depression  Per hospital note:   Seen by neurology. Initial CT head with no acute abnormalities.  A CTA of the head/neck showed mild calcified plaque in the left carotid bifurcation and left vertebral artery V4 segment but no evidence of occlusion or stenosis.  In the ED the patient was evaluated by the stroke neurology team with no plans for tPa. MRI shows a small area of restricted diffusion in the subcortical white matter of the left frontal lobe. This is suggestive of a recent small, 6 mm lacunar type infarct, but there is no correlate on ADC imaging and there this would not represent an acute stroke. no further stroke work continue Aricept  Mood stable  Pending cognitive testing   Has as needed Xanax. Plan to wean off if not using or consider alternative safer agent such as Melatonin at night   involved in safe plan home    Hypothyroidism  Continue Synthroid, TSH level 1.12 on 05/12/2019, monitor      H/o Colon cancer  Status post colectomy. Thought to be curative  Patient report regular elimination      Code status: full code,  daughter coming to sign today     BMP,CBC 5/22/19    Orders written by provider at facility      Total time spent with patient visit at the [facility] nursing [facility] was 36 min including patient visit and review of past records. Greater than 50% of total time spent with counseling and coordinating care due to fall, rib fractures, scapula & clavicle fracture & cognitive impairment  Electronically signed by:  GÉNESIS Katz CNP                         Sincerely,        GÉNESIS Katz CNP     Unable to assess

## 2022-11-02 ENCOUNTER — LAB REQUISITION (OUTPATIENT)
Dept: LAB | Facility: CLINIC | Age: 87
End: 2022-11-02
Payer: COMMERCIAL

## 2022-11-02 DIAGNOSIS — E55.9 VITAMIN D DEFICIENCY, UNSPECIFIED: ICD-10-CM

## 2022-11-02 DIAGNOSIS — E03.9 HYPOTHYROIDISM, UNSPECIFIED: ICD-10-CM

## 2022-11-02 DIAGNOSIS — E11.9 TYPE 2 DIABETES MELLITUS WITHOUT COMPLICATIONS (H): ICD-10-CM

## 2022-11-03 LAB
HBA1C MFR BLD: 6.7 %
T4 FREE SERPL-MCNC: 1.27 NG/DL (ref 0.9–1.7)
TSH SERPL DL<=0.005 MIU/L-ACNC: 1.56 UIU/ML (ref 0.3–4.2)

## 2022-11-03 PROCEDURE — 84439 ASSAY OF FREE THYROXINE: CPT | Mod: ORL | Performed by: FAMILY MEDICINE

## 2022-11-03 PROCEDURE — 84443 ASSAY THYROID STIM HORMONE: CPT | Mod: ORL | Performed by: FAMILY MEDICINE

## 2022-11-03 PROCEDURE — 82306 VITAMIN D 25 HYDROXY: CPT | Mod: ORL | Performed by: FAMILY MEDICINE

## 2022-11-03 PROCEDURE — P9604 ONE-WAY ALLOW PRORATED TRIP: HCPCS | Mod: ORL | Performed by: FAMILY MEDICINE

## 2022-11-03 PROCEDURE — 83036 HEMOGLOBIN GLYCOSYLATED A1C: CPT | Mod: ORL | Performed by: FAMILY MEDICINE

## 2022-11-03 PROCEDURE — 36415 COLL VENOUS BLD VENIPUNCTURE: CPT | Mod: ORL | Performed by: FAMILY MEDICINE

## 2022-11-04 LAB — DEPRECATED CALCIDIOL+CALCIFEROL SERPL-MC: 42 UG/L (ref 20–75)

## 2023-01-11 ENCOUNTER — APPOINTMENT (OUTPATIENT)
Dept: CT IMAGING | Facility: CLINIC | Age: 88
DRG: 086 | End: 2023-01-11
Attending: EMERGENCY MEDICINE
Payer: COMMERCIAL

## 2023-01-11 ENCOUNTER — APPOINTMENT (OUTPATIENT)
Dept: CT IMAGING | Facility: CLINIC | Age: 88
DRG: 086 | End: 2023-01-11
Attending: NURSE PRACTITIONER
Payer: COMMERCIAL

## 2023-01-11 ENCOUNTER — APPOINTMENT (OUTPATIENT)
Dept: GENERAL RADIOLOGY | Facility: CLINIC | Age: 88
DRG: 086 | End: 2023-01-11
Attending: EMERGENCY MEDICINE
Payer: COMMERCIAL

## 2023-01-11 ENCOUNTER — HOSPITAL ENCOUNTER (INPATIENT)
Facility: CLINIC | Age: 88
LOS: 2 days | Discharge: NURSING, REHAB OR LONG-TERM CARE | DRG: 086 | End: 2023-01-13
Attending: EMERGENCY MEDICINE | Admitting: INTERNAL MEDICINE
Payer: COMMERCIAL

## 2023-01-11 DIAGNOSIS — S22.41XA CLOSED FRACTURE OF MULTIPLE RIBS OF RIGHT SIDE, INITIAL ENCOUNTER: ICD-10-CM

## 2023-01-11 DIAGNOSIS — S06.5XAA SUBDURAL HEMATOMA (H): Primary | ICD-10-CM

## 2023-01-11 DIAGNOSIS — I62.00 SUBDURAL HEMORRHAGE (H): ICD-10-CM

## 2023-01-11 PROBLEM — S01.01XA SCALP LACERATION: Status: ACTIVE | Noted: 2023-01-11
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
## 2023-01-11 LAB
ABO/RH(D): NORMAL
ALBUMIN SERPL-MCNC: 3.9 G/DL (ref 3.4–5)
ALBUMIN UR-MCNC: NEGATIVE MG/DL
ALP SERPL-CCNC: 93 U/L (ref 40–150)
ALT SERPL W P-5'-P-CCNC: 26 U/L (ref 0–50)
ANION GAP SERPL CALCULATED.3IONS-SCNC: 9 MMOL/L (ref 3–14)
ANTIBODY SCREEN: NEGATIVE
APPEARANCE UR: CLEAR
APTT PPP: 24 SECONDS (ref 22–38)
AST SERPL W P-5'-P-CCNC: 23 U/L (ref 0–45)
ATRIAL RATE - MUSE: 77 BPM
BASOPHILS # BLD AUTO: 0 10E3/UL (ref 0–0.2)
BASOPHILS NFR BLD AUTO: 0 %
BILIRUB SERPL-MCNC: 0.5 MG/DL (ref 0.2–1.3)
BILIRUB UR QL STRIP: NEGATIVE
BUN SERPL-MCNC: 18 MG/DL (ref 7–30)
CALCIUM SERPL-MCNC: 9.2 MG/DL (ref 8.5–10.1)
CHLORIDE BLD-SCNC: 101 MMOL/L (ref 94–109)
CO2 SERPL-SCNC: 25 MMOL/L (ref 20–32)
COLOR UR AUTO: ABNORMAL
CREAT SERPL-MCNC: 0.65 MG/DL (ref 0.52–1.04)
DIASTOLIC BLOOD PRESSURE - MUSE: NORMAL MMHG
EOSINOPHIL # BLD AUTO: 0.1 10E3/UL (ref 0–0.7)
EOSINOPHIL NFR BLD AUTO: 1 %
ERYTHROCYTE [DISTWIDTH] IN BLOOD BY AUTOMATED COUNT: 13.7 % (ref 10–15)
GFR SERPL CREATININE-BSD FRML MDRD: 84 ML/MIN/1.73M2
GLUCOSE BLD-MCNC: 115 MG/DL (ref 70–99)
GLUCOSE UR STRIP-MCNC: NEGATIVE MG/DL
HCT VFR BLD AUTO: 40.7 % (ref 35–47)
HGB BLD-MCNC: 13.2 G/DL (ref 11.7–15.7)
HGB UR QL STRIP: NEGATIVE
IMM GRANULOCYTES # BLD: 0 10E3/UL
IMM GRANULOCYTES NFR BLD: 1 %
INR PPP: 0.95 (ref 0.85–1.15)
INTERPRETATION ECG - MUSE: NORMAL
KETONES UR STRIP-MCNC: ABNORMAL MG/DL
LEUKOCYTE ESTERASE UR QL STRIP: ABNORMAL
LYMPHOCYTES # BLD AUTO: 1 10E3/UL (ref 0.8–5.3)
LYMPHOCYTES NFR BLD AUTO: 12 %
MCH RBC QN AUTO: 31.2 PG (ref 26.5–33)
MCHC RBC AUTO-ENTMCNC: 32.4 G/DL (ref 31.5–36.5)
MCV RBC AUTO: 96 FL (ref 78–100)
MONOCYTES # BLD AUTO: 0.4 10E3/UL (ref 0–1.3)
MONOCYTES NFR BLD AUTO: 5 %
MUCOUS THREADS #/AREA URNS LPF: PRESENT /LPF
NEUTROPHILS # BLD AUTO: 6.6 10E3/UL (ref 1.6–8.3)
NEUTROPHILS NFR BLD AUTO: 81 %
NITRATE UR QL: NEGATIVE
NRBC # BLD AUTO: 0 10E3/UL
NRBC BLD AUTO-RTO: 0 /100
P AXIS - MUSE: 30 DEGREES
PH UR STRIP: 5.5 [PH] (ref 5–7)
PLATELET # BLD AUTO: 229 10E3/UL (ref 150–450)
POTASSIUM BLD-SCNC: 4.4 MMOL/L (ref 3.4–5.3)
PR INTERVAL - MUSE: 154 MS
PROT SERPL-MCNC: 7.2 G/DL (ref 6.8–8.8)
QRS DURATION - MUSE: 74 MS
QT - MUSE: 414 MS
QTC - MUSE: 468 MS
R AXIS - MUSE: -25 DEGREES
RBC # BLD AUTO: 4.23 10E6/UL (ref 3.8–5.2)
RBC URINE: 1 /HPF
SODIUM SERPL-SCNC: 135 MMOL/L (ref 133–144)
SP GR UR STRIP: 1.01 (ref 1–1.03)
SPECIMEN EXPIRATION DATE: NORMAL
SQUAMOUS EPITHELIAL: <1 /HPF
SYSTOLIC BLOOD PRESSURE - MUSE: NORMAL MMHG
T AXIS - MUSE: 92 DEGREES
TROPONIN I SERPL HS-MCNC: 6 NG/L
UROBILINOGEN UR STRIP-MCNC: NORMAL MG/DL
VENTRICULAR RATE- MUSE: 77 BPM
WBC # BLD AUTO: 8.1 10E3/UL (ref 4–11)
WBC URINE: 3 /HPF

## 2023-01-11 PROCEDURE — 73090 X-RAY EXAM OF FOREARM: CPT | Mod: RT

## 2023-01-11 PROCEDURE — 86901 BLOOD TYPING SEROLOGIC RH(D): CPT | Performed by: EMERGENCY MEDICINE

## 2023-01-11 PROCEDURE — 80053 COMPREHEN METABOLIC PANEL: CPT | Performed by: EMERGENCY MEDICINE

## 2023-01-11 PROCEDURE — 36415 COLL VENOUS BLD VENIPUNCTURE: CPT | Performed by: EMERGENCY MEDICINE

## 2023-01-11 PROCEDURE — 96374 THER/PROPH/DIAG INJ IV PUSH: CPT | Mod: 59

## 2023-01-11 PROCEDURE — 84484 ASSAY OF TROPONIN QUANT: CPT | Performed by: EMERGENCY MEDICINE

## 2023-01-11 PROCEDURE — 99291 CRITICAL CARE FIRST HOUR: CPT | Mod: 25

## 2023-01-11 PROCEDURE — 250N000013 HC RX MED GY IP 250 OP 250 PS 637: Performed by: INTERNAL MEDICINE

## 2023-01-11 PROCEDURE — 85025 COMPLETE CBC W/AUTO DIFF WBC: CPT | Performed by: EMERGENCY MEDICINE

## 2023-01-11 PROCEDURE — 258N000003 HC RX IP 258 OP 636: Performed by: EMERGENCY MEDICINE

## 2023-01-11 PROCEDURE — 250N000011 HC RX IP 250 OP 636: Performed by: EMERGENCY MEDICINE

## 2023-01-11 PROCEDURE — 72125 CT NECK SPINE W/O DYE: CPT

## 2023-01-11 PROCEDURE — 250N000009 HC RX 250: Performed by: EMERGENCY MEDICINE

## 2023-01-11 PROCEDURE — 70450 CT HEAD/BRAIN W/O DYE: CPT | Mod: 77

## 2023-01-11 PROCEDURE — 96361 HYDRATE IV INFUSION ADD-ON: CPT

## 2023-01-11 PROCEDURE — 120N000001 HC R&B MED SURG/OB

## 2023-01-11 PROCEDURE — 85610 PROTHROMBIN TIME: CPT | Performed by: EMERGENCY MEDICINE

## 2023-01-11 PROCEDURE — 74177 CT ABD & PELVIS W/CONTRAST: CPT

## 2023-01-11 PROCEDURE — 99222 1ST HOSP IP/OBS MODERATE 55: CPT | Performed by: NURSE PRACTITIONER

## 2023-01-11 PROCEDURE — 93005 ELECTROCARDIOGRAM TRACING: CPT

## 2023-01-11 PROCEDURE — 70486 CT MAXILLOFACIAL W/O DYE: CPT

## 2023-01-11 PROCEDURE — 99292 CRITICAL CARE ADDL 30 MIN: CPT

## 2023-01-11 PROCEDURE — 99223 1ST HOSP IP/OBS HIGH 75: CPT | Mod: AI | Performed by: INTERNAL MEDICINE

## 2023-01-11 PROCEDURE — 85730 THROMBOPLASTIN TIME PARTIAL: CPT | Performed by: EMERGENCY MEDICINE

## 2023-01-11 PROCEDURE — 81001 URINALYSIS AUTO W/SCOPE: CPT | Performed by: EMERGENCY MEDICINE

## 2023-01-11 PROCEDURE — 70450 CT HEAD/BRAIN W/O DYE: CPT

## 2023-01-11 RX ORDER — AMOXICILLIN 250 MG
2 CAPSULE ORAL 2 TIMES DAILY PRN
Status: DISCONTINUED | OUTPATIENT
Start: 2023-01-11 | End: 2023-01-13 | Stop reason: HOSPADM

## 2023-01-11 RX ORDER — ASPIRIN 81 MG/1
81 TABLET, CHEWABLE ORAL DAILY
Status: ON HOLD | COMMUNITY
End: 2023-01-13

## 2023-01-11 RX ORDER — VITAMIN B COMPLEX
75 TABLET ORAL DAILY
COMMUNITY

## 2023-01-11 RX ORDER — ACETAMINOPHEN 650 MG/1
650 SUPPOSITORY RECTAL EVERY 6 HOURS PRN
Status: DISCONTINUED | OUTPATIENT
Start: 2023-01-11 | End: 2023-01-13 | Stop reason: HOSPADM

## 2023-01-11 RX ORDER — LABETALOL HYDROCHLORIDE 5 MG/ML
20 INJECTION, SOLUTION INTRAVENOUS ONCE
Status: COMPLETED | OUTPATIENT
Start: 2023-01-11 | End: 2023-01-11

## 2023-01-11 RX ORDER — LIDOCAINE 40 MG/G
CREAM TOPICAL
Status: DISCONTINUED | OUTPATIENT
Start: 2023-01-11 | End: 2023-01-13 | Stop reason: HOSPADM

## 2023-01-11 RX ORDER — AMOXICILLIN 250 MG
2 CAPSULE ORAL
COMMUNITY

## 2023-01-11 RX ORDER — ACETAMINOPHEN 325 MG/1
650 TABLET ORAL 2 TIMES DAILY
Status: DISCONTINUED | OUTPATIENT
Start: 2023-01-11 | End: 2023-01-13 | Stop reason: HOSPADM

## 2023-01-11 RX ORDER — AMLODIPINE BESYLATE 5 MG/1
5 TABLET ORAL DAILY
Status: DISCONTINUED | OUTPATIENT
Start: 2023-01-12 | End: 2023-01-13 | Stop reason: HOSPADM

## 2023-01-11 RX ORDER — ONDANSETRON 2 MG/ML
4 INJECTION INTRAMUSCULAR; INTRAVENOUS EVERY 6 HOURS PRN
Status: DISCONTINUED | OUTPATIENT
Start: 2023-01-11 | End: 2023-01-13 | Stop reason: HOSPADM

## 2023-01-11 RX ORDER — IOPAMIDOL 755 MG/ML
71 INJECTION, SOLUTION INTRAVASCULAR ONCE
Status: COMPLETED | OUTPATIENT
Start: 2023-01-11 | End: 2023-01-11

## 2023-01-11 RX ORDER — AMOXICILLIN 250 MG
1 CAPSULE ORAL 2 TIMES DAILY PRN
Status: DISCONTINUED | OUTPATIENT
Start: 2023-01-11 | End: 2023-01-13 | Stop reason: HOSPADM

## 2023-01-11 RX ORDER — LEVOTHYROXINE SODIUM 50 UG/1
50 TABLET ORAL DAILY
Status: DISCONTINUED | OUTPATIENT
Start: 2023-01-12 | End: 2023-01-13 | Stop reason: HOSPADM

## 2023-01-11 RX ORDER — ONDANSETRON 4 MG/1
4 TABLET, ORALLY DISINTEGRATING ORAL EVERY 6 HOURS PRN
Status: DISCONTINUED | OUTPATIENT
Start: 2023-01-11 | End: 2023-01-13 | Stop reason: HOSPADM

## 2023-01-11 RX ORDER — ACETAMINOPHEN 325 MG/1
650 TABLET ORAL EVERY 6 HOURS PRN
Status: DISCONTINUED | OUTPATIENT
Start: 2023-01-11 | End: 2023-01-13 | Stop reason: HOSPADM

## 2023-01-11 RX ORDER — AMOXICILLIN 250 MG
2 CAPSULE ORAL
Status: DISCONTINUED | OUTPATIENT
Start: 2023-01-13 | End: 2023-01-13 | Stop reason: HOSPADM

## 2023-01-11 RX ORDER — DONEPEZIL HYDROCHLORIDE 10 MG/1
10 TABLET, FILM COATED ORAL AT BEDTIME
Status: DISCONTINUED | OUTPATIENT
Start: 2023-01-11 | End: 2023-01-13 | Stop reason: HOSPADM

## 2023-01-11 RX ORDER — HYDRALAZINE HYDROCHLORIDE 20 MG/ML
10 INJECTION INTRAMUSCULAR; INTRAVENOUS EVERY 4 HOURS PRN
Status: DISCONTINUED | OUTPATIENT
Start: 2023-01-11 | End: 2023-01-13 | Stop reason: HOSPADM

## 2023-01-11 RX ORDER — SERTRALINE HYDROCHLORIDE 25 MG/1
75 TABLET, FILM COATED ORAL DAILY
COMMUNITY

## 2023-01-11 RX ORDER — QUETIAPINE FUMARATE 25 MG/1
12.5 TABLET, FILM COATED ORAL
COMMUNITY

## 2023-01-11 RX ADMIN — ACETAMINOPHEN 650 MG: 325 TABLET, FILM COATED ORAL at 20:29

## 2023-01-11 RX ADMIN — IOPAMIDOL 71 ML: 755 INJECTION, SOLUTION INTRAVENOUS at 14:38

## 2023-01-11 RX ADMIN — Medication 1 MG: at 20:29

## 2023-01-11 RX ADMIN — DICLOFENAC SODIUM 2 G: 10 GEL TOPICAL at 20:25

## 2023-01-11 RX ADMIN — DONEPEZIL HYDROCHLORIDE 10 MG: 10 TABLET ORAL at 21:57

## 2023-01-11 RX ADMIN — LABETALOL HYDROCHLORIDE 20 MG: 5 INJECTION INTRAVENOUS at 15:06

## 2023-01-11 RX ADMIN — SODIUM CHLORIDE 40 ML: 900 INJECTION INTRAVENOUS at 14:38

## 2023-01-11 RX ADMIN — SODIUM CHLORIDE 500 ML: 9 INJECTION, SOLUTION INTRAVENOUS at 13:23

## 2023-01-11 RX ADMIN — SERTRALINE HYDROCHLORIDE 75 MG: 50 TABLET ORAL at 20:25

## 2023-01-11 ASSESSMENT — ACTIVITIES OF DAILY LIVING (ADL)
ADLS_ACUITY_SCORE: 35

## 2023-01-11 ASSESSMENT — ENCOUNTER SYMPTOMS: ABDOMINAL PAIN: 0

## 2023-01-11 NOTE — ED PROVIDER NOTES
History     Chief Complaint:  Fall and Head Injury       The history is provided by the patient and a relative. The history is limited by the condition of the patient.      Danni Alvarez is a 89 year old female with a history of dementia who presents via EMS with a head injury following a fall. Her relative explains that she suffered an unwitnessed fall at her care facility. The patient denies any chest pain or abdominal pain. She takes baby Aspirin but her relative denies the patieint taking any other blood thinners. Patient denies any other pain or symptoms but history is unreliable due to her dementia. She is oriented to her first name only. She is unable to describe what happened that led to the fall. She denies any other symptoms.    Review of External Notes: I reviewed nursing home records; medications, history, and POLST     ROS:  Review of Systems   Unable to perform ROS: Dementia   Cardiovascular: Negative for chest pain.   Gastrointestinal: Negative for abdominal pain.       Allergies:  Morphine     Medications:    Xanax  Aricept  Levothroid  Aspirin 81mg  Ditropan  Cyanocobalamin    Past Medical History:    Anxiety  Hyperlipidemia  MDD  Malignant neoplasm of colon  Vascular dementia without behavioral disturbance  Hypothyroidism  Shoulder impingement syndrome  Rhabdomyolysis  B12 deficiency  GERD  HSV  Urinary incontinence  Physical deconditioning  Atherosclerosis of aorta  Diabetes mellitus, type 2    Past Surgical History:    Cataract IOL, RT/LT  Colectomy  Hernia repair  Repair ptosis bilateral  Tubal ligation     Family History:    Mother: Alcoholism, Emphysema  Father: MI  Sister: Brain aneurism    Social History:  Patient came from her living facility via EMS.  Patient is accompanied in the ED.  PCP: Chris Lane     Physical Exam     Patient Vitals for the past 24 hrs:   BP Temp Temp src Pulse Resp SpO2 Height Weight   01/11/23 1301 (!) 154/89 97.8  F (36.6  C) Oral 80 16 95 % -- --  "  01/11/23 1300 -- -- -- -- -- -- 1.626 m (5' 4\") 63.5 kg (140 lb)        Physical Exam  General: Well appearing, nontoxic. Resting comfortably  Head:  Large right frontal scalp hematoma with superficial abrasion.  No full-thickness laceration.  No definite bony tenderness or crepitus.  Eyes:  Pupils are equal, round, reactive to light EOMI, no nystagmus. Significant right periorbital soft tissue swelling and ecchymosis.     Conjunctivae non-injected and sclerae white  ENT:    The external nose is normal, non tender to palpation. No epistaxis.  Remainder of the facial bones are otherwise atraumatic.    Pinnae are normal  Neck:  Cervical spine nontender, no stepoffs    There is no rigidity noted    Trachea is in the midline  CV:  Regular rate and rhythm     Normal S1/S2, no S3/S4    No murmur or rub. Radial pulses 2+ bilaterally.  Resp:  Lungs are clear and equal bilaterally  There is no tachypnea    No increased work of breathing    No rales, wheezing, or rhonchi  GI:  Abdomen is soft, no rigidity or guarding    No distension, or mass    No tenderness or rebound tenderness   MS:  Normal muscular tone.  Large hematoma to the subcutaneous tissues of the right lateral forearm.  No associated bony tenderness.  Full range of motion of all extremities without pain.  Remainder of the extremities are otherwise atraumatic.  Chest wall is nontender to palpation.  Hips and pelvis are stable and nontender.    Symmetric motor strength    No lower extremity edema  Skin:  No rash or acute skin lesions noted  Neuro: Awake and alert oriented to person name only.  No facial droop.  Cranial nerves II to XII intact.  Speech is stuttering with at times nonsensical responses to questions.  Strength 5 out of 5 and intact in all extremities.  Sensation intact to light touch throughout.  GCS 14.  Moves all extremities spontaneously  Psych:  Normal affect. Appropriate interactions.      Emergency Department Course     ECG  ECG taken at 1321, " ECG read at 1523  Normal sinus rhythm   Possible Left atrial enlargement   Cannot rule out Inferior infarct, age undetermined  NO change as compared to prior, dated 08/05/2021.  Rate 77 bpm. UT interval 154 ms. QRS duration 74 ms. QT/QTc 414/468 ms. P-R-T axes 30 -25 92.     Imaging:  CT Head w/o Contrast   Final Result   IMPRESSION:   1.  Unchanged right convexity subdural hematoma and mild locoregional mass effect.   2.  No new or worsening intracranial blood products. No midline shift.   3.  Right supraorbital soft tissue hematoma and laceration as before.      CT Chest/Abdomen/Pelvis w Contrast   Final Result   IMPRESSION:    1.  Mildly displaced fractures of the right tenth and eleventh ribs   posterolaterally are age-indeterminate, and could be acute. Please   clinically correlate.   2.  No other acute posttraumatic abnormality is identified in the   chest, abdomen, or pelvis.      FARIDA SWAIN MD            SYSTEM ID:  [id number]      CT Facial Bones without Contrast   Final Result   IMPRESSION:   1. No acute facial bone or mandibular fracture.   2. Moderate-sized right frontal scalp subgaleal hematoma with hematoma   extending to the lateral right periorbital soft tissues. Accompanying   tiny foci of air suggest an associated scalp laceration.      YKLE STEPHENS MD            SYSTEM ID:  [id number]      CT Cervical Spine w/o Contrast   Final Result   IMPRESSION:   1. No acute cervical spine fracture.      2. Osteopenia with degenerative change as above. No high-grade spinal   canal stenosis. Neural foraminal stenosis is described above.      KYLE STEPHENS MD            SYSTEM ID:  [id number]      Radius/Ulna XR, PA & LAT, right   Final Result   IMPRESSION: Soft tissue swelling in the proximal forearm. Osteopenia.   No fractures are evident. Degenerative changes in the elbow and wrist.         EDURADO MARTINEZ MD            SYSTEM ID:  [id number]      Head CT w/o contrast   Final Result   IMPRESSION:   1.  Mixed density predominantly hyperdense/acute right cerebral   convexity subdural hematoma measuring up to 12 mm in thickness.   Associated midline shift is minimal.      2. No additional foci of intracranial hemorrhage elsewhere.      3. Moderate-sized right frontal scalp subgaleal hematoma extending to   the right periorbital soft tissues. Associated small foci of air   suggest an accompanying scalp laceration.      4. Background age-related changes as above.      Findings and impression discussed with Dr. Ramesh by phone at 1258 hours   on 1/11/2023.      KYLE STEPHENS MD            SYSTEM ID:  [id number]         Report per radiology    Laboratory:  Labs Ordered and Resulted from Time of ED Arrival to Time of ED Departure   COMPREHENSIVE METABOLIC PANEL - Abnormal       Result Value    Sodium 135      Potassium 4.4      Chloride 101      Carbon Dioxide (CO2) 25      Anion Gap 9      Urea Nitrogen 18      Creatinine 0.65      Calcium 9.2      Glucose 115 (*)     Alkaline Phosphatase 93      AST 23      ALT 26      Protein Total 7.2      Albumin 3.9      Bilirubin Total 0.5      GFR Estimate 84     INR - Normal    INR 0.95     PARTIAL THROMBOPLASTIN TIME - Normal    aPTT 24     TROPONIN I - Normal    Troponin I High Sensitivity 6     CBC WITH PLATELETS AND DIFFERENTIAL    WBC Count 8.1      RBC Count 4.23      Hemoglobin 13.2      Hematocrit 40.7      MCV 96      MCH 31.2      MCHC 32.4      RDW 13.7      Platelet Count 229      % Neutrophils 81      % Lymphocytes 12      % Monocytes 5      % Eosinophils 1      % Basophils 0      % Immature Granulocytes 1      NRBCs per 100 WBC 0      Absolute Neutrophils 6.6      Absolute Lymphocytes 1.0      Absolute Monocytes 0.4      Absolute Eosinophils 0.1      Absolute Basophils 0.0      Absolute Immature Granulocytes 0.0      Absolute NRBCs 0.0     ROUTINE UA WITH MICROSCOPIC REFLEX TO CULTURE   TYPE AND SCREEN, ADULT    ABO/RH(D) A POS      Antibody Screen Negative       SPECIMEN EXPIRATION DATE [id number]     ABO/RH TYPE AND SCREEN        Emergency Department Course & Assessments:             Interventions:  Medications   lidocaine 1 % 0.1-1 mL (has no administration in time range)   lidocaine (LMX4) cream (has no administration in time range)   sodium chloride (PF) 0.9% PF flush 3 mL (3 mLs Intracatheter Not Given 1/11/23 1628)   sodium chloride (PF) 0.9% PF flush 3 mL (has no administration in time range)   melatonin tablet 1 mg (has no administration in time range)   acetaminophen (TYLENOL) tablet 650 mg (has no administration in time range)     Or   acetaminophen (TYLENOL) Suppository 650 mg (has no administration in time range)   traMADol (ULTRAM) half-tab 25 mg (has no administration in time range)   senna-docusate (SENOKOT-S/PERICOLACE) 8.6-50 MG per tablet 1 tablet (has no administration in time range)     Or   senna-docusate (SENOKOT-S/PERICOLACE) 8.6-50 MG per tablet 2 tablet (has no administration in time range)   ondansetron (ZOFRAN ODT) ODT tab 4 mg (has no administration in time range)     Or   ondansetron (ZOFRAN) injection 4 mg (has no administration in time range)   hydrALAZINE (APRESOLINE) injection 10 mg (has no administration in time range)   amLODIPine (NORVASC) tablet 5 mg (has no administration in time range)   acetaminophen (TYLENOL) tablet 650 mg (has no administration in time range)   diclofenac (VOLTAREN) 1 % topical gel 2 g (has no administration in time range)   donepezil (ARICEPT) tablet 10 mg (has no administration in time range)   levothyroxine (SYNTHROID/LEVOTHROID) tablet 50 mcg (has no administration in time range)   QUEtiapine (SEROquel) half-tab 12.5 mg (has no administration in time range)   senna-docusate (SENOKOT-S/PERICOLACE) 8.6-50 MG per tablet 2 tablet (has no administration in time range)   sertraline (ZOLOFT) tablet 75 mg (has no administration in time range)   0.9% sodium chloride BOLUS (0 mLs Intravenous Stopped 1/11/23 1417)    labetalol (NORMODYNE/TRANDATE) injection 20 mg (20 mg Intravenous Given 1/11/23 1506)   iopamidol (ISOVUE-370) solution 71 mL (71 mLs Intravenous Given 1/11/23 1438)   Saline Flush (40 mLs Intravenous Given 1/11/23 1438)        Independent Interpretation (X-rays, CTs, rhythm strip):  I independently reviewed CT, XR, PA & LAT.       Consultations/Discussion of Management or Tests:  1257 I obtained history and examined the patient as noted above.  1333 I spoke with Anne Izquierdo NP of Neurosurgery regarding the patient  ED Course as of 01/11/23 1436 Wed Jan 11, 2023   1253 I performed independent interpretation of the patient's head CT scan which reveals findings concerning for mixed density acute subdural hemorrhage on the right.       Social Determinants of Health affecting care:  None    Disposition:  The patient was admitted to the hospital under the care of Dr. Shipley.     Impression & Plan        Medical Decision Making:  Danni Alvarez is a 89 year old female who presents to the emergency department for evaluation after a fall at her living facility.  On my evaluation ABCs are intact.  She has no focal neurologic deficits.  Patient is at her baseline according to her family member who is present at the bedside.  She is a large right frontal scalp hematoma with superficial abrasion.  No laceration amenable to suture closure.  CT scan of the head reveals acute right subdural hematoma without significant midline shift or herniation.  CT cervical spine without fracture or traumatic injury.  CT facial bones without facial bone fracture.  CT scan of the chest abdomen pelvis with age-indeterminate fractures of the right 10th and 11th ribs which certainly may be acute given her current presentation.  No pneumothorax or hemothorax.  No other solid organ injury or evidence of other acute traumatic injuries.  Remainder of her ED evaluation is otherwise reassuring.  UA negative for UTI.  Patient is not  anticoagulated.  CBC, CMP and troponin are unremarkable.  Glucose is reassuring.  Findings were discussed with neurosurgery NP.  Blood pressure goal less than 150.  Patient was treated with IV labetalol for hypertension in the setting of acute intracranial hemorrhage.  No indication for emergent neurosurgical intervention at this time.  Close neurochecks and repeat head CT to assess for stability.  Findings and plan of care were discussed with the patient's daughter who is agreeable.  The patient was discussed with the hospitalist and the patient was admitted in stable condition.    Critical Care Time:   Upon my evaluation, this patient had a critical illness with high probability of imminent or life-threatening deterioration due to acute intracranial hemorrhage and hypertensive emergency, which required my direct attention, intervention, and personal management.    I have personally provided 32 minutes of critical care time exclusive of time spent on separately billable procedures. Time includes review of laboratory data, radiology results, discussion with consultants, reassessment of the patient and monitoring for potential decompensation. Interventions were performed as documented above.     Diagnosis:    ICD-10-CM    1. Subdural hemorrhage (H)  I62.00       2. Closed fracture of multiple ribs of right side, initial encounter  S22.41XA              Scribe Disclosure:  SIERRA NAVARRO, am serving as a scribe at 12:56 PM on 1/11/2023 to document services personally performed by Cam Ramesh MD based on my observations and the provider's statements to me.            Cam Ramesh MD  01/11/23 1959

## 2023-01-11 NOTE — PHARMACY-ADMISSION MEDICATION HISTORY
Pharmacy Medication History  Admission medication history interview status for the 1/11/2023  admission is complete. See EPIC admission navigator for prior to admission medications     Location of Interview: Patient room  Medication history sources: [place] and MAR ([facility])    In the past week, patient estimated taking medication this percent of the time: greater than 90%    Medication reconciliation completed by provider prior to medication history? No    Time spent in this activity: 10 min    Prior to Admission medications    Medication Sig Last Dose Taking? Auth Provider Long Term End Date   acetaminophen (TYLENOL) 325 MG tablet Take 650 mg by mouth 2 times daily 1/11/2023 at AM Yes Reported, Patient     aspirin (ASA) 81 MG chewable tablet Take 81 mg by mouth daily 1/10/2023 Yes Unknown, Entered By History     diclofenac (VOLTAREN) 1 % topical gel Apply 2 g topically 2 times daily To R shoulder 1/11/2023 Yes Unknown, Entered By History     Levothyroxine Sodium (LEVOTHROID PO) Take 50 mcg by mouth daily  1/11/2023 Yes Reported, Patient Yes    QUEtiapine (SEROQUEL) 25 MG tablet Take 12.5 mg by mouth nightly as needed (agitation) Past Month Yes Unknown, Entered By History Yes    senna-docusate (SENOKOT-S/PERICOLACE) 8.6-50 MG tablet Take 2 tablets by mouth Every Mon, Wed, Fri Morning Past Week Yes Unknown, Entered By History     sertraline (ZOLOFT) 25 MG tablet Take 75 mg by mouth daily 1/10/2023 Yes Unknown, Entered By History Yes    Vitamin D3 (CHOLECALCIFEROL) 25 mcg (1000 units) tablet Take 75 mcg by mouth daily 1/11/2023 Yes Unknown, Entered By History     Donepezil HCl (ARICEPT PO) Take 10 mg by mouth At Bedtime   Reported, Patient         The information provided in this note is only as accurate as the sources available at the time of update(s)

## 2023-01-11 NOTE — CONSULTS
[facility]    Neurosurgery Consultation     Date of Admission:  1/11/2023  Date of Consult (When I saw the patient): 01/11/23    Assessment & Plan   Danni Alvarez is an 89 year old female with history of dementia who was admitted on 1/11/2023 after an unwitnessed fall at her nursing home. Imaging shows an acute right cerebral convexity subdural hematoma measuring up to 12 mm in thickness, no significant midline shift. Patient takes Aspirin 81mg daily per PTA med list. Per daughter at bedside, patient is at baseline neuro exam at this time. Patient denies any headaches, n/v, dizziness, or other new/worsening symptoms. Daughter states patient is DNR/DNI, but she'd like to confirm this with her other sister as well.      Plan:  - No surgical intervention planned at this time   - Admission through Hospitalist team   - Repeat head CT in 6 hours  - Hold anticoagulation  - Neuro checks q 2 hours  - SBP less than 150     I have discussed the following assessment and plan with Dr. Rosales.     Anne Izquierdo CNP  [facility] [place] [address]  Tel [phone]  Pager [phone]    Code Status    Prior    Reason for Consult   Reason for consult: I was asked by Dr. Ramesh to evaluate this patient for SDH s/p fall.    Primary Care Physician   Chris Lane NP    Chief Complaint   SDH, fall     History is obtained from the electronic health record, emergency department physician and patient's daughter    History of Present Illness   Danni Alvarez is an 89 year old female with history of dementia who was admitted on 1/11/2023 after an unwitnessed fall at her nursing home. Patient was brought to the ED for further evaluation. Imaging shows an acute right cerebral convexity subdural hematoma measuring up to 12 mm in thickness, no significant midline shift. Patient takes Aspirin 81mg daily per PTA med list. Per daughter  at bedside, patient is at baseline neuro exam at this time. Patient denies any headaches, n/v, dizziness, or other new/worsening symptoms. Daughter states patient is DNR/DNI, but she'd like to confirm this with her other sister as well.     CT HEAD WITHOUT CONTRAST  1/11/2023 12:49 PM  IMPRESSION:  1. Mixed density predominantly hyperdense/acute right cerebral  convexity subdural hematoma measuring up to 12 mm in thickness.  Associated midline shift is minimal.  2. No additional foci of intracranial hemorrhage elsewhere.  3. Moderate-sized right frontal scalp subgaleal hematoma extending to  the right periorbital soft tissues. Associated small foci of air  suggest an accompanying scalp laceration.  4. Background age-related changes as above.       Past Medical History   I have reviewed this patient's medical history and updated it with pertinent information if needed.   Past Medical History:   Diagnosis Date     Abnormal feces      Abnormal glucose      Anxiety state, unspecified      Hyperlipidemia      Major depressive disorder, single episode, mild (H)      Malignant neoplasm of colon, unspecified site      Mild cognitive impairment, so stated      Unspecified hypothyroidism        Past Surgical History   I have reviewed this patient's surgical history and updated it with pertinent information if needed.  Past Surgical History:   Procedure Laterality Date     CATARACT IOL, RT/LT      right and left     COLECTOMY       HERNIA REPAIR       REPAIR PTOSIS BILATERAL Bilateral 1/21/2016    Procedure: REPAIR PTOSIS BILATERAL;  Surgeon: Jose Stevens MD;  Location: [facility]     TUBAL LIGATION         Prior to Admission Medications   Prior to Admission Medications   Prescriptions Last Dose Informant Patient Reported? Taking?   ALPRAZolam (XANAX) 0.25 MG tablet   Yes No   Sig: Take 0.25 mg by mouth nightly as needed for anxiety   ALPRAZolam (XANAX) 0.25 MG tablet   No No   Sig: Take 1 tablet (0.25 mg) by mouth nightly as  "needed for anxiety   Donepezil HCl (ARICEPT PO)   Yes No   Sig: Take 10 mg by mouth At Bedtime   Levothyroxine Sodium (LEVOTHROID PO)   Yes No   Sig: Take 50 mcg by mouth daily    Lidocaine (LIDOCARE) 4 % Patch   Yes No   Sig: Place 1 patch onto the skin daily    acetaminophen (TYLENOL) 325 MG tablet   Yes No   Sig: Take 650 mg by mouth every 4 hours as needed    vitamin B-12 (CYANOCOBALAMIN) 1000 MCG tablet   Yes No   Sig: Take 2,000 mcg by mouth daily       Facility-Administered Medications: None     Allergies   Allergies   Allergen Reactions     Morphine        Social History   I have reviewed this patient's social history and updated it with pertinent information if needed. Danni Alvarez  reports that she has never smoked. She has never used smokeless tobacco. She reports current alcohol use. She reports that she does not use drugs.    Family History   I have reviewed this patient's family history and updated it with pertinent information if needed.   No family history on file.    Review of Systems   10 point ROS negative other than symptoms noted in HPI.    Physical Exam   Temp: 97.8  F (36.6  C) Temp src: Oral BP: (!) 154/89 Pulse: 80   Resp: 16 SpO2: 95 % O2 Device: None (Room air)    Vital Signs with Ranges  Temp:  [97.8  F (36.6  C)] 97.8  F (36.6  C)  Pulse:  [80] 80  Resp:  [16] 16  BP: (154)/(89) 154/89  SpO2:  [95 %] 95 %  140 lbs 0 oz     , Blood pressure (!) 154/89, pulse 80, temperature 97.8  F (36.6  C), temperature source Oral, resp. rate 16, height 1.626 m (5' 4\"), weight 63.5 kg (140 lb), SpO2 95 %.  140 lbs 0 oz    NEUROLOGICAL EXAMINATION:   Awake, alert, oriented to self, baseline dementia  Baseline slurred/stuttering speech   PERRL  Following commands  Moves all extremities equally   Bruising noted to right eye and right elbow/forearm     Data     CBC RESULTS:   Recent Labs   Lab Test 05/26/22  1020   WBC 6.4   RBC 4.88   HGB 15.1   HCT 48.0*   MCV 98   MCH 30.9   MCHC 31.5   RDW 13.6 "        Basic Metabolic Panel:  Lab Results   Component Value Date     05/26/2022     06/05/2019      Lab Results   Component Value Date    POTASSIUM 4.2 05/26/2022    POTASSIUM 4.1 06/05/2019     Lab Results   Component Value Date    CHLORIDE 105 05/26/2022    CHLORIDE 104 06/05/2019     Lab Results   Component Value Date    ELY 9.3 05/26/2022    ELY 9.2 06/05/2019     Lab Results   Component Value Date    CO2 28 05/26/2022    CO2 27 06/05/2019     Lab Results   Component Value Date    BUN 19 05/26/2022    BUN 14 06/05/2019     Lab Results   Component Value Date    CR 0.73 05/26/2022    CR 0.53 06/05/2019     Lab Results   Component Value Date    GLC 94 05/26/2022     06/05/2019     INR:  Lab Results   Component Value Date    INR 0.94 05/15/2019

## 2023-01-11 NOTE — H&P
[facility]    History and Physical - Hospitalist Service       Date of Admission:  1/11/2023    Assessment & Plan      Danni Alvarez is a 89 year old female with multiple medical problems including dementia, lives in memory care, hypothyroidism, history of depression, prior colon cancer, S/P colectomy who presents to the emergency department after an unwitnessed fall at her facility.  Head CT shows acute right cerebral convexity subdural hematoma.  She is admitted for further evaluation and treatment    Principal Problem:    Fall    Subdural hematoma    Patient is normally able to ambulate fairly independently and her fall was unwitnessed, presumed to be a mechanical fall.    Admit as inpatient    Neurosurgery consult requested    Timing for repeat head CT's per neurosurgery    Hold aspirin and all anticoagulants    Per neurosurgery, goal SBP is less than 150 mmHg    Multiple other imaging tests have been ordered in ED to assess for additional injuries, most are pending    PT, OT, social work consults requested.  Anticipate she will need TCU stay before returning to memory care  Active Problems:    Subgaleal hemorrhage    Scalp laceration    Hold anticoagulants    Local cares    Cognitive impairment    Lives in memory care    Continue Aricept    She is obviously at high risk for developing delirium during the hospital stay due to existing cognitive impairment, new environment, CNS injury.      Continue Seroquel as needed.Monitor QT interval     Avoid benzodiazepines and antihistamines.    Delirium cares, including open window shades; helps define days and nights.     [place] the patient frequently with location, situation, date and time.    Avoid medical procedures andinteractions with staff during sleeping hours.     Early mobilization and minimal use of restraints; helps lower days of hospital stay.     Treat and monitor underlying infection, if any    Adequately treat pain.       "Hypothyroidism    Continue thyroid hormone       # DMII: A1C = 6.7 % (Ref range: <5.7 %) within past 3 months         Diet: NPO for Medical/Clinical Reasons Except for: No Exceptions    DVT Prophylaxis: Pneumatic Compression Devices  Alan Catheter: Not present  Lines: None     Cardiac Monitoring: None  Code Status:   DNR/DNI, see POLST    Clinically Significant Risk Factors Present on Admission                         Disposition Plan      Expected Discharge Date: 01/13/2023        Pending stable head CT, therapy evaluations complete, pain controlled, safe disposition available          Jazmin Shipley MD  Hospitalist Service  [facility]  Securely message with [employer] (more info)  Text page via [facility] Paging/Directory     ______________________________________________________________________    Chief Complaint   \"There are all kinds of problems.\"  (Patient offers this remark, unclear what she means.)    History is obtained in discussion with ED MD, Dr. Ramesh, also patient's daughter, Arely, who is at the bedside.  Patient unable to offer meaningful history because of advanced dementia    History of Present Illness   Danni Alvarez is a 89 year old female with multiple medical problems including dementia, lives in memory care, hypothyroidism, history of depression, prior colon cancer, S/P colectomy who presents to the emergency department after an unwitnessed fall at her facility.  Arely, her daughter, is at the bedside and offers additional history.    She says patient is typically able to walk from her room to the dining room for meals with minimal assistance.  She can also get on and off the toilet with minimal assistance from others.  Today, staff members found Danni down on the floor outside her room.  They called EMS who brought him to the emergency department for evaluation.      Here, she has right periorbital ecchymosis and dried blood on her scalp.  She also has significant " hematoma involving the right elbow and right knee.  Noncontrast head CT shows acute right cerebral convexity subdural hematoma measuring up to 12 mm in thickness with minimal midline shift.  There is a moderate size right frontal scalp subgaleal hematoma and evidence of a scalp laceration.  X-ray of the right forearm is negative for fracture.  Other scans to survey for additional injury including CT of the chest abdomen pelvis, CT of the facial bones and cervical spine are pending.    Patient was seen in the emergency department by neurosurgery.  Danni is remarkably awake, denying any pain.  She is admitted for further evaluation and treatment.      Past Medical History    Past Medical History:   Diagnosis Date     Abnormal feces      Abnormal glucose      Anxiety state, unspecified      Hyperlipidemia      Major depressive disorder, single episode, mild (H)      Malignant neoplasm of colon, unspecified site      Mild cognitive impairment, so stated      Unspecified hypothyroidism        Past Surgical History   Past Surgical History:   Procedure Laterality Date     CATARACT IOL, RT/LT      right and left     COLECTOMY       HERNIA REPAIR       REPAIR PTOSIS BILATERAL Bilateral 1/21/2016    Procedure: REPAIR PTOSIS BILATERAL;  Surgeon: Jose Stevens MD;  Location:  [facility]     TUBAL LIGATION         Prior to Admission Medications   Prior to Admission Medications   Prescriptions Last Dose Informant Patient Reported? Taking?   Donepezil HCl (ARICEPT PO)   Yes No   Sig: Take 10 mg by mouth At Bedtime   Levothyroxine Sodium (LEVOTHROID PO) 1/11/2023  Yes Yes   Sig: Take 50 mcg by mouth daily    QUEtiapine (SEROQUEL) 25 MG tablet Past Month  Yes Yes   Sig: Take 12.5 mg by mouth nightly as needed (agitation)   Vitamin D3 (CHOLECALCIFEROL) 25 mcg (1000 units) tablet 1/11/2023  Yes Yes   Sig: Take 75 mcg by mouth daily   acetaminophen (TYLENOL) 325 MG tablet 1/11/2023 at AM  Yes Yes   Sig: Take 650 mg by mouth 2 times  daily   aspirin (ASA) 81 MG chewable tablet 1/10/2023  Yes Yes   Sig: Take 81 mg by mouth daily   diclofenac (VOLTAREN) 1 % topical gel 1/11/2023  Yes Yes   Sig: Apply 2 g topically 2 times daily To R shoulder   senna-docusate (SENOKOT-S/PERICOLACE) 8.6-50 MG tablet Past Week  Yes Yes   Sig: Take 2 tablets by mouth Every Mon, Wed, Fri Morning   sertraline (ZOLOFT) 25 MG tablet 1/10/2023  Yes Yes   Sig: Take 75 mg by mouth daily      Facility-Administered Medications: None        Physical Exam   Vital Signs: Temp: 97.8  F (36.6  C) Temp src: Oral BP: (!) 154/89 Pulse: 80   Resp: 16 SpO2: 95 % O2 Device: None (Room air)    Weight: 140 lbs 0 oz    General Appearance: Frail elderly woman with extensive alopecia.  Eyes: Prominent right periorbital hematoma.  The sclera on the right eye has no redness  HEENT: Laceration just above the right lateral eyebrow.  No pain with palpation of the TMJs, patient says that her teeth occlude properly, no obvious tongue laceration or mouth ulcer  Respiratory: Lungs are clear to auscultation  Cardiovascular: Heart is regular rate and rhythm  GI: Abdomen soft nontender bowel sounds present  Skin: Skin is fragile, with multiple ecchymoses.  There is bilateral lower extremity edema  Musculoskeletal: Right elbow has an extensive hematoma.  She can flex and extend the right arm  Neurologic: She is awake and alert, oriented to self.  Follows very simple commands.  Moves both arms and both legs.  Psychiatric: Mood is very pleasant    Medical Decision Making       80 MINUTES SPENT BY ME on the date of service doing chart review, history, exam, documentation & further activities per the note.      Data     I have personally reviewed the following data over the past 24 hrs:    8.1  \   13.2   / 229     135 101 18 /  115 (H)   4.4 25 0.65 \       ALT: 26 AST: 23 AP: 93 TBILI: 0.5   ALB: 3.9 TOT PROTEIN: 7.2 LIPASE: N/A       INR:  0.95 PTT:  24   D-dimer:  N/A Fibrinogen:  N/A       Imaging  results reviewed over the past 24 hrs:   Recent Results (from the past 24 hour(s))   Head CT w/o contrast    Narrative    CT HEAD WITHOUT CONTRAST  1/11/2023 12:49 PM    INDICATION: Fall. Head injury. Dizzy.    TECHNIQUE: CT scan of the head without contrast. Dose reduction  techniques were used.  CONTRAST:  None.    COMPARISON: 8/5/2021 head CT.    FINDINGS: Mixed density predominantly hyperdense acute right cerebral  convexity subdural hematoma measuring up to 12 mm in greatest radial  thickness. This contributes to no significant midline shift. No  additional foci of intracranial hemorrhage elsewhere. Mild presumed  chronic small vessel ischemic changes. Moderate generalized volume  loss. The ventricles are proportional to the sulci. Moderate-sized  right frontal scalp subgaleal hematoma extending to the right  periorbital soft tissues. Accompanying small foci of air suggest  overlying scalp laceration. Paranasal sinuses and mastoid air cells  are clear. Remainder negative.       Impression    IMPRESSION:  1. Mixed density predominantly hyperdense/acute right cerebral  convexity subdural hematoma measuring up to 12 mm in thickness.  Associated midline shift is minimal.    2. No additional foci of intracranial hemorrhage elsewhere.    3. Moderate-sized right frontal scalp subgaleal hematoma extending to  the right periorbital soft tissues. Associated small foci of air  suggest an accompanying scalp laceration.    4. Background age-related changes as above.    Findings and impression discussed with Dr. Ramesh by phone at 1258 hours  on 1/11/2023.    KYLE STEPHENS MD         SYSTEM ID:  [id number]   Radius/Ulna XR, PA & LAT, right    Narrative    XR FOREARM RIGHT 2 VIEWS 1/11/2023 2:34 PM     HISTORY: fall, arm pain/swelling    COMPARISON: None.      Impression    IMPRESSION: Soft tissue swelling in the proximal forearm. Osteopenia.  No fractures are evident. Degenerative changes in the elbow and wrist.      EDUARDO OROURKE  MD MICHELLE         SYSTEM ID:  [id number]

## 2023-01-11 NOTE — ED NOTES
[facility]  ED Nurse Handoff Report    ED Chief complaint: Fall and Head Injury      ED Diagnosis:   Final diagnoses:   Subdural hemorrhage (H)       Code Status: DNR / DNI    Allergies:   Allergies   Allergen Reactions     Morphine        Patient Story: Pt comes in for unwitnessed fall at [facility], not on thinners per report. Pt has large R hematoma on forehead and on R elbow.   Focused Assessment:  Disoriented x3-4, baseline dementia, no difference. Denies pain, denies SOB. Neuro exam otherwise intact. Purewick in place, incontinent. SBP goal <150 for SDH. All other vitals stable on RA.     CT Chest/Abdomen/Pelvis w Contrast   Preliminary Result   IMPRESSION:    1.  Mildly displaced fractures of the right tenth and eleventh ribs   posterolaterally are age indeterminate, and could be acute. Please   clinically correlate.   2.  No other acute posttraumatic abnormality is identified in the   chest, abdomen, or pelvis.      CT Facial Bones without Contrast   Final Result   IMPRESSION:   1. No acute facial bone or mandibular fracture.   2. Moderate-sized right frontal scalp subgaleal hematoma with hematoma   extending to the lateral right periorbital soft tissues. Accompanying   tiny foci of air suggest an associated scalp laceration.      KYLE STEPHENS MD            SYSTEM ID:  [id number]      CT Cervical Spine w/o Contrast   Final Result   IMPRESSION:   1. No acute cervical spine fracture.      2. Osteopenia with degenerative change as above. No high-grade spinal   canal stenosis. Neural foraminal stenosis is described above.      KYLE STEPHENS MD            SYSTEM ID:  [id number]      Radius/Ulna XR, PA & LAT, right   Final Result   IMPRESSION: Soft tissue swelling in the proximal forearm. Osteopenia.   No fractures are evident. Degenerative changes in the elbow and wrist.         EDUARDO MARTINEZ MD            SYSTEM ID:  [id number]      Head CT w/o contrast   Final Result   IMPRESSION:   1. Mixed  density predominantly hyperdense/acute right cerebral   convexity subdural hematoma measuring up to 12 mm in thickness.   Associated midline shift is minimal.      2. No additional foci of intracranial hemorrhage elsewhere.      3. Moderate-sized right frontal scalp subgaleal hematoma extending to   the right periorbital soft tissues. Associated small foci of air   suggest an accompanying scalp laceration.      4. Background age-related changes as above.      Findings and impression discussed with Dr. Ramesh by phone at 1258 hours   on 1/11/2023.      KYLE STEPHENS MD            SYSTEM ID:  [id number]        Labs Ordered and Resulted from Time of ED Arrival to Time of ED Departure   COMPREHENSIVE METABOLIC PANEL - Abnormal       Result Value    Sodium 135      Potassium 4.4      Chloride 101      Carbon Dioxide (CO2) 25      Anion Gap 9      Urea Nitrogen 18      Creatinine 0.65      Calcium 9.2      Glucose 115 (*)     Alkaline Phosphatase 93      AST 23      ALT 26      Protein Total 7.2      Albumin 3.9      Bilirubin Total 0.5      GFR Estimate 84     ROUTINE UA WITH MICROSCOPIC REFLEX TO CULTURE - Abnormal    Color Urine Light Yellow      Appearance Urine Clear      Glucose Urine Negative      Bilirubin Urine Negative      Ketones Urine Trace (*)     Specific Gravity Urine 1.010      Blood Urine Negative      pH Urine 5.5      Protein Albumin Urine Negative      Urobilinogen Urine Normal      Nitrite Urine Negative      Leukocyte Esterase Urine Small (*)     Mucus Urine Present (*)     RBC Urine 1      WBC Urine 3      Squamous Epithelials Urine <1     INR - Normal    INR 0.95     PARTIAL THROMBOPLASTIN TIME - Normal    aPTT 24     TROPONIN I - Normal    Troponin I High Sensitivity 6     CBC WITH PLATELETS AND DIFFERENTIAL    WBC Count 8.1      RBC Count 4.23      Hemoglobin 13.2      Hematocrit 40.7      MCV 96      MCH 31.2      MCHC 32.4      RDW 13.7      Platelet Count 229      % Neutrophils 81      %  Lymphocytes 12      % Monocytes 5      % Eosinophils 1      % Basophils 0      % Immature Granulocytes 1      NRBCs per 100 WBC 0      Absolute Neutrophils 6.6      Absolute Lymphocytes 1.0      Absolute Monocytes 0.4      Absolute Eosinophils 0.1      Absolute Basophils 0.0      Absolute Immature Granulocytes 0.0      Absolute NRBCs 0.0     TYPE AND SCREEN, ADULT    ABO/RH(D) A POS      Antibody Screen Negative      SPECIMEN EXPIRATION DATE [id number]     ABO/RH TYPE AND SCREEN       Treatments and/or interventions provided: see MAR  Patient's response to treatments and/or interventions: continuing to assess    To be done/followed up on inpatient unit:  cont with admitting Md orders    Does this patient have any cognitive concerns?: Baseline dementia    Activity level - Baseline/Home:  Stand with Assist  Activity Level - Current:   Stand with Assist and Stand with assist x2    Patient's Preferred language: English   Needed?: No    Isolation: None  Infection: Not Applicable  Patient tested for COVID 19 prior to admission: YES  Bariatric?: No    Vital Signs:   Vitals:    01/11/23 1301 01/11/23 1500 01/11/23 1508 01/11/23 1545   BP: (!) 154/89 133/77 133/77 122/65   BP Location: Left arm      Patient Position: Supine      Pulse: 80 79 82 71   Resp: 16 20 19 14   Temp: 97.8  F (36.6  C)      TempSrc: Oral      SpO2: 95%   97%   Weight:       Height:           Cardiac Rhythm:     Was the PSS-3 completed:   Yes  What interventions are required if any?               Family Comments: daughter at bedside  OBS brochure/video discussed/provided to patient/family: No                  For the majority of the shift this patient's behavior was Green.   Behavioral interventions performed were reorientation     ED NURSE PHONE NUMBER: [phone]

## 2023-01-11 NOTE — ED TRIAGE NOTES
Patient here by EMS. Unwitnessed fall. Staff heard the fall and went to the patient's room. Patient was awake. Has dementia, no change from baseline. Lac, bruise and swelling above right eye.

## 2023-01-11 NOTE — ED NOTES
"Came in from CT. Daughter at bedside.   Noted large hematoma on right forehead and on the side of the right arm.   Blood pressure (!) 154/89, pulse 80, temperature 97.8  F (36.6  C), temperature source Oral, resp. rate 16, height 1.626 m (5' 4\"), weight 63.5 kg (140 lb), SpO2 95 %.      "

## 2023-01-12 ENCOUNTER — APPOINTMENT (OUTPATIENT)
Dept: PHYSICAL THERAPY | Facility: CLINIC | Age: 88
DRG: 086 | End: 2023-01-12
Attending: INTERNAL MEDICINE
Payer: COMMERCIAL

## 2023-01-12 ENCOUNTER — APPOINTMENT (OUTPATIENT)
Dept: OCCUPATIONAL THERAPY | Facility: CLINIC | Age: 88
DRG: 086 | End: 2023-01-12
Attending: INTERNAL MEDICINE
Payer: COMMERCIAL

## 2023-01-12 LAB
ANION GAP SERPL CALCULATED.3IONS-SCNC: 7 MMOL/L (ref 3–14)
BUN SERPL-MCNC: 13 MG/DL (ref 7–30)
CALCIUM SERPL-MCNC: 8.7 MG/DL (ref 8.5–10.1)
CHLORIDE BLD-SCNC: 104 MMOL/L (ref 94–109)
CO2 SERPL-SCNC: 24 MMOL/L (ref 20–32)
CREAT SERPL-MCNC: 0.52 MG/DL (ref 0.52–1.04)
ERYTHROCYTE [DISTWIDTH] IN BLOOD BY AUTOMATED COUNT: 13.9 % (ref 10–15)
GFR SERPL CREATININE-BSD FRML MDRD: 88 ML/MIN/1.73M2
GLUCOSE BLD-MCNC: 154 MG/DL (ref 70–99)
HCT VFR BLD AUTO: 35.8 % (ref 35–47)
HGB BLD-MCNC: 12 G/DL (ref 11.7–15.7)
MCH RBC QN AUTO: 31 PG (ref 26.5–33)
MCHC RBC AUTO-ENTMCNC: 33.5 G/DL (ref 31.5–36.5)
MCV RBC AUTO: 93 FL (ref 78–100)
PLATELET # BLD AUTO: 217 10E3/UL (ref 150–450)
POTASSIUM BLD-SCNC: 4 MMOL/L (ref 3.4–5.3)
RBC # BLD AUTO: 3.87 10E6/UL (ref 3.8–5.2)
SODIUM SERPL-SCNC: 135 MMOL/L (ref 133–144)
WBC # BLD AUTO: 6.6 10E3/UL (ref 4–11)

## 2023-01-12 PROCEDURE — 250N000011 HC RX IP 250 OP 636: Performed by: HOSPITALIST

## 2023-01-12 PROCEDURE — 85027 COMPLETE CBC AUTOMATED: CPT | Performed by: INTERNAL MEDICINE

## 2023-01-12 PROCEDURE — 120N000001 HC R&B MED SURG/OB

## 2023-01-12 PROCEDURE — 99232 SBSQ HOSP IP/OBS MODERATE 35: CPT | Performed by: HOSPITALIST

## 2023-01-12 PROCEDURE — 250N000013 HC RX MED GY IP 250 OP 250 PS 637: Performed by: INTERNAL MEDICINE

## 2023-01-12 PROCEDURE — 36415 COLL VENOUS BLD VENIPUNCTURE: CPT | Performed by: INTERNAL MEDICINE

## 2023-01-12 PROCEDURE — 97530 THERAPEUTIC ACTIVITIES: CPT | Mod: GP | Performed by: PHYSICAL THERAPIST

## 2023-01-12 PROCEDURE — 97530 THERAPEUTIC ACTIVITIES: CPT | Mod: GO

## 2023-01-12 PROCEDURE — 97116 GAIT TRAINING THERAPY: CPT | Mod: GP | Performed by: PHYSICAL THERAPIST

## 2023-01-12 PROCEDURE — 97165 OT EVAL LOW COMPLEX 30 MIN: CPT | Mod: GO

## 2023-01-12 PROCEDURE — 250N000013 HC RX MED GY IP 250 OP 250 PS 637: Performed by: HOSPITALIST

## 2023-01-12 PROCEDURE — 80048 BASIC METABOLIC PNL TOTAL CA: CPT | Performed by: INTERNAL MEDICINE

## 2023-01-12 PROCEDURE — 97162 PT EVAL MOD COMPLEX 30 MIN: CPT | Mod: GP | Performed by: PHYSICAL THERAPIST

## 2023-01-12 PROCEDURE — 97535 SELF CARE MNGMENT TRAINING: CPT | Mod: GO

## 2023-01-12 RX ORDER — NALOXONE HYDROCHLORIDE 0.4 MG/ML
0.2 INJECTION, SOLUTION INTRAMUSCULAR; INTRAVENOUS; SUBCUTANEOUS
Status: DISCONTINUED | OUTPATIENT
Start: 2023-01-12 | End: 2023-01-13 | Stop reason: HOSPADM

## 2023-01-12 RX ORDER — HALOPERIDOL 5 MG/ML
2 INJECTION INTRAMUSCULAR EVERY 6 HOURS PRN
Status: DISCONTINUED | OUTPATIENT
Start: 2023-01-12 | End: 2023-01-13 | Stop reason: HOSPADM

## 2023-01-12 RX ORDER — HYDROMORPHONE HCL IN WATER/PF 6 MG/30 ML
0.2 PATIENT CONTROLLED ANALGESIA SYRINGE INTRAVENOUS EVERY 6 HOURS PRN
Status: DISCONTINUED | OUTPATIENT
Start: 2023-01-12 | End: 2023-01-13 | Stop reason: HOSPADM

## 2023-01-12 RX ORDER — NALOXONE HYDROCHLORIDE 0.4 MG/ML
0.4 INJECTION, SOLUTION INTRAMUSCULAR; INTRAVENOUS; SUBCUTANEOUS
Status: DISCONTINUED | OUTPATIENT
Start: 2023-01-12 | End: 2023-01-13 | Stop reason: HOSPADM

## 2023-01-12 RX ORDER — QUETIAPINE FUMARATE 25 MG/1
25 TABLET, FILM COATED ORAL 3 TIMES DAILY PRN
Status: DISCONTINUED | OUTPATIENT
Start: 2023-01-12 | End: 2023-01-13 | Stop reason: HOSPADM

## 2023-01-12 RX ADMIN — ACETAMINOPHEN 650 MG: 325 TABLET, FILM COATED ORAL at 21:22

## 2023-01-12 RX ADMIN — QUETIAPINE 12.5 MG: 25 TABLET, FILM COATED ORAL at 04:39

## 2023-01-12 RX ADMIN — AMLODIPINE BESYLATE 5 MG: 5 TABLET ORAL at 11:52

## 2023-01-12 RX ADMIN — ACETAMINOPHEN 650 MG: 325 TABLET, FILM COATED ORAL at 11:52

## 2023-01-12 RX ADMIN — DONEPEZIL HYDROCHLORIDE 10 MG: 10 TABLET ORAL at 21:22

## 2023-01-12 RX ADMIN — HALOPERIDOL LACTATE 2 MG: 5 INJECTION, SOLUTION INTRAMUSCULAR at 16:45

## 2023-01-12 RX ADMIN — DICLOFENAC SODIUM 2 G: 10 GEL TOPICAL at 21:26

## 2023-01-12 RX ADMIN — TRAMADOL HYDROCHLORIDE 25 MG: 50 TABLET ORAL at 04:40

## 2023-01-12 RX ADMIN — SERTRALINE HYDROCHLORIDE 75 MG: 50 TABLET ORAL at 21:22

## 2023-01-12 RX ADMIN — QUETIAPINE FUMARATE 25 MG: 25 TABLET ORAL at 16:22

## 2023-01-12 RX ADMIN — DICLOFENAC SODIUM 2 G: 10 GEL TOPICAL at 11:52

## 2023-01-12 ASSESSMENT — ACTIVITIES OF DAILY LIVING (ADL)
FALL_HISTORY_WITHIN_LAST_SIX_MONTHS: YES
ADLS_ACUITY_SCORE: 43
TOILETING_ASSISTANCE: TOILETING DIFFICULTY, REQUIRES EQUIPMENT
DRESSING/BATHING_DIFFICULTY: YES
CHANGE_IN_FUNCTIONAL_STATUS_SINCE_ONSET_OF_CURRENT_ILLNESS/INJURY: YES
ADLS_ACUITY_SCORE: 45
ADLS_ACUITY_SCORE: 40
ADLS_ACUITY_SCORE: 43
ADLS_ACUITY_SCORE: 35
TOILETING_ISSUES: YES
ADLS_ACUITY_SCORE: 35
ADLS_ACUITY_SCORE: 35
TOILETING_MANAGEMENT: INCONTINENCE AT TIMES
ADLS_ACUITY_SCORE: 43
IADL_COMMENTS: DEPENDENT
WEAR_GLASSES_OR_BLIND: NO
ADLS_ACUITY_SCORE: 45
WALKING_OR_CLIMBING_STAIRS_DIFFICULTY: YES
DRESSING/BATHING: BATHING DIFFICULTY, ASSISTANCE 1 PERSON
NUMBER_OF_TIMES_PATIENT_HAS_FALLEN_WITHIN_LAST_SIX_MONTHS: 1
ADLS_ACUITY_SCORE: 35
DIFFICULTY_EATING/SWALLOWING: NO
CONCENTRATING,_REMEMBERING_OR_MAKING_DECISIONS_DIFFICULTY: YES
WALKING_OR_CLIMBING_STAIRS: AMBULATION DIFFICULTY, REQUIRES EQUIPMENT;AMBULATION DIFFICULTY, ASSISTANCE 1 PERSON
DOING_ERRANDS_INDEPENDENTLY_DIFFICULTY: YES
ADLS_ACUITY_SCORE: 35
EQUIPMENT_CURRENTLY_USED_AT_HOME: WALKER, ROLLING
ADLS_ACUITY_SCORE: 40

## 2023-01-12 NOTE — PROGRESS NOTES
01/12/23 1500   Appointment Info   Signing Clinician's Name / Credentials (OT) Amy Wayne, OTR/L   Living Environment   People in Home facility resident   Current Living Arrangements extended care facility;other (see comments)  (memory care)   Home Accessibility no concerns   Transportation Anticipated family or friend will provide   Living Environment Comments memory care resident. 1 level   Self-Care   Equipment Currently Used at Home walker, rolling   Activity/Exercise/Self-Care Comment inconsistent with use of 4WW. has had falls at home per daughter. pt unable to answer questions. alert ot first name. per familiy amb with CGA and VC for ADL sequencing   Instrumental Activities of Daily Living (IADL)   IADL Comments dependent   General Information   Onset of Illness/Injury or Date of Surgery 01/11/23   Referring Physician Jazmin Shipley MD   Patient/Family Therapy Goal Statement (OT) pt does not state goal.   Additional Occupational Profile Info/Pertinent History of Current Problem Danni Alvarez is a 89 year old female with multiple medical problems including dementia, lives in memory care, hypothyroidism, history of depression, prior colon cancer, S/P colectomy who presents to the emergency department after an unwitnessed fall at her facility.  Head CT shows acute right cerebral convexity subdural hematoma.  She is admitted for further evaluation and treatment   General Observations and Info daughter present in room   Cognitive Status Examination   Orientation Status person   Cognitive Status Comments follows 1 step VC and guestures about 25% of trials   Visual Perception   Impact of Vision Impairment on Function (Vision) unable to assess. large hematoma on R eye area   Sensory   Sensory Comments unable to assess   Pain Assessment   Patient Currently in Pain Yes, see Vital Sign flowsheet   Posture   Posture forward head position;protracted shoulders   Range of Motion Comprehensive   Comment,  General Range of Motion WFL for ADL B UE   Strength Comprehensive (MMT)   Comment, General Manual Muscle Testing (MMT) Assessment unable to follows for MMT. grossly deconditioned   Coordination   Upper Extremity Coordination No deficits were identified   Bed Mobility   Bed Mobility supine-sit   Supine-Sit [place] (Bed Mobility) supervision;verbal cues   Activities of Daily Living   BADL Assessment/Intervention upper body dressing;lower body dressing;grooming;feeding;toileting;bathing   Bathing Assessment/Intervention   [place] Level (Bathing) moderate assist (50% patient effort)   Upper Body Dressing Assessment/Training   [place] Level (Upper Body Dressing) minimum assist (75% patient effort)   Lower Body Dressing Assessment/Training   [place] Level (Lower Body Dressing) minimum assist (75% patient effort)   Grooming Assessment/Training   [place] Level (Grooming) minimum assist (75% patient effort)   Eating/Self Feeding   [place] Level (Feeding) minimum assist (75% patient effort)   Toileting   [place] Level (Toileting) minimum assist (75% patient effort)   Clinical Impression   Criteria for Skilled Therapeutic Interventions Met (OT) Yes, treatment indicated   OT Diagnosis decreased function in ADL   OT Problem List-Impairments impacting ADL problems related to;activity tolerance impaired;cognition;mobility;strength   Assessment of Occupational Performance 3-5 Performance Deficits   Identified Performance Deficits transfers, mobility, bathing, dressing, toileting   Planned Therapy Interventions (OT) ADL retraining;progressive activity/exercise   Clinical Decision Making Complexity (OT) low complexity   Risk & Benefits of therapy have been explained evaluation/treatment results reviewed;care plan/treatment goals reviewed;risks/benefits reviewed;current/potential barriers reviewed;participants voiced agreement with care plan;participants included;patient;daughter   Clinical  "Impression Comments decreased function in ADL warrants skilled IP OT tx   OT Total Evaluation Time   OT Eval, Low Complexity Minutes (25949) 20   OT Goals   Therapy Frequency (OT) 4 times/wk   OT Predicted Duration/Target Date for Goal Attainment 01/19/23   OT Goals Hygiene/Grooming;Upper Body Dressing;Lower Body Dressing;Transfers;Toilet Transfer/Toileting   OT: Hygiene/Grooming supervision/stand-by assist   OT: Upper Body Dressing Supervision/stand-by assist   OT: Lower Body Dressing Supervision/stand-by assist   OT: Transfer Supervision/stand-by assist  (walk in shower)   OT: Toilet Transfer/Toileting Supervision/stand-by assist   Interventions   Interventions Quick Adds Self-Care/Home Management;Therapeutic Activity   Self-Care/Home Management   Self-Care/Home Mgmt/ADL, Compensatory, Meal Prep Minutes (97580) 10   Symptoms Noted During/After Treatment (Meal Preparation/Planning Training) none   Treatment Detail/Skilled Intervention pt seen for OT tx session this date. guestures and 1 step commands used for mobility supine>Sit EOB and sit>Stand with FWW min A, TC for hand placement. amb into bathroom, abandons walker at toilet. VC and envir cue to stand at sink and brush teeth. requires min A to complete and sequencing in standing.end of session returned to supine, pt anxious and starting to panic with facial grimacing, holding ribs. adjusted then pt calmed. alarm on   Therapeutic Activities   Therapeutic Activity Minutes (58488) 10   Symptoms noted during/after treatment none   Treatment Detail/Skilled Intervention pt amb for 10 minutes in hallway with min A for walker mgmt, FWW, VC/TC for turns. pt frequently stopping, cue \"walk forward\" and pt follows with this intervention. gait belt used   OT Discharge Planning   OT Plan toileting, TB dressing, 1 step VC and gestures   OT Discharge Recommendation (DC Rec) home with home care occupational therapy;home with assist;Transitional Care Facility  (HH OT/PT)   OT " Rationale for DC Rec pt below baseline function in self care and ADL given deconditioning and weakness from fall. typically supervision with VC for mobility and ADL per daughter. currently min A for mobility and ADL. anticipate pt will do best in familiar envir thus recommend home to memory care with Ax1 for transfer, mobility and ADL with HH OT/PT. pt does best with 1 step commands and gestures toward goal (chair, toilet, etc). anticipate pt will likely remain fall risk given cognition. only oriented to self. if this level of assist not avaliable then rec TCU   OT Brief overview of current status Ax1. use one step commands   Total Session Time   Timed Code Treatment Minutes 20   Total Session Time (sum of timed and untimed services) 40

## 2023-01-12 NOTE — PROGRESS NOTES
[facility]    Medicine Progress Note - Hospitalist Service    Date of Admission:  1/11/2023    Assessment & Plan      Danni Alvarez is a 89 year old female with multiple medical problems including dementia, lives in memory care, hypothyroidism, history of depression, prior colon cancer, S/P colectomy who presents to the emergency department after an unwitnessed fall at her facility.  Head CT shows acute right cerebral convexity subdural hematoma.  She is admitted for further evaluation and treatment    Principal Problem:    Fall    Subdural hematoma    Patient is normally able to ambulate fairly independently and her fall was unwitnessed, presumed to be a mechanical fall.    Neurosurgery consult     Hold aspirin and all anticoagulants    PT, OT, social work consults  Active Problems:    Subgaleal hemorrhage    Scalp laceration    Hold anticoagulants    Cognitive impairment    Continue Aricept    Continue Seroquel     Delirium cares      Hypothyroidism    Continue thyroid hormone        DVT Prophylaxis: Pneumatic Compression Devices       Diet: Combination Diet Regular Diet Adult      Alan Catheter: Not present  Lines: None     Cardiac Monitoring: ACTIVE order. Indication: fall, evaluate for arrhythmia  Code Status: No CPR- Do NOT Intubate      Clinically Significant Risk Factors Present on Admission                      # DMII: A1C = 6.7 % (Ref range: <5.7 %) within past 3 months            Disposition Plan      Expected Discharge Date: 01/13/2023                  Nirmal Gibbons DO  Hospitalist Service  [facility]  Securely message with [employer] (more info)  Text page via [email or web address] Paging/Directory   ______________________________________________________________________    Interval History   Patient states that she is doing okay, updated on plan of care.    Physical Exam   Vital Signs: Temp: 97.8  F (36.6  C) Temp src: Oral BP: 128/68 Pulse: 80   Resp: 16  SpO2: 93 % O2 Device: None (Room air)    Weight: 140 lbs 0 oz    General Appearance: Frail elderly woman with extensive alopecia.  Lying comfortably in bed  Eyes: Prominent right periorbital hematoma.  The sclera on the right eye has no redness, pupil reactive and equal.  HEENT: Laceration just above the right lateral eyebrow.    Respiratory: Lungs are clear to auscultation  Cardiovascular: Heart is regular rate   GI: Abdomen soft nontender bowel sounds present  Skin: Skin is fragile, with multiple ecchymoses.  There is bilateral lower extremity edema  Musculoskeletal: Right elbow has an extensive hematoma.    Distal CMS intact.  Neurologic: She is awake and alert, follows commands but is confused  Psychiatric: Mood is pleasant    Medical Decision Making       40 MINUTES SPENT BY ME on the date of service doing chart review, history, exam, documentation & further activities per the note.      Data     I have personally reviewed the following data over the past 24 hrs:    6.6  \   12.0   / 217     135 104 13 /  154 (H)   4.0 24 0.52 \       ALT: 26 AST: 23 AP: 93 TBILI: 0.5   ALB: 3.9 TOT PROTEIN: 7.2 LIPASE: N/A       INR:  0.95 PTT:  24   D-dimer:  N/A Fibrinogen:  N/A       Imaging results reviewed over the past 24 hrs:   Recent Results (from the past 24 hour(s))   Head CT w/o contrast    Narrative    CT HEAD WITHOUT CONTRAST  1/11/2023 12:49 PM    INDICATION: Fall. Head injury. Dizzy.    TECHNIQUE: CT scan of the head without contrast. Dose reduction  techniques were used.  CONTRAST:  None.    COMPARISON: 8/5/2021 head CT.    FINDINGS: Mixed density predominantly hyperdense acute right cerebral  convexity subdural hematoma measuring up to 12 mm in greatest radial  thickness. This contributes to no significant midline shift. No  additional foci of intracranial hemorrhage elsewhere. Mild presumed  chronic small vessel ischemic changes. Moderate generalized volume  loss. The ventricles are proportional to the sulci.  Moderate-sized  right frontal scalp subgaleal hematoma extending to the right  periorbital soft tissues. Accompanying small foci of air suggest  overlying scalp laceration. Paranasal sinuses and mastoid air cells  are clear. Remainder negative.       Impression    IMPRESSION:  1. Mixed density predominantly hyperdense/acute right cerebral  convexity subdural hematoma measuring up to 12 mm in thickness.  Associated midline shift is minimal.    2. No additional foci of intracranial hemorrhage elsewhere.    3. Moderate-sized right frontal scalp subgaleal hematoma extending to  the right periorbital soft tissues. Associated small foci of air  suggest an accompanying scalp laceration.    4. Background age-related changes as above.    Findings and impression discussed with Dr. Ramesh by phone at 1258 hours  on 1/11/2023.    KYLE STEPHENS MD         SYSTEM ID:  [id number]   Radius/Ulna XR, PA & LAT, right    Narrative    XR FOREARM RIGHT 2 VIEWS 1/11/2023 2:34 PM     HISTORY: fall, arm pain/swelling    COMPARISON: None.      Impression    IMPRESSION: Soft tissue swelling in the proximal forearm. Osteopenia.  No fractures are evident. Degenerative changes in the elbow and wrist.      EDUARDO MARTINEZ MD         SYSTEM ID:  [id number]   CT Cervical Spine w/o Contrast    Narrative    CT CERVICAL SPINE WITHOUT CONTRAST  1/11/2023 3:13 PM    INDICATION: Fall, head injury. Neck trauma.    TECHNIQUE: CT scan of the cervical spine without contrast. Dose  reduction techniques were used.  COMPARISON: None.    FINDINGS: No acute cervical spine fracture. Rightward curvature to the  lower cervical and visualized upper thoracic spine. 3 mm  anterolisthesis of C3 on C4. Rightward curvature to the lower cervical  and visualized upper thoracic spine. Slight kyphosis spans the  cervical spine. Severe atlantodental degenerative change. Moderate  C4-C5 degenerative disc disease. Severe C5-C6 and moderate C6-C7  degenerative disc disease.  Moderate to severe C7-T1 degenerative disc  disease. Moderate right C3-C4 facet arthropathy. Severe left C3-C4 and  moderate left C2-C3 and C4-C5 facet arthropathy. Multilevel mild  spinal canal stenosis. Mild to moderate left C4-C5 and mild bilateral  C5-C6 neural foraminal stenosis. Scattered vascular calcification.  Lung apices are not well visualized.      Impression    IMPRESSION:  1. No acute cervical spine fracture.    2. Osteopenia with degenerative change as above. No high-grade spinal  canal stenosis. Neural foraminal stenosis is described above.    KYLE STEPHENS MD         SYSTEM ID:  [id number]   CT Facial Bones without Contrast    Narrative    CT FACIAL BONES WITHOUT CONTRAST  1/11/2023 3:14 PM    INDICATION: Fall, facial injury.    TECHNIQUE: CT scan of the face without contrast. Dose reduction  techniques were used.  CONTRAST: None.    COMPARISON: 1/11/2023 head CT.    FINDINGS:  Moderate-sized right frontal scalp subgaleal hematoma with  hematoma extending to the lateral right periorbital soft tissues. A  few small foci of accompanying air suggest an associated scalp  laceration. No identifiable facial bone or mandibular fracture.  Degenerative changes of the bilateral temporomandibular joints.  Bilateral temporomandibular joints are well seated. Postoperative  changes to the bilateral lenses. Redemonstration of the patient's  known right cerebral convexity subdural hematoma.      Impression    IMPRESSION:  1. No acute facial bone or mandibular fracture.  2. Moderate-sized right frontal scalp subgaleal hematoma with hematoma  extending to the lateral right periorbital soft tissues. Accompanying  tiny foci of air suggest an associated scalp laceration.    KYLE STEPHENS MD         SYSTEM ID:  [id number]   CT Chest/Abdomen/Pelvis w Contrast    Narrative    CT CHEST/ABDOMEN/PELVIS WITH CONTRAST 1/11/2023 3:14 PM    CLINICAL HISTORY: Blunt trauma. Fall. Dementia. Altered mental status.    TECHNIQUE: CT  scan of the chest, abdomen, and pelvis was performed  following injection of IV contrast. Multiplanar reformats were  obtained. Dose reduction techniques were used.   CONTRAST: 71 mL [id number]    COMPARISON: Chest CT performed 5/15/2019. CT of the abdomen and pelvis  performed 12/11/2006.    FINDINGS:   LUNGS AND PLEURA: No pleural effusions. No pneumothorax. Mild  atelectasis at both lung bases posteriorly. No lung masses or  consolidations.    MEDIASTINUM/AXILLAE: No enlarged lymph nodes in the chest. No  pericardial effusion.    CORONARY ARTERY CALCIFICATION: Moderate.    HEPATOBILIARY: Unremarkable. No hepatic masses are seen.    PANCREAS: Normal.    SPLEEN: Normal.    ADRENAL GLANDS: Normal.    KIDNEYS/BLADDER: Unremarkable. No hydronephrosis.    BOWEL: Rectosigmoid anastomosis. No bowel obstruction. No evidence for  colitis or diverticulitis.    PELVIC ORGANS: Uterine fibroids. No free fluid in the pelvis.    LYMPH NODES: No enlarged lymph nodes are identified in the abdomen or  pelvis.    VASCULATURE: Mild atherosclerotic aortoiliac calcification.    ADDITIONAL FINDINGS: None.    MUSCULOSKELETAL: Old bilateral inferior and superior pubic rami  fractures. Degenerative changes throughout the thoracolumbar spine and  within the right glenohumeral joint. There are numerous old right rib  fractures. Mildly displaced fractures of the right tenth and eleventh  ribs posterolaterally are age-indeterminate, and could be acute. Mild  thoracic curve.      Impression    IMPRESSION:   1.  Mildly displaced fractures of the right tenth and eleventh ribs  posterolaterally are age-indeterminate, and could be acute. Please  clinically correlate.  2.  No other acute posttraumatic abnormality is identified in the  chest, abdomen, or pelvis.    FARIDA SWAIN MD         SYSTEM ID:  [id number]   CT Head w/o Contrast    Narrative    EXAM: CT HEAD W/O CONTRAST  LOCATION: [facility]  DATE/TIME: 1/11/2023  7:03 PM    INDICATION: SDH follow up  COMPARISON: 01/11/2023  TECHNIQUE: Routine CT Head without IV contrast. Multiplanar reformats. Dose reduction techniques were used.    FINDINGS:  INTRACRANIAL CONTENTS: No significant interval change in the right convexity subdural hematoma measuring up to 10 mm in maximal width posteriorly. Mass effect is unchanged with partial effacement of sulci in the right cerebral hemisphere and partial   effacement of the occipital horn of the right lateral ventricle. No midline shift. No CT evidence of acute infarct. Mild presumed chronic small vessel ischemic changes. Moderate generalized volume loss. No hydrocephalus.     VISUALIZED ORBITS/SINUSES/MASTOIDS: No intraorbital abnormality. No paranasal sinus mucosal disease. No middle ear or mastoid effusion.    BONES/SOFT TISSUES: Unchanged right supraorbital soft tissue hematoma and laceration.      Impression    IMPRESSION:  1.  Unchanged right convexity subdural hematoma and mild locoregional mass effect.  2.  No new or worsening intracranial blood products. No midline shift.  3.  Right supraorbital soft tissue hematoma and laceration as before.

## 2023-01-12 NOTE — CONSULTS
Care Management Initial Consult    General Information  Assessment completed with: VM-chart review, Children, Arely- Dtr       Primary Care Provider verified and updated as needed: Yes   Readmission within the last 30 days:        Reason for Consult: discharge planning  Advance Care Planning:            Communication Assessment  Patient's communication style: spoken language (English or Bilingual)             Cognitive  Cognitive/Neuro/Behavioral: .WDL except, orientation  Level of Consciousness: confused  Arousal Level: arouses to voice  Orientation: disoriented to, place, time, situation  Mood/Behavior: calm, cooperative  Best Language: 0 - No aphasia  Speech: logical    Living Environment:   People in home: facility resident     Current living Arrangements:        Able to return to prior arrangements: other (see comments) (await call from facility)       Family/Social Support:  Care provided by: other (see comments), homecare agency  Provides care for: no one, unable/limited ability to care for self  Marital Status:              Description of Support System:           Current Resources:   Patient receiving home care services: Yes  Skilled Home Care Services: Skilled Nursing, Home Health Aid  Community Resources:    Equipment currently used at home: walker, rolling  Supplies currently used at home:      Employment/Financial:  Employment Status: retired        Financial Concerns:             Lifestyle & Psychosocial Needs:  Social Determinants of Health     Tobacco Use: Not on file   Alcohol Use: Not on file   Financial Resource Strain: Not on file   Food Insecurity: Not on file   Transportation Needs: Not on file   Physical Activity: Not on file   Stress: Not on file   Social Connections: Not on file   Intimate Partner Violence: Not on file   Depression: Not on file   Housing Stability: Not on file       Functional Status:  Prior to admission patient needed assistance:              Mental Health Status:           Chemical Dependency Status:                Values/Beliefs:  Spiritual, Cultural Beliefs, Voodoo Practices, Values that affect care:                 Additional Information:  Met with daughter Arely at bedside to discuss role in discharge planning.    Pt lives at [facility].  Arely reports that patient is indep at baseline (is supposed to use a walker but often doesn't.  Receives minimal assist w/ dressing. Help with bathing, medications, cutting up food.  Arely doesn't think Pt is open to HHC currently (states she was open to it previously, doesn't remember agency)  Arely is okay with patient returning to [facility] (wants to make sure RN's are prepared for her return and what to watch for)  Family can transport at discharge.     CC called [facility] to check on services, ability to return  Left message for Jessica ( [phone])    CC will follow for discharge back to [facility].  Need to arrange HHC (check agency preference)      Michelle Miranda RN

## 2023-01-12 NOTE — PROGRESS NOTES
Patient alert to self only, difficulty with redirection. Impulsive at times. Difficulty with finding words. Denies pain. Able to get up with assist of 1 with gaitbelt and walker. Walked in halls with therapy. Right side hematoma present on right head and face, right arm, right elbow, right hip and thigh.

## 2023-01-12 NOTE — PROGRESS NOTES
[facility]    Neurosurgery  Daily Note    Assessment & Plan   Danni Alvarez is an 89 year old female with history of dementia who was admitted on 1/11/2023 after an unwitnessed fall at her nursing home. Imaging shows an acute right cerebral convexity subdural hematoma measuring up to 12 mm in thickness, no significant midline shift  Repeat head CT stable.   Alert and following commands, no headache, no n/v. Eating lunch without issues.    Plan:  -Advance activity as tolerated  -Continue supportive and symptomatic treatment  -Start or continue physical therapy  -no surgical indications.   Follow-up in 2 weeks with head CT prior.   NSG will sign off, please contact us with questions.     [phone] for appts.     Fabian Cline PA-C    Interval History   Stable.  Doing well.  Improving slowly.  Pain is reasonably controlled.  No fevers.    Physical Exam       BP: 107/58 Pulse: 80   Resp: 16 SpO2: 93 % O2 Device: None (Room air)    Vitals:    01/11/23 1300   Weight: 140 lb (63.5 kg)     Vital Signs with Ranges  Pulse:  [68-90] 80  Resp:  [10-27] 16  BP: ()/() 107/58  SpO2:  [91 %-97 %] 93 %  I/O last 3 completed shifts:  In: 150 [P.O.:150]  Out: 150 [Urine:150]    Alert and confused, follows some simple commands. CABELLO equally.  Moves all extremities equally.        Medications        acetaminophen  650 mg Oral BID     amLODIPine  5 mg Oral Daily     diclofenac  2 g Topical BID     donepezil  10 mg Oral At Bedtime     levothyroxine  50 mcg Oral Daily     [START ON 1/13/2023] senna-docusate  2 tablet Oral Q Mon Wed Fri AM     sertraline  75 mg Oral QPM     sodium chloride (PF)  3 mL Intracatheter Q8H           Fabian Cline PA-C  [facility] Neurosurgery  [address]  [address] [place] [id number]    Tel [phone]  Pager [phone]

## 2023-01-13 VITALS
SYSTOLIC BLOOD PRESSURE: 124 MMHG | TEMPERATURE: 99.2 F | HEIGHT: 64 IN | BODY MASS INDEX: 23.9 KG/M2 | OXYGEN SATURATION: 93 % | WEIGHT: 140 LBS | HEART RATE: 90 BPM | RESPIRATION RATE: 16 BRPM | DIASTOLIC BLOOD PRESSURE: 64 MMHG

## 2023-01-13 LAB
ANION GAP SERPL CALCULATED.3IONS-SCNC: 9 MMOL/L (ref 3–14)
BUN SERPL-MCNC: 16 MG/DL (ref 7–30)
CALCIUM SERPL-MCNC: 8.6 MG/DL (ref 8.5–10.1)
CHLORIDE BLD-SCNC: 103 MMOL/L (ref 94–109)
CO2 SERPL-SCNC: 25 MMOL/L (ref 20–32)
CREAT SERPL-MCNC: 0.62 MG/DL (ref 0.52–1.04)
ERYTHROCYTE [DISTWIDTH] IN BLOOD BY AUTOMATED COUNT: 14.2 % (ref 10–15)
GFR SERPL CREATININE-BSD FRML MDRD: 85 ML/MIN/1.73M2
GLUCOSE BLD-MCNC: 121 MG/DL (ref 70–99)
HCT VFR BLD AUTO: 37.8 % (ref 35–47)
HGB BLD-MCNC: 12.7 G/DL (ref 11.7–15.7)
MCH RBC QN AUTO: 31.2 PG (ref 26.5–33)
MCHC RBC AUTO-ENTMCNC: 33.6 G/DL (ref 31.5–36.5)
MCV RBC AUTO: 93 FL (ref 78–100)
PLATELET # BLD AUTO: 237 10E3/UL (ref 150–450)
POTASSIUM BLD-SCNC: 4.1 MMOL/L (ref 3.4–5.3)
RBC # BLD AUTO: 4.07 10E6/UL (ref 3.8–5.2)
SODIUM SERPL-SCNC: 137 MMOL/L (ref 133–144)
WBC # BLD AUTO: 7.2 10E3/UL (ref 4–11)

## 2023-01-13 PROCEDURE — 36415 COLL VENOUS BLD VENIPUNCTURE: CPT | Performed by: HOSPITALIST

## 2023-01-13 PROCEDURE — 85018 HEMOGLOBIN: CPT | Performed by: HOSPITALIST

## 2023-01-13 PROCEDURE — 80048 BASIC METABOLIC PNL TOTAL CA: CPT | Performed by: HOSPITALIST

## 2023-01-13 PROCEDURE — 99239 HOSP IP/OBS DSCHRG MGMT >30: CPT | Performed by: HOSPITALIST

## 2023-01-13 PROCEDURE — 250N000013 HC RX MED GY IP 250 OP 250 PS 637: Performed by: HOSPITALIST

## 2023-01-13 PROCEDURE — 250N000013 HC RX MED GY IP 250 OP 250 PS 637: Performed by: INTERNAL MEDICINE

## 2023-01-13 RX ORDER — AMLODIPINE BESYLATE 5 MG/1
5 TABLET ORAL DAILY
Qty: 30 TABLET | Refills: 0 | Status: ON HOLD | OUTPATIENT
Start: 2023-01-14 | End: 2024-08-06

## 2023-01-13 RX ADMIN — DICLOFENAC SODIUM 2 G: 10 GEL TOPICAL at 08:59

## 2023-01-13 RX ADMIN — LEVOTHYROXINE SODIUM 50 MCG: 50 TABLET ORAL at 08:49

## 2023-01-13 RX ADMIN — QUETIAPINE FUMARATE 25 MG: 25 TABLET ORAL at 10:31

## 2023-01-13 RX ADMIN — ACETAMINOPHEN 650 MG: 325 TABLET, FILM COATED ORAL at 08:49

## 2023-01-13 RX ADMIN — AMLODIPINE BESYLATE 5 MG: 5 TABLET ORAL at 08:48

## 2023-01-13 RX ADMIN — SENNOSIDES AND DOCUSATE SODIUM 2 TABLET: 50; 8.6 TABLET ORAL at 08:49

## 2023-01-13 ASSESSMENT — ACTIVITIES OF DAILY LIVING (ADL)
ADLS_ACUITY_SCORE: 43
ADLS_ACUITY_SCORE: 43
ADLS_ACUITY_SCORE: 47
ADLS_ACUITY_SCORE: 43
ADLS_ACUITY_SCORE: 43
ADLS_ACUITY_SCORE: 47
ADLS_ACUITY_SCORE: 43

## 2023-01-13 NOTE — DISCHARGE SUMMARY
[facility]  Hospitalist Discharge Summary      Date of Admission:  1/11/2023  Date of Discharge:  1/13/2023  Discharging Provider: Nirmal Gibbons DO  Discharge Service: Hospitalist Service    Discharge Diagnoses   See below    Follow-ups Needed After Discharge   Follow-up Appointments     Follow-up and recommended labs and tests       2 [facility]    [phone] for appts.         Follow-up and recommended labs and tests       Follow up with primary care provider, CHARMAINE GOODMAN, within 7 days for   hospital follow- up.  The following labs/tests are recommended: cbc/cmp.    Follow up with Neurosurgery as directed.             Unresulted Labs Ordered in the Past 30 Days of this Admission     No orders found from 12/12/2022 to 1/12/2023.      These results will be followed up by pcp    Discharge Disposition   Discharged to prior to admission setting.   Condition at discharge: Guarded      Hospital Course   Danni Alvarez is a 89 year old female with multiple medical problems including dementia, lives in memory care, hypothyroidism, history of depression, prior colon cancer, S/P colectomy who presents to the emergency department after an unwitnessed fall at her facility.  Head CT shows acute right cerebral convexity subdural hematoma.  She is admitted for further evaluation and treatment    Principal Problem:    Fall    Subdural hematoma    Neurosurgery consulted during inpatient stay, close outpatient follow     Hold aspirin and all anticoagulants    PT, OT, and supports  Active Problems:    Subgaleal hemorrhage    Scalp laceration    Hold anticoagulants    Cognitive impairment    Continue Aricept    Continue Seroquel       Hypothyroidism    Continue thyroid hormone            Consultations This Hospital Stay   CARE MANAGEMENT / SOCIAL WORK IP CONSULT  PHYSICAL THERAPY ADULT IP CONSULT  OCCUPATIONAL THERAPY ADULT IP CONSULT  NEUROSURGERY IP  CONSULT  SOCIAL WORK IP CONSULT  CARE MANAGEMENT / SOCIAL WORK IP CONSULT    Code Status   No CPR- Do NOT Intubate    Time Spent on this Encounter   I, Nirmal Gibbons DO, personally saw the patient today and spent greater than 30 minutes discharging this patient.       Nirmal Gibbons DO  [facility]  [address] [place] [address]  Phone: [phone]  ______________________________________________________________________    Physical Exam   Vital Signs: Temp: 99.2  F (37.3  C) Temp src: Oral BP: 124/64 Pulse: 90   Resp: 16 SpO2: 93 % O2 Device: None (Room air)    Weight: 140 lbs 0 oz    General Appearance: Frail elderly woman.  Lying comfortably in bed. Confused.   Eyes: Prominent right periorbital hematoma.  The sclera on the right eye has no redness, pupil reactive and equal.  HEENT: Laceration just above the right lateral eyebrow. Stable.    Respiratory: Lungs are clear to auscultation  Cardiovascular: Heart is regular rate   GI: Abdomen soft nontender bowel sounds present  Skin: Skin is fragile, with multiple ecchymoses.  There is bilateral lower extremity edema, improving.   Musculoskeletal: Right elbow has an extensive hematoma, stable.    Distal CMS intact.  Neurologic: She is awake and alert, follows commands but is confused  Psychiatric: Mood is pleasant       Primary Care Physician   CHARMAINE GOODMAN    Discharge Orders      CT Head w/o contrast*     Home Care Referral      Follow-up and recommended labs and tests     [facility]    [phone] for appts.     Reason for your hospital stay    Head trauma     Follow-up and recommended labs and tests     Follow up with primary care provider, CHARMAINE GOODMAN, within 7 days for hospital follow- up.  The following labs/tests are recommended: cbc/cmp.    Follow up with Neurosurgery as directed.     Activity    Your activity upon discharge: activity as tolerated     Diet     Follow this diet upon discharge: Orders Placed This Encounter      Combination Diet Regular Diet Adult       Significant Results and Procedures   Results for orders placed or performed during the hospital encounter of 01/11/23   Head CT w/o contrast    Narrative    CT HEAD WITHOUT CONTRAST  1/11/2023 12:49 PM    INDICATION: Fall. Head injury. Dizzy.    TECHNIQUE: CT scan of the head without contrast. Dose reduction  techniques were used.  CONTRAST:  None.    COMPARISON: 8/5/2021 head CT.    FINDINGS: Mixed density predominantly hyperdense acute right cerebral  convexity subdural hematoma measuring up to 12 mm in greatest radial  thickness. This contributes to no significant midline shift. No  additional foci of intracranial hemorrhage elsewhere. Mild presumed  chronic small vessel ischemic changes. Moderate generalized volume  loss. The ventricles are proportional to the sulci. Moderate-sized  right frontal scalp subgaleal hematoma extending to the right  periorbital soft tissues. Accompanying small foci of air suggest  overlying scalp laceration. Paranasal sinuses and mastoid air cells  are clear. Remainder negative.       Impression    IMPRESSION:  1. Mixed density predominantly hyperdense/acute right cerebral  convexity subdural hematoma measuring up to 12 mm in thickness.  Associated midline shift is minimal.    2. No additional foci of intracranial hemorrhage elsewhere.    3. Moderate-sized right frontal scalp subgaleal hematoma extending to  the right periorbital soft tissues. Associated small foci of air  suggest an accompanying scalp laceration.    4. Background age-related changes as above.    Findings and impression discussed with Dr. Ramesh by phone at 1258 hours  on 1/11/2023.    KYLE STEPHENS MD         SYSTEM ID:  [id number]   CT Cervical Spine w/o Contrast    Narrative    CT CERVICAL SPINE WITHOUT CONTRAST  1/11/2023 3:13 PM    INDICATION: Fall, head injury. Neck trauma.    TECHNIQUE: CT scan of the  cervical spine without contrast. Dose  reduction techniques were used.  COMPARISON: None.    FINDINGS: No acute cervical spine fracture. Rightward curvature to the  lower cervical and visualized upper thoracic spine. 3 mm  anterolisthesis of C3 on C4. Rightward curvature to the lower cervical  and visualized upper thoracic spine. Slight kyphosis spans the  cervical spine. Severe atlantodental degenerative change. Moderate  C4-C5 degenerative disc disease. Severe C5-C6 and moderate C6-C7  degenerative disc disease. Moderate to severe C7-T1 degenerative disc  disease. Moderate right C3-C4 facet arthropathy. Severe left C3-C4 and  moderate left C2-C3 and C4-C5 facet arthropathy. Multilevel mild  spinal canal stenosis. Mild to moderate left C4-C5 and mild bilateral  C5-C6 neural foraminal stenosis. Scattered vascular calcification.  Lung apices are not well visualized.      Impression    IMPRESSION:  1. No acute cervical spine fracture.    2. Osteopenia with degenerative change as above. No high-grade spinal  canal stenosis. Neural foraminal stenosis is described above.    KYLE STEPHENS MD         SYSTEM ID:  [id number]   CT Facial Bones without Contrast    Narrative    CT FACIAL BONES WITHOUT CONTRAST  1/11/2023 3:14 PM    INDICATION: Fall, facial injury.    TECHNIQUE: CT scan of the face without contrast. Dose reduction  techniques were used.  CONTRAST: None.    COMPARISON: 1/11/2023 head CT.    FINDINGS:  Moderate-sized right frontal scalp subgaleal hematoma with  hematoma extending to the lateral right periorbital soft tissues. A  few small foci of accompanying air suggest an associated scalp  laceration. No identifiable facial bone or mandibular fracture.  Degenerative changes of the bilateral temporomandibular joints.  Bilateral temporomandibular joints are well seated. Postoperative  changes to the bilateral lenses. Redemonstration of the patient's  known right cerebral convexity subdural hematoma.       Impression    IMPRESSION:  1. No acute facial bone or mandibular fracture.  2. Moderate-sized right frontal scalp subgaleal hematoma with hematoma  extending to the lateral right periorbital soft tissues. Accompanying  tiny foci of air suggest an associated scalp laceration.    KYLE STEPHENS MD         SYSTEM ID:  [id number]   CT Chest/Abdomen/Pelvis w Contrast    Narrative    CT CHEST/ABDOMEN/PELVIS WITH CONTRAST 1/11/2023 3:14 PM    CLINICAL HISTORY: Blunt trauma. Fall. Dementia. Altered mental status.    TECHNIQUE: CT scan of the chest, abdomen, and pelvis was performed  following injection of IV contrast. Multiplanar reformats were  obtained. Dose reduction techniques were used.   CONTRAST: 71 mL Isovue-370    COMPARISON: Chest CT performed 5/15/2019. CT of the abdomen and pelvis  performed 12/11/2006.    FINDINGS:   LUNGS AND PLEURA: No pleural effusions. No pneumothorax. Mild  atelectasis at both lung bases posteriorly. No lung masses or  consolidations.    MEDIASTINUM/AXILLAE: No enlarged lymph nodes in the chest. No  pericardial effusion.    CORONARY ARTERY CALCIFICATION: Moderate.    HEPATOBILIARY: Unremarkable. No hepatic masses are seen.    PANCREAS: Normal.    SPLEEN: Normal.    ADRENAL GLANDS: Normal.    KIDNEYS/BLADDER: Unremarkable. No hydronephrosis.    BOWEL: Rectosigmoid anastomosis. No bowel obstruction. No evidence for  colitis or diverticulitis.    PELVIC ORGANS: Uterine fibroids. No free fluid in the pelvis.    LYMPH NODES: No enlarged lymph nodes are identified in the abdomen or  pelvis.    VASCULATURE: Mild atherosclerotic aortoiliac calcification.    ADDITIONAL FINDINGS: None.    MUSCULOSKELETAL: Old bilateral inferior and superior pubic rami  fractures. Degenerative changes throughout the thoracolumbar spine and  within the right glenohumeral joint. There are numerous old right rib  fractures. Mildly displaced fractures of the right tenth and eleventh  ribs posterolaterally are  age-indeterminate, and could be acute. Mild  thoracic curve.      Impression    IMPRESSION:   1.  Mildly displaced fractures of the right tenth and eleventh ribs  posterolaterally are age-indeterminate, and could be acute. Please  clinically correlate.  2.  No other acute posttraumatic abnormality is identified in the  chest, abdomen, or pelvis.    FARIDA SWAIN MD         SYSTEM ID:  [id number]   Radius/Ulna XR, PA & LAT, right    Narrative    XR FOREARM RIGHT 2 VIEWS 1/11/2023 2:34 PM     HISTORY: fall, arm pain/swelling    COMPARISON: None.      Impression    IMPRESSION: Soft tissue swelling in the proximal forearm. Osteopenia.  No fractures are evident. Degenerative changes in the elbow and wrist.      EDUARDO MARTINEZ MD         SYSTEM ID:  [id number]   CT Head w/o Contrast    Narrative    EXAM: CT HEAD W/O CONTRAST  LOCATION: [facility]  DATE/TIME: 1/11/2023 7:03 PM    INDICATION: SDH follow up  COMPARISON: 01/11/2023  TECHNIQUE: Routine CT Head without IV contrast. Multiplanar reformats. Dose reduction techniques were used.    FINDINGS:  INTRACRANIAL CONTENTS: No significant interval change in the right convexity subdural hematoma measuring up to 10 mm in maximal width posteriorly. Mass effect is unchanged with partial effacement of sulci in the right cerebral hemisphere and partial   effacement of the occipital horn of the right lateral ventricle. No midline shift. No CT evidence of acute infarct. Mild presumed chronic small vessel ischemic changes. Moderate generalized volume loss. No hydrocephalus.     VISUALIZED ORBITS/SINUSES/MASTOIDS: No intraorbital abnormality. No paranasal sinus mucosal disease. No middle ear or mastoid effusion.    BONES/SOFT TISSUES: Unchanged right supraorbital soft tissue hematoma and laceration.      Impression    IMPRESSION:  1.  Unchanged right convexity subdural hematoma and mild locoregional mass effect.  2.  No new or worsening intracranial blood  products. No midline shift.  3.  Right supraorbital soft tissue hematoma and laceration as before.       Discharge Medications   Current Discharge Medication List      START taking these medications    Details   amLODIPine (NORVASC) 5 MG tablet Take 1 tablet (5 mg) by mouth daily  Qty: 30 tablet, Refills: 0    Associated Diagnoses: Subdural hematoma         CONTINUE these medications which have NOT CHANGED    Details   acetaminophen (TYLENOL) 325 MG tablet Take 650 mg by mouth 2 times daily      diclofenac (VOLTAREN) 1 % topical gel Apply 2 g topically 2 times daily To R shoulder      Levothyroxine Sodium (LEVOTHROID PO) Take 50 mcg by mouth daily       QUEtiapine (SEROQUEL) 25 MG tablet Take 12.5 mg by mouth nightly as needed (agitation)      senna-docusate (SENOKOT-S/PERICOLACE) 8.6-50 MG tablet Take 2 tablets by mouth Every Mon, Wed, Fri Morning      sertraline (ZOLOFT) 25 MG tablet Take 75 mg by mouth daily      Vitamin D3 (CHOLECALCIFEROL) 25 mcg (1000 units) tablet Take 75 mcg by mouth daily      Donepezil HCl (ARICEPT PO) Take 10 mg by mouth At Bedtime         STOP taking these medications       aspirin (ASA) 81 MG chewable tablet Comments:   Reason for Stopping:             Allergies   Allergies   Allergen Reactions     Morphine

## 2023-01-13 NOTE — PLAN OF CARE
Reason for Admission: SDH    Cognitive/Mentation: A/Ox self  Neuros/CMS: Intact ex stuttering speech, inconsistent with commands, dementia   VS: stable.   Tele: SR.  GI: BS active, passing flatus, no BM this shift. Continent.  : Continent.  Pulmonary: LS clear.  Pain: denies.   Skin: bruising down whole R side from fall  Activity: Assist x 1 with GB .  Diet: regular with thin liquids. Takes pills whole or crushed in pudding.     Therapies recs: back to memory care facility   Discharge: pending    Aggression Stoplight Tool: green    End of shift summary: pt slept well overnight. Not impulsive compared to evening shift. Sitter at bedside.

## 2023-01-13 NOTE — DISCHARGE INSTRUCTIONS
[facility] Neurosurgery  [facility]  [address] [place] [address] [address]  [place] [address]  Tel [phone]  Follow up 2 weeks with Head CT prior-   [phone] for appts.

## 2023-01-13 NOTE — PROGRESS NOTES
01/12/23 1300   Appointment Info   Signing Clinician's Name / Credentials (PT) Tracey Schwartz PT   Living Environment   People in Home facility resident   Current Living Arrangements extended care facility   Home Accessibility no concerns   Living Environment Comments Memory Care   Self-Care   Activity/Exercise/Self-Care Comment pt with dementia living at , per chart pt amb fairly well at facility.  Pt only alert to first name.   General Information   Onset of Illness/Injury or Date of Surgery 01/12/23   Referring Physician Trigger Surinder   Patient/Family Therapy Goals Statement (PT) none stated   Pertinent History of Current Problem (include personal factors and/or comorbidities that impact the POC) Danni Alvarez is a 89 year old female with multiple medical problems including dementia, lives in memory care, hypothyroidism, history of depression, prior colon cancer, S/P colectomy who presents to the emergency department after an unwitnessed fall at her facility.  Head CT shows acute right cerebral convexity subdural hematoma.  She is admitted for further evaluation and treatment   Existing Precautions/Restrictions fall   Weight-Bearing Status - LUE full weight-bearing   Weight-Bearing Status - RUE full weight-bearing   Weight-Bearing Status - LLE full weight-bearing   Weight-Bearing Status - RLE full weight-bearing   General Observations No fx, Subdural Hematoma   Cognition   Affect/Mental Status (Cognition) confused   Orientation Status (Cognition) person   Follows Commands (Cognition) follows one-step commands;25-49% accuracy;repetition of directions required;verbal cues/prompting required;physical/tactile prompts required   Safety Deficit (Cognition) severe deficit;at risk behavior observed;ability to follow commands;impulsivity;insight into deficits/self-awareness;judgment;safety precautions awareness;safety precautions follow-through/compliance;problem-solving   Memory Deficit (Cognition) severe deficit    Pain Assessment   Patient Currently in Pain Yes, see Vital Sign flowsheet   Integumentary/Edema   Integumentary/Edema Comments facial and right hip swelling   Strength (Manual Muscle Testing)   Strength (Manual Muscle Testing) Able to perform R SLR;Able to perform L SLR;Deficits observed during functional mobility   Strength Comments generalized decreased strength in trunk and LE   Bed Mobility   Comment, (Bed Mobility) bed mob with SBA supine to sit,  mod A sit to supine   Transfers   Comment, (Transfers) sit to stand CGA   Gait/Stairs (Locomotion)   Bledsoe Level (Gait) minimum assist (75% patient effort)   Assistive Device (Gait) walker, front-wheeled   Distance in Feet 10'   Distance in Feet (Gait) 150'   Pattern (Gait) swing-through   Deviations/Abnormal Patterns (Gait) ashley decreased;gait speed decreased;other (see comments)   Comment, (Gait/Stairs) veers to R   Balance   Balance Comments decreased balance standing dynamic, requires B UE support   Coordination   Coordination no deficits were identified   Muscle Tone   Muscle Tone no deficits were identified   Clinical Impression   Criteria for Skilled Therapeutic Intervention Yes, treatment indicated   PT Diagnosis (PT) difficulty with bed mob and amb   Influenced by the following impairments impaired cognition, decreased strength, balance and pain   Functional limitations due to impairments impaired functional mob I and safety   Clinical Presentation (PT Evaluation Complexity) Evolving/Changing   Clinical Presentation Rationale 305 defictis   Clinical Decision Making (Complexity) moderate complexity   Planned Therapy Interventions (PT) bed mobility training;gait training;strengthening;transfer training   Risk & Benefits of therapy have been explained care plan/treatment goals reviewed   PT Total Evaluation Time   PT Lakishaal, Moderate Complexity Minutes (28453) 10   Physical Therapy Goals   PT Frequency 5x/week   PT Predicted Duration/Target Date for  Goal Attainment 01/15/23   PT Goals Bed Mobility;Transfers;Gait   PT: Bed Mobility Independent   PT: Transfers Modified independent;Sit to/from stand   PT: Gait Supervision/stand-by assist;Greater than 200 feet   Interventions   Interventions Quick Adds Gait Training;Therapeutic Activity   Therapeutic Activity   Therapeutic Activities: dynamic activities to improve functional performance Minutes (47873) 12   Treatment Detail/Skilled Intervention PT pt sitting up on edge of cart, wanting to get up, A for set up, sit to stand with CGA and FWW,  upone return to bed CGA to sit on EOB, difficulty moving back to supine, multiple cues and demonstration for scooting back , finally able to scoot some, not following sit to side for roll, pt go dirt=ectly back but doesnot work on narrow cart,  rep x 2 but ultimately had to lift LE into bed and mod A to adjust trunk.  Rep third time as pt mving to get out of bed impulsively.  bed alarm on and care needs in reach.  Rn monitorring   Gait Training   Gait Training Minutes (41531) 12   Treatment Detail/Skilled Intervention PT gait training with FWW wtih cont veer to R, min A to manage AD, pt otherwise fairly steady, cues for path finding.   Corson Level (Gait Training) contact guard   Physical Assistance Level (Gait Training) verbal cues;nonverbal cues (demo/gestures);1 person assist   Assistive Device (Gait Training) rolling walker   Pattern Analysis (Gait Training) swing-through gait   Gait Analysis Deviations decreased ashley;decreased step length;decreased stride length   Impairments (Gait Analysis/Training) balance impaired;strength decreased   PT Discharge Planning   PT Plan PT cont wtih gait, trail with no AD, bed mob   PT Discharge Recommendation (DC Rec) Long term care facility  (Memory Care)   PT Rationale for DC Rec Pt would most benefit to return to memory care, Pending fac level of A they can provide and progress wtih pt with cont inpt therapy, anticipate she  will be able to return there.  Currently appears close to baseline   PT Brief overview of current status Pt requires SBA/min with FWW, will trial no AD to see if that is her baseline and easier for her, SBA for sit to stand, bed mob sit to supine with min to mod A   Total Session Time   Timed Code Treatment Minutes 24   Total Session Time (sum of timed and untimed services) 34

## 2023-01-13 NOTE — PROGRESS NOTES
Care Management Follow Up    Length of Stay (days): 2    Expected Discharge Date: 01/13/2023     Concerns to be Addressed:       Patient plan of care discussed at interdisciplinary rounds: Yes    Anticipated Discharge Disposition: Home Care, Assisted Living     Anticipated Discharge Services:    Anticipated Discharge DME:      Patient/family educated on Medicare website which has current facility and service quality ratings:    Education Provided on the Discharge Plan:    Patient/Family in Agreement with the Plan: yes    Referrals Placed by CM/SW: External Care Coordination  Private pay costs discussed: Not applicable    Additional Information:  Following for discharge planning back to [facility]    Received call on voice mail from Jessica at [facility] ( [phone])    CC called Jessica and no answer.  Left message to discuss discharge plan and return.     Addendum 11:07:    Received return call from Jessica.  Reviewed notes from therapy and MD with her.  Pt can return to [facility].  Prefer as soon as possible  New Meds to [facility] (in [facility])  Reviewed recommendation for HH PT/OT.   They use [facility] for HC.  Jessica will arrange.  Needs orders and F2F sent with her.   Jessica will assist family to make Follow-up appointments.   Fax orders: [phone]  Main nurses numbers: [phone]    MD paged for orders and HHC.   After orders will call Daughter to arrange transport.     Care Management Discharge Note    Discharge Date: 01/13/2023       Discharge Disposition: Home Care, Assisted Living    Discharge Services:      Discharge DME:      Discharge Transportation: family or friend will provide    Private pay costs discussed: Not applicable    PAS Confirmation Code:    Patient/family educated on Medicare website which has current facility and service quality ratings:      Education Provided on the Discharge Plan:    Persons Notified of Discharge Plans: [facility] (Casandra/Jessica) Chaitanya Mcgee MD, Charge  "RN  Patient/Family in Agreement with the Plan: yes    Handoff Referral Completed: No    Additional Information:  Pt discharge back to [facility]  Daughter can transport at 1300.    MD added orders  Orders faxed to [facility]- [phone]  Updated ASHIA Guajardo (in place of Jessica) of discharge time.  Pt's daughter had concerns that [facility] staff will be able to meet her needs, provide assist as needed.  Informed Casandra of this and she will be there to meet patient and will update appropriate staff of families concern.  Casandra notes that patient \"has services and they can meet her needs\"    Charge RN updated of discharge today at 1300 and will update bedside RN and patient.  [facility] will make packet to send with.         Michelle Miranda RN          Michelle Miranda, RN      "

## 2023-01-13 NOTE — PLAN OF CARE
"Goal Outcome Evaluation:  Calm and cooperative while daughter at bedside. Oriented to self only. Slow, hesitant, stuttering speech. Followed simple commands only. Up with SBA, belt to BR to void.  Up out of bed and chair with SBA only. Bruising around R eye, along R side of body. Agitated and confused after daughter's departure, slapping at arms of staff as she walked in the hallway, \"I need to go\". Returned to room with constant redirection, did take prn seroquel when offered. Pt now calmer and drowsy, awakened for her meal but required assistance Scoring yellow on aggression screening tool for confusion and intermittent agitation. EKG attempted, unable to complete secondary to pt agitation. Sitter at bedside.                        "

## 2023-01-14 ENCOUNTER — PATIENT OUTREACH (OUTPATIENT)
Dept: CARE COORDINATION | Facility: CLINIC | Age: 88
End: 2023-01-14

## 2023-01-14 NOTE — PROGRESS NOTES
[facility]    Background: Transitional Care Management program identified per system criteria and reviewed by [facility] team for possible outreach.    Assessment: Upon chart review, [facility] Team member will not proceed with patient outreach related to this episode of Transitional Care Management program due to reason below:    Patient has discharged to a Memory Care, Long-term Care, Assisted Living or Group Home where patient is receiving on-site support with their daily cares, including support with hospital follow up plan.    Plan: Transitional Care Management episode addressed appropriately per reason noted above.      Josselyn Mcnally RN  [facility]    *Connected Care Resource Team does NOT follow patient ongoing. Referrals are identified based on internal discharge reports and the outreach is to ensure patient has an understanding of their discharge instructions.

## 2023-01-17 NOTE — CARE PLAN
Physical Therapy Discharge Summary    Reason for therapy discharge:    Discharged to home.    Progress towards therapy goal(s). See goals on Care Plan in [facility] electronic health record for goal details.  Goals not met.  Barriers to achieving goals:   discharge from facility. Pt only seen for initial evaluation and treatment.     Therapy recommendation(s):    No further therapy is recommended. **Pt not seen by discharging therapist on this date, note written based on previous treating therapist's notes and recommendations.

## 2023-01-18 ENCOUNTER — DOCUMENTATION ONLY (OUTPATIENT)
Dept: OTHER | Facility: CLINIC | Age: 88
End: 2023-01-18
Payer: COMMERCIAL

## 2023-01-25 ENCOUNTER — LAB REQUISITION (OUTPATIENT)
Dept: LAB | Facility: CLINIC | Age: 88
End: 2023-01-25
Payer: COMMERCIAL

## 2023-01-25 DIAGNOSIS — E11.59 TYPE 2 DIABETES MELLITUS WITH OTHER CIRCULATORY COMPLICATIONS (H): ICD-10-CM

## 2023-01-26 LAB
ALBUMIN SERPL BCG-MCNC: 4.3 G/DL (ref 3.5–5.2)
ALP SERPL-CCNC: 100 U/L (ref 35–104)
ALT SERPL W P-5'-P-CCNC: 15 U/L (ref 10–35)
ANION GAP SERPL CALCULATED.3IONS-SCNC: 13 MMOL/L (ref 7–15)
AST SERPL W P-5'-P-CCNC: 26 U/L (ref 10–35)
BASOPHILS # BLD AUTO: 0 10E3/UL (ref 0–0.2)
BASOPHILS NFR BLD AUTO: 1 %
BILIRUB SERPL-MCNC: 0.4 MG/DL
BUN SERPL-MCNC: 20.5 MG/DL (ref 8–23)
CALCIUM SERPL-MCNC: 9.9 MG/DL (ref 8.2–9.6)
CHLORIDE SERPL-SCNC: 98 MMOL/L (ref 98–107)
CREAT SERPL-MCNC: 0.66 MG/DL (ref 0.51–0.95)
DEPRECATED HCO3 PLAS-SCNC: 24 MMOL/L (ref 22–29)
EOSINOPHIL # BLD AUTO: 0.2 10E3/UL (ref 0–0.7)
EOSINOPHIL NFR BLD AUTO: 2 %
ERYTHROCYTE [DISTWIDTH] IN BLOOD BY AUTOMATED COUNT: 14.7 % (ref 10–15)
GFR SERPL CREATININE-BSD FRML MDRD: 83 ML/MIN/1.73M2
GLUCOSE SERPL-MCNC: 107 MG/DL (ref 70–99)
HCT VFR BLD AUTO: 42.3 % (ref 35–47)
HGB BLD-MCNC: 13.4 G/DL (ref 11.7–15.7)
IMM GRANULOCYTES # BLD: 0 10E3/UL
IMM GRANULOCYTES NFR BLD: 1 %
LYMPHOCYTES # BLD AUTO: 1.9 10E3/UL (ref 0.8–5.3)
LYMPHOCYTES NFR BLD AUTO: 22 %
MCH RBC QN AUTO: 31 PG (ref 26.5–33)
MCHC RBC AUTO-ENTMCNC: 31.7 G/DL (ref 31.5–36.5)
MCV RBC AUTO: 98 FL (ref 78–100)
MONOCYTES # BLD AUTO: 0.5 10E3/UL (ref 0–1.3)
MONOCYTES NFR BLD AUTO: 6 %
NEUTROPHILS # BLD AUTO: 6 10E3/UL (ref 1.6–8.3)
NEUTROPHILS NFR BLD AUTO: 68 %
NRBC # BLD AUTO: 0 10E3/UL
NRBC BLD AUTO-RTO: 0 /100
PLATELET # BLD AUTO: 312 10E3/UL (ref 150–450)
POTASSIUM SERPL-SCNC: 4.3 MMOL/L (ref 3.4–5.3)
PROT SERPL-MCNC: 7 G/DL (ref 6.4–8.3)
RBC # BLD AUTO: 4.32 10E6/UL (ref 3.8–5.2)
SODIUM SERPL-SCNC: 135 MMOL/L (ref 136–145)
WBC # BLD AUTO: 8.7 10E3/UL (ref 4–11)

## 2023-01-26 PROCEDURE — 36415 COLL VENOUS BLD VENIPUNCTURE: CPT | Mod: ORL | Performed by: FAMILY MEDICINE

## 2023-01-26 PROCEDURE — 85025 COMPLETE CBC W/AUTO DIFF WBC: CPT | Mod: ORL | Performed by: FAMILY MEDICINE

## 2023-01-26 PROCEDURE — P9604 ONE-WAY ALLOW PRORATED TRIP: HCPCS | Mod: ORL | Performed by: FAMILY MEDICINE

## 2023-01-26 PROCEDURE — 80053 COMPREHEN METABOLIC PANEL: CPT | Mod: ORL | Performed by: FAMILY MEDICINE

## 2023-02-07 ENCOUNTER — OFFICE VISIT (OUTPATIENT)
Dept: NEUROSURGERY | Facility: CLINIC | Age: 88
End: 2023-02-07
Payer: COMMERCIAL

## 2023-02-07 ENCOUNTER — ANCILLARY PROCEDURE (OUTPATIENT)
Dept: CT IMAGING | Facility: CLINIC | Age: 88
End: 2023-02-07
Attending: PHYSICIAN ASSISTANT
Payer: COMMERCIAL

## 2023-02-07 VITALS
DIASTOLIC BLOOD PRESSURE: 74 MMHG | HEART RATE: 95 BPM | SYSTOLIC BLOOD PRESSURE: 110 MMHG | HEIGHT: 64 IN | BODY MASS INDEX: 23.9 KG/M2 | WEIGHT: 140 LBS | OXYGEN SATURATION: 96 %

## 2023-02-07 DIAGNOSIS — S06.5XAA SUBDURAL HEMATOMA (H): Primary | ICD-10-CM

## 2023-02-07 DIAGNOSIS — S06.5XAA SUBDURAL HEMATOMA (H): ICD-10-CM

## 2023-02-07 PROCEDURE — 70450 CT HEAD/BRAIN W/O DYE: CPT

## 2023-02-07 PROCEDURE — 99213 OFFICE O/P EST LOW 20 MIN: CPT | Performed by: PHYSICIAN ASSISTANT

## 2023-02-07 ASSESSMENT — PAIN SCALES - GENERAL: PAINLEVEL: NO PAIN (0)

## 2023-02-07 NOTE — NURSING NOTE
"Danni Alvarez is a 89 year old female who presents for:  Chief Complaint   Patient presents with     RECHECK     Follow up on CT SD[facility]        Initial Vitals:  /74   Pulse 95   Ht 5' 4\" (1.626 m)   Wt 140 lb (63.5 kg)   SpO2 96%   BMI 24.03 kg/m   Estimated body mass index is 24.03 kg/m  as calculated from the following:    Height as of this encounter: 5' 4\" (1.626 m).    Weight as of this encounter: 140 lb (63.5 kg).. Body surface area is 1.69 meters squared. BP completed using cuff size: regular  No Pain (0)    Nursing Comments:     Joy Posadas      "

## 2023-02-07 NOTE — PROGRESS NOTES
Neurosurgery follow-up    Ms. Alvarez is a 89 year old female who fell 2 weeks ago and was in the hospital was found to have a right convexity subdural hematoma.  She was on aspirin 81 mg which was stopped.  She presents today for 2-week follow-up.    Patient is here with her daughter who provides history, due to the patient's dementia.    Patient's daughter states her mother is back to her baseline, not having any particular issues at this time.  She has not been reporting headache or having any weakness.    Exam    B/P: 110/74, T: Data Unavailable, P: 95, R: Data Unavailable     Alert and in no acute distress  Moving all extremities  Finger-nose slow and accurate  Negative pronator drift  Extraocular movements intact  Pupils equal and reactive        Imaging    Head CT scan demonstrated, 2/7/2023    IMPRESSION:      1. Maturing now mixed density right cerebral convexity subdural hematoma measuring up to 11 mm with mass effect on the subjacent brain parenchyma including a subtle 1 to 2 mm leftward shift of the midline structures.    Assessment    Right subdural hematoma status post fall      Plan    Recommend the patient obtain a repeat head CT scan in 1 month to follow with the evolution and hopeful resolution of her subdural hematoma.  If she is having any worsening symptoms in the interim or change in mental status they should contact the clinic or present to the emergency department.    Discussed with Dr. Rosales    Addendum 3/20/2023    Head CT scan demonstrated decrease in size from the prior exam, now 5 mm of residual thickness on the right cerebral convexity, no new intracranial hemorrhage..  Contacted the patient was doing well, okay to resume aspirin at the direction of the primary care provider, no further follow-up is needed.

## 2023-02-07 NOTE — LETTER
2/7/2023         RE: Danni Alvarez  [facility]  [address] [place], [place]. \[facility].\"" [id number]        Dear Colleague,    Thank you for referring your patient, Danni Alvarez, to the [facility]. Please see a copy of my visit note below.    Neurosurgery follow-up    Ms. Alvarez is a 89 year old female who fell 2 weeks ago and was in the hospital was found to have a right convexity subdural hematoma.  She was on aspirin 81 mg which was stopped.  She presents today for 2-week follow-up.    Patient is here with her daughter who provides history, due to the patient's dementia.    Patient's daughter states her mother is back to her baseline, not having any particular issues at this time.  She has not been reporting headache or having any weakness.    Exam    B/P: 110/74, T: Data Unavailable, P: 95, R: Data Unavailable     Alert and in no acute distress  Moving all extremities  Finger-nose slow and accurate  Negative pronator drift  Extraocular movements intact  Pupils equal and reactive        Imaging    Head CT scan demonstrated, 2/7/2023    IMPRESSION:      1. Maturing now mixed density right cerebral convexity subdural hematoma measuring up to 11 mm with mass effect on the subjacent brain parenchyma including a subtle 1 to 2 mm leftward shift of the midline structures.    Assessment    Right subdural hematoma status post fall      Plan    Recommend the patient obtain a repeat head CT scan in 1 month to follow with the evolution and hopeful resolution of her subdural hematoma.  If she is having any worsening symptoms in the interim or change in mental status they should contact the clinic or present to the emergency department.    Discussed with Dr. Rosales              Again, thank you for allowing me to participate in the care of your patient.        Sincerely,        Daniela Haile PA-C

## 2023-02-09 ENCOUNTER — TELEPHONE (OUTPATIENT)
Dept: NEUROSURGERY | Facility: CLINIC | Age: 88
End: 2023-02-09
Payer: COMMERCIAL

## 2023-02-09 NOTE — TELEPHONE ENCOUNTER
As per Daniela Haile PA-C    Recommend the patient obtain a repeat head CT scan in 1 month to follow with the evolution and hopeful resolution of her subdural hematoma.  If she is having any worsening symptoms in the interim or change in mental status they should contact the clinic or present to the emergency department.    Called patient's daughter with the above. Attempted to reach out to patient, no answer. Left voice message for patient to call clinic back to further discuss.     Message sent to schedulers to schedule CT in 1 month.

## 2023-02-09 NOTE — TELEPHONE ENCOUNTER
LVM with patients daughter to call back to schedule 1mnth follow up appointment and to schedule CT prior per staff message.

## 2023-02-13 ENCOUNTER — TELEPHONE (OUTPATIENT)
Dept: NEUROSURGERY | Facility: CLINIC | Age: 88
End: 2023-02-13
Payer: COMMERCIAL

## 2023-03-15 ENCOUNTER — LAB REQUISITION (OUTPATIENT)
Dept: LAB | Facility: CLINIC | Age: 88
End: 2023-03-15
Payer: COMMERCIAL

## 2023-03-15 DIAGNOSIS — R00.0 TACHYCARDIA, UNSPECIFIED: ICD-10-CM

## 2023-03-16 ENCOUNTER — ANCILLARY PROCEDURE (OUTPATIENT)
Dept: CT IMAGING | Facility: CLINIC | Age: 88
End: 2023-03-16
Attending: PHYSICIAN ASSISTANT
Payer: COMMERCIAL

## 2023-03-16 ENCOUNTER — LAB REQUISITION (OUTPATIENT)
Dept: LAB | Facility: CLINIC | Age: 88
End: 2023-03-16
Payer: COMMERCIAL

## 2023-03-16 DIAGNOSIS — R00.0 TACHYCARDIA, UNSPECIFIED: ICD-10-CM

## 2023-03-16 DIAGNOSIS — S06.5XAA SUBDURAL HEMATOMA (H): ICD-10-CM

## 2023-03-16 LAB
ANION GAP SERPL CALCULATED.3IONS-SCNC: 16 MMOL/L (ref 7–15)
BASOPHILS # BLD AUTO: 0 10E3/UL (ref 0–0.2)
BASOPHILS NFR BLD AUTO: 0 %
BUN SERPL-MCNC: 30.2 MG/DL (ref 8–23)
CALCIUM SERPL-MCNC: 9.7 MG/DL (ref 8.2–9.6)
CHLORIDE SERPL-SCNC: 104 MMOL/L (ref 98–107)
CREAT SERPL-MCNC: 0.75 MG/DL (ref 0.51–0.95)
DEPRECATED HCO3 PLAS-SCNC: 19 MMOL/L (ref 22–29)
EOSINOPHIL # BLD AUTO: 0.1 10E3/UL (ref 0–0.7)
EOSINOPHIL NFR BLD AUTO: 1 %
ERYTHROCYTE [DISTWIDTH] IN BLOOD BY AUTOMATED COUNT: 13.8 % (ref 10–15)
GFR SERPL CREATININE-BSD FRML MDRD: 75 ML/MIN/1.73M2
GLUCOSE SERPL-MCNC: 136 MG/DL (ref 70–99)
HCT VFR BLD AUTO: 44.7 % (ref 35–47)
HGB BLD-MCNC: 14.1 G/DL (ref 11.7–15.7)
IMM GRANULOCYTES # BLD: 0 10E3/UL
IMM GRANULOCYTES NFR BLD: 0 %
LYMPHOCYTES # BLD AUTO: 2.7 10E3/UL (ref 0.8–5.3)
LYMPHOCYTES NFR BLD AUTO: 38 %
MAGNESIUM SERPL-MCNC: 2.3 MG/DL (ref 1.7–2.3)
MCH RBC QN AUTO: 30.7 PG (ref 26.5–33)
MCHC RBC AUTO-ENTMCNC: 31.5 G/DL (ref 31.5–36.5)
MCV RBC AUTO: 97 FL (ref 78–100)
MONOCYTES # BLD AUTO: 0.5 10E3/UL (ref 0–1.3)
MONOCYTES NFR BLD AUTO: 6 %
NEUTROPHILS # BLD AUTO: 3.9 10E3/UL (ref 1.6–8.3)
NEUTROPHILS NFR BLD AUTO: 55 %
NRBC # BLD AUTO: 0 10E3/UL
NRBC BLD AUTO-RTO: 0 /100
PLATELET # BLD AUTO: 234 10E3/UL (ref 150–450)
POTASSIUM SERPL-SCNC: 4.4 MMOL/L (ref 3.4–5.3)
RBC # BLD AUTO: 4.59 10E6/UL (ref 3.8–5.2)
SODIUM SERPL-SCNC: 139 MMOL/L (ref 136–145)
TSH SERPL DL<=0.005 MIU/L-ACNC: 2.24 UIU/ML (ref 0.3–4.2)
WBC # BLD AUTO: 7.2 10E3/UL (ref 4–11)

## 2023-03-16 PROCEDURE — 36415 COLL VENOUS BLD VENIPUNCTURE: CPT | Mod: ORL | Performed by: FAMILY MEDICINE

## 2023-03-16 PROCEDURE — P9603 ONE-WAY ALLOW PRORATED MILES: HCPCS | Mod: ORL | Performed by: FAMILY MEDICINE

## 2023-03-16 PROCEDURE — 84443 ASSAY THYROID STIM HORMONE: CPT | Mod: ORL | Performed by: FAMILY MEDICINE

## 2023-03-16 PROCEDURE — 83735 ASSAY OF MAGNESIUM: CPT | Mod: ORL | Performed by: FAMILY MEDICINE

## 2023-03-16 PROCEDURE — 80048 BASIC METABOLIC PNL TOTAL CA: CPT | Mod: ORL | Performed by: FAMILY MEDICINE

## 2023-03-16 PROCEDURE — 70450 CT HEAD/BRAIN W/O DYE: CPT

## 2023-03-16 PROCEDURE — 85025 COMPLETE CBC W/AUTO DIFF WBC: CPT | Mod: ORL | Performed by: FAMILY MEDICINE

## 2023-04-12 ENCOUNTER — LAB REQUISITION (OUTPATIENT)
Dept: LAB | Facility: CLINIC | Age: 88
End: 2023-04-12
Payer: COMMERCIAL

## 2023-04-12 DIAGNOSIS — E11.42 TYPE 2 DIABETES MELLITUS WITH DIABETIC POLYNEUROPATHY (H): ICD-10-CM

## 2023-04-13 LAB — HBA1C MFR BLD: 6.8 %

## 2023-04-13 PROCEDURE — P9604 ONE-WAY ALLOW PRORATED TRIP: HCPCS | Mod: ORL | Performed by: FAMILY MEDICINE

## 2023-04-13 PROCEDURE — 83036 HEMOGLOBIN GLYCOSYLATED A1C: CPT | Mod: ORL | Performed by: FAMILY MEDICINE

## 2023-04-13 PROCEDURE — 36415 COLL VENOUS BLD VENIPUNCTURE: CPT | Mod: ORL | Performed by: FAMILY MEDICINE

## 2023-06-27 ENCOUNTER — LAB REQUISITION (OUTPATIENT)
Dept: LAB | Facility: CLINIC | Age: 88
End: 2023-06-27
Payer: COMMERCIAL

## 2023-06-27 DIAGNOSIS — R63.4 ABNORMAL WEIGHT LOSS: ICD-10-CM

## 2023-06-29 LAB
ALBUMIN SERPL BCG-MCNC: 3.8 G/DL (ref 3.5–5.2)
ALP SERPL-CCNC: 72 U/L (ref 35–104)
ALT SERPL W P-5'-P-CCNC: 18 U/L (ref 0–50)
ANION GAP SERPL CALCULATED.3IONS-SCNC: 10 MMOL/L (ref 7–15)
AST SERPL W P-5'-P-CCNC: 26 U/L (ref 0–45)
BASOPHILS # BLD AUTO: 0 10E3/UL (ref 0–0.2)
BASOPHILS NFR BLD AUTO: 1 %
BILIRUB SERPL-MCNC: 0.3 MG/DL
BUN SERPL-MCNC: 16.5 MG/DL (ref 8–23)
CALCIUM SERPL-MCNC: 9 MG/DL (ref 8.2–9.6)
CHLORIDE SERPL-SCNC: 106 MMOL/L (ref 98–107)
CREAT SERPL-MCNC: 0.64 MG/DL (ref 0.51–0.95)
DEPRECATED HCO3 PLAS-SCNC: 26 MMOL/L (ref 22–29)
EOSINOPHIL # BLD AUTO: 0.1 10E3/UL (ref 0–0.7)
EOSINOPHIL NFR BLD AUTO: 1 %
ERYTHROCYTE [DISTWIDTH] IN BLOOD BY AUTOMATED COUNT: 14.9 % (ref 10–15)
GFR SERPL CREATININE-BSD FRML MDRD: 83 ML/MIN/1.73M2
GLUCOSE SERPL-MCNC: 86 MG/DL (ref 70–99)
HCT VFR BLD AUTO: 42 % (ref 35–47)
HGB BLD-MCNC: 13.1 G/DL (ref 11.7–15.7)
IMM GRANULOCYTES # BLD: 0 10E3/UL
IMM GRANULOCYTES NFR BLD: 0 %
LYMPHOCYTES # BLD AUTO: 1.7 10E3/UL (ref 0.8–5.3)
LYMPHOCYTES NFR BLD AUTO: 43 %
MCH RBC QN AUTO: 30.5 PG (ref 26.5–33)
MCHC RBC AUTO-ENTMCNC: 31.2 G/DL (ref 31.5–36.5)
MCV RBC AUTO: 98 FL (ref 78–100)
MONOCYTES # BLD AUTO: 0.3 10E3/UL (ref 0–1.3)
MONOCYTES NFR BLD AUTO: 9 %
NEUTROPHILS # BLD AUTO: 1.8 10E3/UL (ref 1.6–8.3)
NEUTROPHILS NFR BLD AUTO: 46 %
NRBC # BLD AUTO: 0 10E3/UL
NRBC BLD AUTO-RTO: 0 /100
PLATELET # BLD AUTO: 215 10E3/UL (ref 150–450)
POTASSIUM SERPL-SCNC: 4 MMOL/L (ref 3.4–5.3)
PROT SERPL-MCNC: 6.1 G/DL (ref 6.4–8.3)
RBC # BLD AUTO: 4.3 10E6/UL (ref 3.8–5.2)
SODIUM SERPL-SCNC: 142 MMOL/L (ref 136–145)
T4 FREE SERPL-MCNC: 1.09 NG/DL (ref 0.9–1.7)
TSH SERPL DL<=0.005 MIU/L-ACNC: 2.12 UIU/ML (ref 0.3–4.2)
WBC # BLD AUTO: 3.9 10E3/UL (ref 4–11)

## 2023-06-29 PROCEDURE — 80053 COMPREHEN METABOLIC PANEL: CPT | Mod: ORL | Performed by: FAMILY MEDICINE

## 2023-06-29 PROCEDURE — 84439 ASSAY OF FREE THYROXINE: CPT | Mod: ORL | Performed by: FAMILY MEDICINE

## 2023-06-29 PROCEDURE — 85025 COMPLETE CBC W/AUTO DIFF WBC: CPT | Mod: ORL | Performed by: FAMILY MEDICINE

## 2023-06-29 PROCEDURE — 84443 ASSAY THYROID STIM HORMONE: CPT | Mod: ORL | Performed by: FAMILY MEDICINE

## 2023-06-29 PROCEDURE — P9604 ONE-WAY ALLOW PRORATED TRIP: HCPCS | Mod: ORL | Performed by: FAMILY MEDICINE

## 2023-06-29 PROCEDURE — 36415 COLL VENOUS BLD VENIPUNCTURE: CPT | Mod: ORL | Performed by: FAMILY MEDICINE

## 2023-08-09 ENCOUNTER — LAB REQUISITION (OUTPATIENT)
Dept: LAB | Facility: CLINIC | Age: 88
End: 2023-08-09
Payer: COMMERCIAL

## 2023-08-09 DIAGNOSIS — D72.819 DECREASED WHITE BLOOD CELL COUNT, UNSPECIFIED: ICD-10-CM

## 2023-08-09 DIAGNOSIS — E11.9 TYPE 2 DIABETES MELLITUS WITHOUT COMPLICATIONS (H): ICD-10-CM

## 2023-08-10 LAB
BASOPHILS # BLD AUTO: 0 10E3/UL (ref 0–0.2)
BASOPHILS NFR BLD AUTO: 0 %
EOSINOPHIL # BLD AUTO: 0.1 10E3/UL (ref 0–0.7)
EOSINOPHIL NFR BLD AUTO: 1 %
ERYTHROCYTE [DISTWIDTH] IN BLOOD BY AUTOMATED COUNT: 14.5 % (ref 10–15)
HBA1C MFR BLD: 6.5 %
HCT VFR BLD AUTO: 45.6 % (ref 35–47)
HGB BLD-MCNC: 14 G/DL (ref 11.7–15.7)
IMM GRANULOCYTES # BLD: 0 10E3/UL
IMM GRANULOCYTES NFR BLD: 0 %
LYMPHOCYTES # BLD AUTO: 2.4 10E3/UL (ref 0.8–5.3)
LYMPHOCYTES NFR BLD AUTO: 35 %
MCH RBC QN AUTO: 30.9 PG (ref 26.5–33)
MCHC RBC AUTO-ENTMCNC: 30.7 G/DL (ref 31.5–36.5)
MCV RBC AUTO: 101 FL (ref 78–100)
MONOCYTES # BLD AUTO: 0.5 10E3/UL (ref 0–1.3)
MONOCYTES NFR BLD AUTO: 7 %
NEUTROPHILS # BLD AUTO: 3.9 10E3/UL (ref 1.6–8.3)
NEUTROPHILS NFR BLD AUTO: 57 %
NRBC # BLD AUTO: 0 10E3/UL
NRBC BLD AUTO-RTO: 0 /100
PLATELET # BLD AUTO: 216 10E3/UL (ref 150–450)
RBC # BLD AUTO: 4.53 10E6/UL (ref 3.8–5.2)
RETICS # AUTO: 0.05 10E6/UL (ref 0.03–0.1)
RETICS/RBC NFR AUTO: 1 % (ref 0.5–2)
WBC # BLD AUTO: 6.9 10E3/UL (ref 4–11)

## 2023-08-10 PROCEDURE — P9603 ONE-WAY ALLOW PRORATED MILES: HCPCS | Mod: ORL | Performed by: FAMILY MEDICINE

## 2023-08-10 PROCEDURE — 36415 COLL VENOUS BLD VENIPUNCTURE: CPT | Mod: ORL | Performed by: FAMILY MEDICINE

## 2023-08-10 PROCEDURE — 83036 HEMOGLOBIN GLYCOSYLATED A1C: CPT | Mod: ORL | Performed by: FAMILY MEDICINE

## 2023-08-10 PROCEDURE — 85045 AUTOMATED RETICULOCYTE COUNT: CPT | Mod: ORL | Performed by: FAMILY MEDICINE

## 2023-08-10 PROCEDURE — 85025 COMPLETE CBC W/AUTO DIFF WBC: CPT | Mod: ORL | Performed by: FAMILY MEDICINE

## 2023-08-14 LAB
PATH REPORT.COMMENTS IMP SPEC: NORMAL
PATH REPORT.COMMENTS IMP SPEC: NORMAL
PATH REPORT.FINAL DX SPEC: NORMAL
PATH REPORT.RELEVANT HX SPEC: NORMAL

## 2023-08-14 PROCEDURE — 99207 BLOOD MORPHOLOGY PATHOLOGIST REVIEW: CPT | Performed by: PATHOLOGY

## 2023-09-13 ENCOUNTER — LAB REQUISITION (OUTPATIENT)
Dept: LAB | Facility: CLINIC | Age: 88
End: 2023-09-13
Payer: COMMERCIAL

## 2023-09-13 DIAGNOSIS — I89.0 LYMPHEDEMA, NOT ELSEWHERE CLASSIFIED: ICD-10-CM

## 2023-09-14 LAB — VIT B12 SERPL-MCNC: 379 PG/ML (ref 232–1245)

## 2023-09-14 PROCEDURE — P9604 ONE-WAY ALLOW PRORATED TRIP: HCPCS | Mod: ORL | Performed by: FAMILY MEDICINE

## 2023-09-14 PROCEDURE — 82607 VITAMIN B-12: CPT | Mod: ORL | Performed by: FAMILY MEDICINE

## 2023-09-14 PROCEDURE — 36415 COLL VENOUS BLD VENIPUNCTURE: CPT | Mod: ORL | Performed by: FAMILY MEDICINE

## 2024-05-08 ENCOUNTER — LAB REQUISITION (OUTPATIENT)
Dept: LAB | Facility: CLINIC | Age: 89
End: 2024-05-08
Payer: COMMERCIAL

## 2024-05-08 DIAGNOSIS — E11.9 TYPE 2 DIABETES MELLITUS WITHOUT COMPLICATIONS (H): ICD-10-CM

## 2024-05-08 DIAGNOSIS — I70.0 ATHEROSCLEROSIS OF AORTA (H): ICD-10-CM

## 2024-05-08 DIAGNOSIS — E03.9 HYPOTHYROIDISM, UNSPECIFIED: ICD-10-CM

## 2024-05-08 DIAGNOSIS — E55.9 VITAMIN D DEFICIENCY, UNSPECIFIED: ICD-10-CM

## 2024-05-09 LAB
BASOPHILS # BLD AUTO: 0 10E3/UL (ref 0–0.2)
BASOPHILS NFR BLD AUTO: 1 %
EOSINOPHIL # BLD AUTO: 0.1 10E3/UL (ref 0–0.7)
EOSINOPHIL NFR BLD AUTO: 2 %
ERYTHROCYTE [DISTWIDTH] IN BLOOD BY AUTOMATED COUNT: 14 % (ref 10–15)
HBA1C MFR BLD: 6.5 %
HCT VFR BLD AUTO: 39.2 % (ref 35–47)
HGB BLD-MCNC: 12.5 G/DL (ref 11.7–15.7)
IMM GRANULOCYTES # BLD: 0 10E3/UL
IMM GRANULOCYTES NFR BLD: 0 %
LYMPHOCYTES # BLD AUTO: 2.3 10E3/UL (ref 0.8–5.3)
LYMPHOCYTES NFR BLD AUTO: 39 %
MCH RBC QN AUTO: 31.2 PG (ref 26.5–33)
MCHC RBC AUTO-ENTMCNC: 31.9 G/DL (ref 31.5–36.5)
MCV RBC AUTO: 98 FL (ref 78–100)
MONOCYTES # BLD AUTO: 0.4 10E3/UL (ref 0–1.3)
MONOCYTES NFR BLD AUTO: 7 %
NEUTROPHILS # BLD AUTO: 3.1 10E3/UL (ref 1.6–8.3)
NEUTROPHILS NFR BLD AUTO: 51 %
NRBC # BLD AUTO: 0 10E3/UL
NRBC BLD AUTO-RTO: 0 /100
PLATELET # BLD AUTO: 188 10E3/UL (ref 150–450)
RBC # BLD AUTO: 4.01 10E6/UL (ref 3.8–5.2)
WBC # BLD AUTO: 6 10E3/UL (ref 4–11)

## 2024-05-09 PROCEDURE — 84436 ASSAY OF TOTAL THYROXINE: CPT | Mod: ORL

## 2024-05-09 PROCEDURE — 36415 COLL VENOUS BLD VENIPUNCTURE: CPT | Mod: ORL

## 2024-05-09 PROCEDURE — 85025 COMPLETE CBC W/AUTO DIFF WBC: CPT | Mod: ORL

## 2024-05-09 PROCEDURE — P9603 ONE-WAY ALLOW PRORATED MILES: HCPCS | Mod: ORL

## 2024-05-09 PROCEDURE — 80053 COMPREHEN METABOLIC PANEL: CPT | Mod: ORL

## 2024-05-09 PROCEDURE — 84443 ASSAY THYROID STIM HORMONE: CPT | Mod: ORL

## 2024-05-09 PROCEDURE — 82306 VITAMIN D 25 HYDROXY: CPT | Mod: ORL

## 2024-05-09 PROCEDURE — 83036 HEMOGLOBIN GLYCOSYLATED A1C: CPT | Mod: ORL

## 2024-05-10 LAB
ALBUMIN SERPL BCG-MCNC: 3.9 G/DL (ref 3.5–5.2)
ALP SERPL-CCNC: 67 U/L (ref 40–150)
ALT SERPL W P-5'-P-CCNC: 19 U/L (ref 0–50)
ANION GAP SERPL CALCULATED.3IONS-SCNC: 13 MMOL/L (ref 7–15)
AST SERPL W P-5'-P-CCNC: 25 U/L (ref 0–45)
BILIRUB SERPL-MCNC: 0.2 MG/DL
BUN SERPL-MCNC: 28.7 MG/DL (ref 8–23)
CALCIUM SERPL-MCNC: 9.1 MG/DL (ref 8.2–9.6)
CHLORIDE SERPL-SCNC: 105 MMOL/L (ref 98–107)
CREAT SERPL-MCNC: 0.79 MG/DL (ref 0.51–0.95)
DEPRECATED HCO3 PLAS-SCNC: 23 MMOL/L (ref 22–29)
EGFRCR SERPLBLD CKD-EPI 2021: 70 ML/MIN/1.73M2
GLUCOSE SERPL-MCNC: 96 MG/DL (ref 70–99)
POTASSIUM SERPL-SCNC: 4.1 MMOL/L (ref 3.4–5.3)
PROT SERPL-MCNC: 6.1 G/DL (ref 6.4–8.3)
SODIUM SERPL-SCNC: 141 MMOL/L (ref 135–145)
T4 SERPL-MCNC: 5.6 UG/DL (ref 4.5–11.7)
TSH SERPL DL<=0.005 MIU/L-ACNC: 1.42 UIU/ML (ref 0.3–4.2)
VIT D+METAB SERPL-MCNC: 44 NG/ML (ref 20–50)

## 2024-07-25 ENCOUNTER — HOSPITAL ENCOUNTER (EMERGENCY)
Facility: CLINIC | Age: 89
Discharge: NURSING, REHAB OR LONG-TERM CARE | End: 2024-07-25
Attending: EMERGENCY MEDICINE | Admitting: EMERGENCY MEDICINE
Payer: COMMERCIAL

## 2024-07-25 ENCOUNTER — APPOINTMENT (OUTPATIENT)
Dept: GENERAL RADIOLOGY | Facility: CLINIC | Age: 89
End: 2024-07-25
Attending: EMERGENCY MEDICINE
Payer: COMMERCIAL

## 2024-07-25 VITALS
HEART RATE: 68 BPM | DIASTOLIC BLOOD PRESSURE: 67 MMHG | OXYGEN SATURATION: 95 % | TEMPERATURE: 97.4 F | RESPIRATION RATE: 12 BRPM | SYSTOLIC BLOOD PRESSURE: 104 MMHG

## 2024-07-25 DIAGNOSIS — S51.012A SKIN TEAR OF LEFT ELBOW WITHOUT COMPLICATION, INITIAL ENCOUNTER: ICD-10-CM

## 2024-07-25 DIAGNOSIS — Z86.59 HISTORY OF DEMENTIA: ICD-10-CM

## 2024-07-25 DIAGNOSIS — S40.011A CONTUSION OF RIGHT SHOULDER, INITIAL ENCOUNTER: ICD-10-CM

## 2024-07-25 DIAGNOSIS — S70.01XA CONTUSION OF RIGHT HIP, INITIAL ENCOUNTER: ICD-10-CM
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
## 2024-07-25 LAB
ANION GAP SERPL CALCULATED.3IONS-SCNC: 9 MMOL/L (ref 7–15)
BASOPHILS # BLD AUTO: 0 10E3/UL (ref 0–0.2)
BASOPHILS NFR BLD AUTO: 0 %
BUN SERPL-MCNC: 19.3 MG/DL (ref 8–23)
CALCIUM SERPL-MCNC: 9.3 MG/DL (ref 8.8–10.4)
CHLORIDE SERPL-SCNC: 104 MMOL/L (ref 98–107)
CREAT SERPL-MCNC: 0.75 MG/DL (ref 0.51–0.95)
EGFRCR SERPLBLD CKD-EPI 2021: 75 ML/MIN/1.73M2
EOSINOPHIL # BLD AUTO: 0.1 10E3/UL (ref 0–0.7)
EOSINOPHIL NFR BLD AUTO: 1 %
ERYTHROCYTE [DISTWIDTH] IN BLOOD BY AUTOMATED COUNT: 13.8 % (ref 10–15)
GLUCOSE SERPL-MCNC: 97 MG/DL (ref 70–99)
HCO3 SERPL-SCNC: 27 MMOL/L (ref 22–29)
HCT VFR BLD AUTO: 42.7 % (ref 35–47)
HGB BLD-MCNC: 14 G/DL (ref 11.7–15.7)
IMM GRANULOCYTES # BLD: 0 10E3/UL
IMM GRANULOCYTES NFR BLD: 0 %
LYMPHOCYTES # BLD AUTO: 1.4 10E3/UL (ref 0.8–5.3)
LYMPHOCYTES NFR BLD AUTO: 20 %
MCH RBC QN AUTO: 32 PG (ref 26.5–33)
MCHC RBC AUTO-ENTMCNC: 32.8 G/DL (ref 31.5–36.5)
MCV RBC AUTO: 98 FL (ref 78–100)
MONOCYTES # BLD AUTO: 0.5 10E3/UL (ref 0–1.3)
MONOCYTES NFR BLD AUTO: 7 %
NEUTROPHILS # BLD AUTO: 5.3 10E3/UL (ref 1.6–8.3)
NEUTROPHILS NFR BLD AUTO: 72 %
NRBC # BLD AUTO: 0 10E3/UL
NRBC BLD AUTO-RTO: 0 /100
PLAT MORPH BLD: NORMAL
PLATELET # BLD AUTO: 193 10E3/UL (ref 150–450)
POTASSIUM SERPL-SCNC: 4.4 MMOL/L (ref 3.4–5.3)
RBC # BLD AUTO: 4.37 10E6/UL (ref 3.8–5.2)
RBC MORPH BLD: NORMAL
SODIUM SERPL-SCNC: 140 MMOL/L (ref 135–145)
WBC # BLD AUTO: 7.3 10E3/UL (ref 4–11)

## 2024-07-25 PROCEDURE — 80048 BASIC METABOLIC PNL TOTAL CA: CPT | Performed by: EMERGENCY MEDICINE

## 2024-07-25 PROCEDURE — 85025 COMPLETE CBC W/AUTO DIFF WBC: CPT | Performed by: EMERGENCY MEDICINE

## 2024-07-25 PROCEDURE — 73502 X-RAY EXAM HIP UNI 2-3 VIEWS: CPT

## 2024-07-25 PROCEDURE — 73030 X-RAY EXAM OF SHOULDER: CPT | Mod: RT

## 2024-07-25 PROCEDURE — 36415 COLL VENOUS BLD VENIPUNCTURE: CPT | Performed by: EMERGENCY MEDICINE

## 2024-07-25 PROCEDURE — 99284 EMERGENCY DEPT VISIT MOD MDM: CPT

## 2024-07-25 PROCEDURE — 250N000013 HC RX MED GY IP 250 OP 250 PS 637: Performed by: EMERGENCY MEDICINE

## 2024-07-25 RX ORDER — ACETAMINOPHEN 500 MG
1000 TABLET ORAL ONCE
Status: COMPLETED | OUTPATIENT
Start: 2024-07-25 | End: 2024-07-25

## 2024-07-25 RX ADMIN — ACETAMINOPHEN 1000 MG: 500 TABLET, FILM COATED ORAL at 09:00

## 2024-07-25 ASSESSMENT — ACTIVITIES OF DAILY LIVING (ADL)
ADLS_ACUITY_SCORE: 38

## 2024-07-25 NOTE — ED TRIAGE NOTES
BIBA from memory care due to unwitnessed fall,unknown hit her head,unknown loss of consciousness sustained bilateral shoulder pain.  Reported not on blood thinner,ambulatory with little gait disturbance after the fall,likes to wander accordingly.

## 2024-07-25 NOTE — ED NOTES
Pt walked independently with walker approx.100 feet with no incident. Steady gate, no complaints of pain, dizziness, or shortness of breath.

## 2024-07-25 NOTE — DISCHARGE INSTRUCTIONS
Ice to the bruised areas, leave the dressing on the skin tear wound and manage appropriately at the nursing home.  Always use the walker.  Tylenol as needed.  Could consider getting her a bed that is closer to the nurses station in the memory care unit so they can watch her closer or consider some type of a bed alarm that would notify them if she tries to get out of bed in the night.  Follow-up with your doctor as needed.

## 2024-07-25 NOTE — ED PROVIDER NOTES
Emergency Department Note      History of Present Illness     Chief Complaint   Fall and Shoulder Pain      HPI   Danni Alvarez is a 89 year old female with a history of previous falls and subsequent fractures, HLD, GERD, type II DM, and subdural hematoma who presents to the emergency department for evaluation after a fall and shoulder pain. She was brought in ambulance from a [facility] care facility. Per EMS to nurse, she had an unwitnessed fall this morning. The staff at the [facility] facility reportedly checked in on her at about 0300 this morning and she was in bed, but when they returned at about 0600 am, she was on the floor following a fall. It is unclear whether she hit her head or lost consciousness upon falling. She endorses some right hip pain, as well as right shoulder pain. Her left side, including left arm seems to be pain free. She is not on any blood thinning medications. She does not endorse any head or neck pain. She was reportedly ambulatory after the fall, but she did seem to have a slight gait disturbance per nursing staff.     Independent Historian   EMS and nurse as detailed above.    Review of External Notes   I reviewed the office note from 2/7/23 when she had a subdural hematoma.     Past Medical History   Medical History and Problem List   Anxiety state  HLD  MDD  Malignant neoplasm of colon  Mild cognitive impairment, so stated  Unspecified hypothyroidism  Shoulder impingement syndrome  Rhabdomyolysis  Abnormal glucose  B12 deficiency  GERD  HSV  Closed fracture of multiple ribs of right side with routine healing  Closed fracture of right scapula  Closed nondisplaced fracture of right clavicle with routine healing  Fall  Subdural hematoma  Subgaleal hemorrhage  Scalp laceration  Vascular dementia  Atherosclerosis of aorta  Type II DM    Medications   amlodipine  Donepezil  Levothyroxine Sodium  Quetiapine  senna-docusate  Sertraline  Aspirin  oxybutynin    Surgical History   Cataract  extraction  Colectomy  Hernia repair  Repair ptosis bilateral  Tubal ligation  Physical Exam     Patient Vitals for the past 24 hrs:   BP Temp Temp src Pulse Resp SpO2   07/25/24 0756 -- -- -- -- -- 95 %   07/25/24 0716 -- -- -- -- -- 90 %   07/25/24 0706 -- -- -- -- -- 95 %   07/25/24 0704 -- -- -- 61 -- --   07/25/24 0700 134/57 97.4  F (36.3  C) Oral -- 12 91 %     Physical Exam  Nursing note and vitals reviewed.    Constitutional:  Appears comfortable.    HENT:                Nose normal.  No discharge.      Oral mucosa is moist. No evidence of head or neck trauma, no bruising.  Eyes:    Conjunctivae are normal without injection.  Pupils are equal.  Cardiovascular:  Normal heart sounds and peripheral pulses 2+ and equal.       No murmur or raghavendra.  Slightly irregular pulse with PVCs.  Pulmonary:  Effort normal and breath sounds clear to auscultation bilaterally.    No respiratory distress.   GI:    Soft. No distension and no mass. No tenderness.   Musculoskeletal:  Clavicle deformity on right. Proximal humeral tenderness and pain with movement. Left arm no pain. Left leg and hip no pain. Right leg reveals pain in the hip with leg movement, but not shortened. Skin tear on left arm around the lateral elbow.  Neurological:   Alert and severe dementia noted. No focal weakness.  Pleasant  Skin:    Skin is warm and dry. No rash noted.  Skin tear noted as above.  Psychiatric:   Behavior is normal.  Very pleasant but advanced dementia.      Diagnostics     Lab Results   Labs Ordered and Resulted from Time of ED Arrival to Time of ED Departure   BASIC METABOLIC PANEL - Normal       Result Value    Sodium 140      Potassium 4.4      Chloride 104      Carbon Dioxide (CO2) 27      Anion Gap 9      Urea Nitrogen 19.3      Creatinine 0.75      GFR Estimate 75      Calcium 9.3      Glucose 97     CBC WITH PLATELETS AND DIFFERENTIAL    WBC Count 7.3      RBC Count 4.37      Hemoglobin 14.0      Hematocrit 42.7      MCV 98       MCH 32.0      MCHC 32.8      RDW 13.8      Platelet Count 193      % Neutrophils 72      % Lymphocytes 20      % Monocytes 7      % Eosinophils 1      % Basophils 0      % Immature Granulocytes 0      NRBCs per 100 WBC 0      Absolute Neutrophils 5.3      Absolute Lymphocytes 1.4      Absolute Monocytes 0.5      Absolute Eosinophils 0.1      Absolute Basophils 0.0      Absolute Immature Granulocytes 0.0      Absolute NRBCs 0.0     RBC AND PLATELET MORPHOLOGY    RBC Morphology Confirmed RBC Indices      Platelet Assessment        Value: Automated Count Confirmed. Platelet morphology is normal.       Imaging   XR Pelvis w Hip Right 1 View   Final Result   IMPRESSION:      No evidence of acute fracture or dislocation within the limitations of   osteopenia. Chronic fracture deformities of the bilateral inferior   pubic rami. Degenerative changes in the sacroiliac joints.      ROSENDO JAIN MD            SYSTEM ID:  [id number]      XR Shoulder Right G/E 3 Views   Final Result   IMPRESSION:      Subtle cortical irregularity in the region of the humeral neck on the   axillary view. While this could be artifactual in nature, a   nondisplaced fracture to appear similar. Cross-sectional imaging could   be obtained for confirmation if desired.      Significant narrowing of the acromiohumeral interval, suggest   underlying rotator cuff pathology. Chronic right mid clavicular   fracture deformity. Additional chronic post traumatic deformity of   multiple right-sided ribs.      Osteopenia.      NOTE: ABNORMAL REPORT      THE DICTATION ABOVE DESCRIBES AN ABNORMAL REPORT FOR WHICH FOLLOW-UP   IS NEEDED.      ROSENDO JAIN MD            SYSTEM ID:  [id number]          EKG   None    Independent Interpretation   XR pelvis right shows no acute fracture.  XR shoulder right shows slight irregularity at humeral neck, chronic clavicle fracture. DJD.    ED Course      Medications Administered   Medications   acetaminophen (TYLENOL)  tablet 1,000 mg (1,000 mg Oral $Given 7/25/24 0900)       Procedures   Procedures     Discussion of Management   None    ED Course   ED Course as of 07/25/24 1010   Thu Jul 25, 2024   0066 I obtained history and examined the patient as noted above.     0848 I rechecked the patient and explained findings.     0941 I rechecked the patient and updated. I discussed plan for discharge home.       Optional/Additional Documentation  None    Medical Decision Making / Diagnosis     CMS Diagnoses: None    MIPS       None    MDM   Danni lAvarez is a 89 year old female who comes in after a fall.  She has advanced dementia and does not remember to use her walker.  She had a skin tear on her left arm which was cleaned and dressed.  She had tenderness in the right humerus and an old clavicle fracture noted and some tenderness over the hip on the right.  X-ray of the hip was negative right humeral x-ray at that was normal but there was just a slight cortical irregularity seen on 1 view.  After Tylenol, she got up and used her walker and had no apparent pain in the arm so I do not think it was a fracture.  There was no evidence of head trauma, she did have a subdural 2 years ago and I discussed with the family that I do not see any evidence of trauma and she is acting normally and she is not on blood thinners and we all elected to hold on a head CT.  If her behavior changes becomes lethargic or vomits she can always return and we can get a CT scan.  Patient ambulated with her walker well and will be discharged back to the care center.  Did recommend to the family they talk to the place about possibly a bed alarm and/or moving her to a room closer to the nurses station at the nursing home.  They can call the doctor if there are any questions.  Her labs came back normal.  Vital signs were good.    Ice to the bruised areas, leave the dressing on the skin tear wound and manage appropriately at the nursing home.  Always use the  walker.  Tylenol as needed.  Could consider getting her a bed that is closer to the nurses station in the memory care unit so they can watch her closer or consider some type of a bed alarm that would notify them if she tries to get out of bed in the night.  Follow-up with your doctor as needed.      Disposition   The patient was discharged.     Diagnosis     ICD-10-CM    1. Fall, initial encounter  W19.XXXA       2. Skin tear of left elbow without complication, initial encounter  S51.012A       3. Contusion of right hip, initial encounter  S70.01XA       4. History of dementia  Z86.59       5. Contusion of right shoulder, initial encounter  S40.011A            Discharge Medications   New Prescriptions    No medications on file         Scribe Disclosure:  I, Leah Tapia, am serving as a scribe at 7:50 AM on 7/25/2024 to document services personally performed by Anny Gross MD based on my observations and the provider's statements to me.        Anny Gross MD  07/25/24 1010

## 2024-08-05 ENCOUNTER — APPOINTMENT (OUTPATIENT)
Dept: CT IMAGING | Facility: CLINIC | Age: 89
DRG: 083 | End: 2024-08-05
Attending: EMERGENCY MEDICINE
Payer: COMMERCIAL

## 2024-08-05 ENCOUNTER — APPOINTMENT (OUTPATIENT)
Dept: GENERAL RADIOLOGY | Facility: CLINIC | Age: 89
DRG: 083 | End: 2024-08-05
Attending: EMERGENCY MEDICINE
Payer: COMMERCIAL

## 2024-08-05 ENCOUNTER — HOSPITAL ENCOUNTER (INPATIENT)
Facility: CLINIC | Age: 89
LOS: 2 days | Discharge: HOME HEALTH | DRG: 083 | End: 2024-08-07
Attending: EMERGENCY MEDICINE | Admitting: INTERNAL MEDICINE
Payer: COMMERCIAL

## 2024-08-05 DIAGNOSIS — R41.89 COGNITIVE IMPAIRMENT: ICD-10-CM

## 2024-08-05 DIAGNOSIS — S06.5XAA SDH (SUBDURAL HEMATOMA) (H): ICD-10-CM

## 2024-08-05 DIAGNOSIS — S43.005A SHOULDER DISLOCATION, LEFT, INITIAL ENCOUNTER: ICD-10-CM

## 2024-08-05 DIAGNOSIS — R29.6 UNWITNESSED FALL: ICD-10-CM

## 2024-08-05 DIAGNOSIS — Z86.59 HISTORY OF DEMENTIA: ICD-10-CM

## 2024-08-05 DIAGNOSIS — R29.6 FALLS FREQUENTLY: ICD-10-CM

## 2024-08-05 DIAGNOSIS — S06.5XAA SUBDURAL HEMATOMA (H): Primary | ICD-10-CM

## 2024-08-05 LAB
ALBUMIN UR-MCNC: 30 MG/DL
ANION GAP SERPL CALCULATED.3IONS-SCNC: 13 MMOL/L (ref 7–15)
APPEARANCE UR: CLEAR
ATRIAL RATE - MUSE: 77 BPM
BASOPHILS # BLD AUTO: 0 10E3/UL (ref 0–0.2)
BASOPHILS NFR BLD AUTO: 0 %
BILIRUB UR QL STRIP: NEGATIVE
BUN SERPL-MCNC: 26.7 MG/DL (ref 8–23)
CALCIUM SERPL-MCNC: 9.4 MG/DL (ref 8.8–10.4)
CHLORIDE SERPL-SCNC: 102 MMOL/L (ref 98–107)
COLOR UR AUTO: ABNORMAL
CREAT SERPL-MCNC: 0.94 MG/DL (ref 0.51–0.95)
DIASTOLIC BLOOD PRESSURE - MUSE: NORMAL MMHG
EGFRCR SERPLBLD CKD-EPI 2021: 57 ML/MIN/1.73M2
EOSINOPHIL # BLD AUTO: 0 10E3/UL (ref 0–0.7)
EOSINOPHIL NFR BLD AUTO: 0 %
ERYTHROCYTE [DISTWIDTH] IN BLOOD BY AUTOMATED COUNT: 14.1 % (ref 10–15)
GLUCOSE SERPL-MCNC: 158 MG/DL (ref 70–99)
GLUCOSE UR STRIP-MCNC: NEGATIVE MG/DL
HCO3 SERPL-SCNC: 25 MMOL/L (ref 22–29)
HCT VFR BLD AUTO: 41.4 % (ref 35–47)
HGB BLD-MCNC: 13.1 G/DL (ref 11.7–15.7)
HGB UR QL STRIP: NEGATIVE
IMM GRANULOCYTES # BLD: 0 10E3/UL
IMM GRANULOCYTES NFR BLD: 0 %
INTERPRETATION ECG - MUSE: NORMAL
KETONES UR STRIP-MCNC: ABNORMAL MG/DL
LEUKOCYTE ESTERASE UR QL STRIP: ABNORMAL
LYMPHOCYTES # BLD AUTO: 1.5 10E3/UL (ref 0.8–5.3)
LYMPHOCYTES NFR BLD AUTO: 16 %
MCH RBC QN AUTO: 31.4 PG (ref 26.5–33)
MCHC RBC AUTO-ENTMCNC: 31.6 G/DL (ref 31.5–36.5)
MCV RBC AUTO: 99 FL (ref 78–100)
MONOCYTES # BLD AUTO: 0.4 10E3/UL (ref 0–1.3)
MONOCYTES NFR BLD AUTO: 4 %
MUCOUS THREADS #/AREA URNS LPF: PRESENT /LPF
NEUTROPHILS # BLD AUTO: 7.2 10E3/UL (ref 1.6–8.3)
NEUTROPHILS NFR BLD AUTO: 79 %
NITRATE UR QL: NEGATIVE
NRBC # BLD AUTO: 0 10E3/UL
NRBC BLD AUTO-RTO: 0 /100
P AXIS - MUSE: 62 DEGREES
PH UR STRIP: 5.5 [PH] (ref 5–7)
PLATELET # BLD AUTO: 187 10E3/UL (ref 150–450)
POTASSIUM SERPL-SCNC: 5 MMOL/L (ref 3.4–5.3)
PR INTERVAL - MUSE: 164 MS
QRS DURATION - MUSE: 72 MS
QT - MUSE: 428 MS
QTC - MUSE: 484 MS
R AXIS - MUSE: 85 DEGREES
RBC # BLD AUTO: 4.17 10E6/UL (ref 3.8–5.2)
RBC URINE: 4 /HPF
SODIUM SERPL-SCNC: 140 MMOL/L (ref 135–145)
SP GR UR STRIP: 1.03 (ref 1–1.03)
SQUAMOUS EPITHELIAL: 1 /HPF
SYSTOLIC BLOOD PRESSURE - MUSE: NORMAL MMHG
T AXIS - MUSE: 85 DEGREES
UROBILINOGEN UR STRIP-MCNC: NORMAL MG/DL
VENTRICULAR RATE- MUSE: 77 BPM
WBC # BLD AUTO: 9.2 10E3/UL (ref 4–11)
WBC URINE: 24 /HPF

## 2024-08-05 PROCEDURE — 99285 EMERGENCY DEPT VISIT HI MDM: CPT | Mod: 25

## 2024-08-05 PROCEDURE — 72125 CT NECK SPINE W/O DYE: CPT

## 2024-08-05 PROCEDURE — 0RSKXZZ REPOSITION LEFT SHOULDER JOINT, EXTERNAL APPROACH: ICD-10-PCS | Performed by: EMERGENCY MEDICINE

## 2024-08-05 PROCEDURE — 85025 COMPLETE CBC W/AUTO DIFF WBC: CPT | Performed by: EMERGENCY MEDICINE

## 2024-08-05 PROCEDURE — 87086 URINE CULTURE/COLONY COUNT: CPT | Performed by: EMERGENCY MEDICINE

## 2024-08-05 PROCEDURE — 80048 BASIC METABOLIC PNL TOTAL CA: CPT | Performed by: EMERGENCY MEDICINE

## 2024-08-05 PROCEDURE — 81001 URINALYSIS AUTO W/SCOPE: CPT | Performed by: EMERGENCY MEDICINE

## 2024-08-05 PROCEDURE — 36415 COLL VENOUS BLD VENIPUNCTURE: CPT | Performed by: EMERGENCY MEDICINE

## 2024-08-05 PROCEDURE — 999N000065 XR SHOULDER LEFT PORT G/E 2 VIEWS: Mod: LT

## 2024-08-05 PROCEDURE — 93005 ELECTROCARDIOGRAM TRACING: CPT

## 2024-08-05 PROCEDURE — 73030 X-RAY EXAM OF SHOULDER: CPT | Mod: LT

## 2024-08-05 PROCEDURE — 250N000011 HC RX IP 250 OP 636: Performed by: EMERGENCY MEDICINE

## 2024-08-05 PROCEDURE — 23650 CLTX SHO DSLC W/MNPJ WO ANES: CPT | Mod: LT

## 2024-08-05 PROCEDURE — 96374 THER/PROPH/DIAG INJ IV PUSH: CPT

## 2024-08-05 PROCEDURE — 70450 CT HEAD/BRAIN W/O DYE: CPT

## 2024-08-05 PROCEDURE — 120N000001 HC R&B MED SURG/OB

## 2024-08-05 RX ORDER — FENTANYL CITRATE 50 UG/ML
50 INJECTION, SOLUTION INTRAMUSCULAR; INTRAVENOUS ONCE
Status: COMPLETED | OUTPATIENT
Start: 2024-08-05 | End: 2024-08-05

## 2024-08-05 RX ADMIN — FENTANYL CITRATE 50 MCG: 50 INJECTION, SOLUTION INTRAMUSCULAR; INTRAVENOUS at 21:42

## 2024-08-05 ASSESSMENT — ACTIVITIES OF DAILY LIVING (ADL)
ADLS_ACUITY_SCORE: 38
ADLS_ACUITY_SCORE: 38

## 2024-08-06 ENCOUNTER — APPOINTMENT (OUTPATIENT)
Dept: PHYSICAL THERAPY | Facility: CLINIC | Age: 89
DRG: 083 | End: 2024-08-06
Attending: INTERNAL MEDICINE
Payer: COMMERCIAL

## 2024-08-06 ENCOUNTER — APPOINTMENT (OUTPATIENT)
Dept: CT IMAGING | Facility: CLINIC | Age: 89
DRG: 083 | End: 2024-08-06
Attending: INTERNAL MEDICINE
Payer: COMMERCIAL

## 2024-08-06 PROCEDURE — 99223 1ST HOSP IP/OBS HIGH 75: CPT | Performed by: INTERNAL MEDICINE

## 2024-08-06 PROCEDURE — 97162 PT EVAL MOD COMPLEX 30 MIN: CPT | Mod: GP

## 2024-08-06 PROCEDURE — 250N000013 HC RX MED GY IP 250 OP 250 PS 637: Performed by: INTERNAL MEDICINE

## 2024-08-06 PROCEDURE — 70450 CT HEAD/BRAIN W/O DYE: CPT

## 2024-08-06 PROCEDURE — 97530 THERAPEUTIC ACTIVITIES: CPT | Mod: GP

## 2024-08-06 PROCEDURE — 120N000001 HC R&B MED SURG/OB

## 2024-08-06 PROCEDURE — 99207 PR NO BILLABLE SERVICE THIS VISIT: CPT | Performed by: INTERNAL MEDICINE

## 2024-08-06 PROCEDURE — 99207 PR APP CREDIT; MD BILLING SHARED VISIT: CPT | Performed by: PHYSICIAN ASSISTANT

## 2024-08-06 PROCEDURE — 258N000003 HC RX IP 258 OP 636: Performed by: INTERNAL MEDICINE

## 2024-08-06 PROCEDURE — 99418 PROLNG IP/OBS E/M EA 15 MIN: CPT | Performed by: INTERNAL MEDICINE

## 2024-08-06 PROCEDURE — 99254 IP/OBS CNSLTJ NEW/EST MOD 60: CPT | Mod: FS | Performed by: NEUROLOGICAL SURGERY

## 2024-08-06 RX ORDER — SODIUM CHLORIDE 9 MG/ML
INJECTION, SOLUTION INTRAVENOUS CONTINUOUS
Status: DISCONTINUED | OUTPATIENT
Start: 2024-08-06 | End: 2024-08-06

## 2024-08-06 RX ORDER — HYDROMORPHONE HCL IN WATER/PF 6 MG/30 ML
0.4 PATIENT CONTROLLED ANALGESIA SYRINGE INTRAVENOUS
Status: DISCONTINUED | OUTPATIENT
Start: 2024-08-06 | End: 2024-08-07 | Stop reason: HOSPADM

## 2024-08-06 RX ORDER — ONDANSETRON 2 MG/ML
4 INJECTION INTRAMUSCULAR; INTRAVENOUS EVERY 6 HOURS PRN
Status: DISCONTINUED | OUTPATIENT
Start: 2024-08-06 | End: 2024-08-07 | Stop reason: HOSPADM

## 2024-08-06 RX ORDER — DONEPEZIL HYDROCHLORIDE 10 MG/1
10 TABLET, FILM COATED ORAL AT BEDTIME
COMMUNITY

## 2024-08-06 RX ORDER — AMOXICILLIN 250 MG
2 CAPSULE ORAL 2 TIMES DAILY PRN
Status: DISCONTINUED | OUTPATIENT
Start: 2024-08-06 | End: 2024-08-07 | Stop reason: HOSPADM

## 2024-08-06 RX ORDER — NALOXONE HYDROCHLORIDE 0.4 MG/ML
0.4 INJECTION, SOLUTION INTRAMUSCULAR; INTRAVENOUS; SUBCUTANEOUS
Status: DISCONTINUED | OUTPATIENT
Start: 2024-08-06 | End: 2024-08-07 | Stop reason: HOSPADM

## 2024-08-06 RX ORDER — ACETAMINOPHEN 650 MG/1
650 SUPPOSITORY RECTAL EVERY 4 HOURS PRN
Status: DISCONTINUED | OUTPATIENT
Start: 2024-08-06 | End: 2024-08-07 | Stop reason: HOSPADM

## 2024-08-06 RX ORDER — QUETIAPINE FUMARATE 25 MG/1
6.25 TABLET, FILM COATED ORAL 2 TIMES DAILY
COMMUNITY

## 2024-08-06 RX ORDER — LEVOTHYROXINE SODIUM 50 UG/1
50 TABLET ORAL
Status: DISCONTINUED | OUTPATIENT
Start: 2024-08-07 | End: 2024-08-07 | Stop reason: HOSPADM

## 2024-08-06 RX ORDER — CALCIUM CARBONATE 500 MG/1
1000 TABLET, CHEWABLE ORAL 4 TIMES DAILY PRN
Status: DISCONTINUED | OUTPATIENT
Start: 2024-08-06 | End: 2024-08-07 | Stop reason: HOSPADM

## 2024-08-06 RX ORDER — ACETAMINOPHEN 325 MG/1
650 TABLET ORAL 2 TIMES DAILY
Status: DISCONTINUED | OUTPATIENT
Start: 2024-08-06 | End: 2024-08-07 | Stop reason: HOSPADM

## 2024-08-06 RX ORDER — ACETAMINOPHEN 325 MG/1
650 TABLET ORAL EVERY 4 HOURS PRN
Status: DISCONTINUED | OUTPATIENT
Start: 2024-08-06 | End: 2024-08-07 | Stop reason: HOSPADM

## 2024-08-06 RX ORDER — LABETALOL HYDROCHLORIDE 5 MG/ML
10 INJECTION, SOLUTION INTRAVENOUS
Status: DISCONTINUED | OUTPATIENT
Start: 2024-08-06 | End: 2024-08-07 | Stop reason: HOSPADM

## 2024-08-06 RX ORDER — ONDANSETRON 4 MG/1
4 TABLET, ORALLY DISINTEGRATING ORAL EVERY 6 HOURS PRN
Status: DISCONTINUED | OUTPATIENT
Start: 2024-08-06 | End: 2024-08-07 | Stop reason: HOSPADM

## 2024-08-06 RX ORDER — HYDROMORPHONE HCL IN WATER/PF 6 MG/30 ML
0.2 PATIENT CONTROLLED ANALGESIA SYRINGE INTRAVENOUS
Status: DISCONTINUED | OUTPATIENT
Start: 2024-08-06 | End: 2024-08-07 | Stop reason: HOSPADM

## 2024-08-06 RX ORDER — AMOXICILLIN 250 MG
1 CAPSULE ORAL 2 TIMES DAILY PRN
Status: DISCONTINUED | OUTPATIENT
Start: 2024-08-06 | End: 2024-08-07 | Stop reason: HOSPADM

## 2024-08-06 RX ORDER — LIDOCAINE 40 MG/G
CREAM TOPICAL
Status: DISCONTINUED | OUTPATIENT
Start: 2024-08-06 | End: 2024-08-07 | Stop reason: HOSPADM

## 2024-08-06 RX ORDER — HYDRALAZINE HYDROCHLORIDE 10 MG/1
10 TABLET, FILM COATED ORAL EVERY 4 HOURS PRN
Status: DISCONTINUED | OUTPATIENT
Start: 2024-08-06 | End: 2024-08-07 | Stop reason: HOSPADM

## 2024-08-06 RX ORDER — NALOXONE HYDROCHLORIDE 0.4 MG/ML
0.2 INJECTION, SOLUTION INTRAMUSCULAR; INTRAVENOUS; SUBCUTANEOUS
Status: DISCONTINUED | OUTPATIENT
Start: 2024-08-06 | End: 2024-08-07 | Stop reason: HOSPADM

## 2024-08-06 RX ORDER — OLANZAPINE 2.5 MG/1
2.5 TABLET, FILM COATED ORAL 2 TIMES DAILY PRN
Status: DISCONTINUED | OUTPATIENT
Start: 2024-08-06 | End: 2024-08-07 | Stop reason: HOSPADM

## 2024-08-06 RX ORDER — LOPERAMIDE HCL 2 MG
2 CAPSULE ORAL DAILY PRN
COMMUNITY

## 2024-08-06 RX ORDER — OLANZAPINE 10 MG/2ML
5 INJECTION, POWDER, FOR SOLUTION INTRAMUSCULAR 2 TIMES DAILY PRN
Status: DISCONTINUED | OUTPATIENT
Start: 2024-08-06 | End: 2024-08-07 | Stop reason: HOSPADM

## 2024-08-06 RX ORDER — HYDRALAZINE HYDROCHLORIDE 20 MG/ML
10 INJECTION INTRAMUSCULAR; INTRAVENOUS EVERY 4 HOURS PRN
Status: DISCONTINUED | OUTPATIENT
Start: 2024-08-06 | End: 2024-08-07 | Stop reason: HOSPADM

## 2024-08-06 RX ORDER — DONEPEZIL HYDROCHLORIDE 10 MG/1
10 TABLET, FILM COATED ORAL AT BEDTIME
Status: DISCONTINUED | OUTPATIENT
Start: 2024-08-06 | End: 2024-08-07 | Stop reason: HOSPADM

## 2024-08-06 RX ORDER — AMLODIPINE BESYLATE 5 MG/1
5 TABLET ORAL DAILY
Status: DISCONTINUED | OUTPATIENT
Start: 2024-08-06 | End: 2024-08-06

## 2024-08-06 RX ORDER — LEVOTHYROXINE SODIUM 50 UG/1
50 TABLET ORAL DAILY
COMMUNITY

## 2024-08-06 RX ADMIN — QUETIAPINE FUMARATE 6.25 MG: 25 TABLET ORAL at 22:04

## 2024-08-06 RX ADMIN — ACETAMINOPHEN 650 MG: 325 TABLET, FILM COATED ORAL at 22:04

## 2024-08-06 RX ADMIN — DONEPEZIL HYDROCHLORIDE 10 MG: 10 TABLET ORAL at 22:04

## 2024-08-06 RX ADMIN — DICLOFENAC 2 G: 10 GEL TOPICAL at 20:21

## 2024-08-06 RX ADMIN — SODIUM CHLORIDE: 9 INJECTION, SOLUTION INTRAVENOUS at 02:44

## 2024-08-06 RX ADMIN — ACETAMINOPHEN 650 MG: 325 TABLET, FILM COATED ORAL at 13:38

## 2024-08-06 ASSESSMENT — ACTIVITIES OF DAILY LIVING (ADL)
ADLS_ACUITY_SCORE: 50
ADLS_ACUITY_SCORE: 38
ADLS_ACUITY_SCORE: 38
ADLS_ACUITY_SCORE: 50
ADLS_ACUITY_SCORE: 50
ADLS_ACUITY_SCORE: 38
ADLS_ACUITY_SCORE: 49
ADLS_ACUITY_SCORE: 38
ADLS_ACUITY_SCORE: 49
ADLS_ACUITY_SCORE: 50
ADLS_ACUITY_SCORE: 49
ADLS_ACUITY_SCORE: 38
ADLS_ACUITY_SCORE: 50
ADLS_ACUITY_SCORE: 50
ADLS_ACUITY_SCORE: 49

## 2024-08-06 NOTE — ED NOTES
Daughter called and informed that the patient was brought into ED by EMS from facility for unwitnessed fall.Daughter spoke with SLAVA Voss about possible shoulder reduction.

## 2024-08-06 NOTE — H&P
[facility]    History and Physical - Hospitalist Service       Date of Admission:  8/5/2024    Assessment & Plan      Danni Alvarez is a 89 year old female admitted on 8/5/2024. She presents after a fall    Note: pt with dementia, unable to provide history    Subdural hematoma  Hx recurrent falls. Pt had unwitnessed fall at her memory care. Sustained head and L shoulder injuries. Unable to provide history. In ED AFVSS. Labs normal. EKG NSR.   *CT head with 3 mm subdural hematoma w/o mass effect. C-spine w/o fracture.   - NPO, IV fluids  - HOB>30 degrees  - BP<150  - q4 neuro checks  - repeat head CT at 6 am  - neurosurgery consult    L shoulder dislocation  XR w/ L glenohumeral dislocation, likely fx humeral head. Reduced in ED  - orthopedic surgery consult  - prn analgesics  - L arm in sling    Possible UTI  UA w/ 24 WBC, 4 RBC.   - await urine cx  - hold abx for now    Hypertension   - resume amlodipine with rec    Vascular dementia  [Needs rec- donepezil, quetiapine, sertraline]  Lives in memory care.   - resume meds with rec  - prn zyprexa available for agitation    Hypothyroidism  - resume levothyroxine with rec        Diet:  NPO, IV fluids  DVT Prophylaxis: Pneumatic Compression Devices  Alan Catheter: Not present  Lines: None     Cardiac Monitoring: None  Code Status:  DNR/DNI    Clinically Significant Risk Factors Present on Admission                         # DMII: A1C = 6.5 % (Ref range: <5.7 %) within past 6 months               Disposition Plan     Medically Ready for Discharge: Anticipated in 2-4 Days           John Alvarado MD  Hospitalist Service  [facility]  Securely message with [employer] (more info)  Text page via [employer] P[employer]/Directory     ______________________________________________________________________    Chief Complaint   Fall     History is obtained from the electronic health record and emergency department physician    History of  Present Illness   Danni Alvarez is a 89 year old female who presents after a fall at her memory care unit.  She has advanced dementia and is not able to provide history.  Patient has a history of falls.  Today she had an unwitnessed fall at her care facility.  She had head trauma as well as left shoulder injury and was complaining of left shoulder pain.  There is visible deformity after the fall.  She had denied other pain complaints.  At the time my visit she is pleasant but very confused and is unable to provide any history.      Past Medical History    Past Medical History:   Diagnosis Date    Abnormal feces     Abnormal glucose     Anxiety state, unspecified     Hyperlipidemia     Major depressive disorder, single episode, mild (H)     Malignant neoplasm of colon, unspecified site     Mild cognitive impairment, so stated     Unspecified hypothyroidism        Past Surgical History   Past Surgical History:   Procedure Laterality Date    CATARACT IOL, RT/LT      right and left    COLECTOMY      HERNIA REPAIR      REPAIR PTOSIS BILATERAL Bilateral 1/21/2016    Procedure: REPAIR PTOSIS BILATERAL;  Surgeon: Jose Stevens MD;  Location: [facility]    TUBAL LIGATION         Prior to Admission Medications   Prior to Admission Medications   Prescriptions Last Dose Informant Patient Reported? Taking?   Donepezil HCl (ARICEPT PO)   Yes No   Sig: Take 10 mg by mouth At Bedtime   Levothyroxine Sodium (LEVOTHROID PO)   Yes No   Sig: Take 50 mcg by mouth daily    QUEtiapine (SEROQUEL) 25 MG tablet   Yes No   Sig: Take 12.5 mg by mouth nightly as needed (agitation)   Vitamin D3 (CHOLECALCIFEROL) 25 mcg (1000 units) tablet   Yes No   Sig: Take 75 mcg by mouth daily   acetaminophen (TYLENOL) 325 MG tablet   Yes No   Sig: Take 650 mg by mouth 2 times daily   amLODIPine (NORVASC) 5 MG tablet   No No   Sig: Take 1 tablet (5 mg) by mouth daily   diclofenac (VOLTAREN) 1 % topical gel   Yes No   Sig: Apply 2 g topically 2 times daily  To R shoulder   senna-docusate (SENOKOT-S/PERICOLACE) 8.6-50 MG tablet   Yes No   Sig: Take 2 tablets by mouth Every Mon, Wed, Fri Morning   sertraline (ZOLOFT) 25 MG tablet   Yes No   Sig: Take 75 mg by mouth daily      Facility-Administered Medications: None        Review of Systems    Review of systems not obtained due to patient factors - mental status    Social History   I have reviewed this patient's social history and updated it with pertinent information if needed.  Social History     Tobacco Use    Smoking status: Never    Smokeless tobacco: Never   Substance Use Topics    Alcohol use: Yes     Comment: occasionally    Drug use: No        Physical Exam   Vital Signs: Temp: 98.7  F (37.1  C) Temp src: Temporal BP: 123/74 Pulse: 81   Resp: 12 SpO2: 91 % O2 Device: None (Room air)    Weight: 0 lbs 0 oz    General Appearance: Alert, no distress, confused  Respiratory: CTA B anteriorly  Cardiovascular: RRR. 1+ edema  GI: soft, nt/nd  Skin: no rashes or lesions grossly   Other: L shoulder sling in place. No other apparent bony tenderness     Medical Decision Making       60 MINUTES SPENT BY ME on the date of service doing chart review, history, exam, documentation & further activities per the note.      Data   ------------------------- PAST 24 HR DATA REVIEWED -----------------------------------------------    I have personally reviewed the following data over the past 24 hrs:    9.2  \   13.1   / 187     140 102 26.7 (H) /  158 (H)   5.0 25 0.94 \       Imaging results reviewed over the past 24 hrs:   Recent Results (from the past 24 hour(s))   XR Shoulder Left Port G/E 2 Views    Narrative    EXAM: XR SHOULDER LEFT PORT G/E 2 VIEWS  LOCATION: [facility]  DATE: 8/5/2024    INDICATION: fall, shoulder deformity, rule out fracture or dislocation  COMPARISON: None.      Impression    IMPRESSION: Anterior left glenohumeral joint dislocation. Likely Hill-Sachs impaction fracture of the humeral  head. Chronic healed lateral left clavicle fracture deformity. Mild arthritis at the AC joint. Diffuse bone demineralization. Atherosclerotic   vascular disease aortic knob.    NOTE: ABNORMAL REPORT    THE DICTATION ABOVE DESCRIBES AN ABNORMALITY FOR WHICH FOLLOW-UP IS NEEDED.    Shoulder XR, left  2-3 vw PORTABLE    Narrative    EXAM: XR SHOULDER LEFT PORT G/E 2 VIEWS  LOCATION: [facility]  DATE: 8/5/2024    INDICATION: Postreduction  COMPARISON: 08/05/2024      Impression    IMPRESSION: Interval reduction of dislocated left humeral head. Remote fracture left clavicle. Mild Hill-Sachs deformity humeral head not excluded.   Head CT w/o contrast    Narrative    EXAM: CT HEAD W/O CONTRAST, CT CERVICAL SPINE W/O CONTRAST  LOCATION: [facility]  DATE: 8/5/2024    INDICATION: Head and neck injury  COMPARISON: CT head 3/16/2023, CT cervical spine 1/11/2023  TECHNIQUE:   1) Routine CT Head without IV contrast. Multiplanar reformats. Dose reduction techniques were used.  2) Routine CT Cervical Spine without IV contrast. Multiplanar reformats. Dose reduction techniques were used.    FINDINGS:   HEAD CT:   INTRACRANIAL CONTENTS: Up to 3 mm acute extra-axial, most likely subdural hematoma overlying right parietal lobe (series 4 image 22). No CT evidence of acute infarct. Moderate presumed chronic small vessel ischemic changes. Moderate generalized volume   loss. No hydrocephalus. Atrophy is more prominent in anterior aspect of both temporal lobes. This is a nonspecific finding, similar finding could be observed in dementia of Alzheimer's type. This is similar to slightly more prominent compared with the   prior exam.     VISUALIZED ORBITS/SINUSES/MASTOIDS: No intraorbital abnormality. No paranasal sinus mucosal disease. No middle ear or mastoid effusion.    BONES/SOFT TISSUES: Mild soft tissue swelling/hematoma overlying left parietal bone.    CERVICAL SPINE CT:   VERTEBRA:  Normal vertebral body heights. Straightening of cervical lordosis. Slight anterior subluxation C3 on C4. Slight right cervical curve. No fracture or posttraumatic subluxation.     CANAL/FORAMINA: Moderate degenerative changes without significant canal narrowing at any level. Multilevel mild-to-moderate neural foraminal narrowing.    PARASPINAL: No extraspinal abnormality. Visualized lung fields are clear.      Impression    IMPRESSION:  HEAD CT:  1.  Up to 3 mm acute extra-axial, most likely subdural hematoma overlying right parietal lobe (series 4 image 22).  2.  No significant mass effect.    CERVICAL SPINE CT:  1.  No CT evidence for acute fracture or post traumatic subluxation.    Results were called to Dr. PRINCESS FAUSTIN at 8/5/2024 11:16 PM CDT.   CT Cervical Spine w/o Contrast    Narrative    EXAM: CT HEAD W/O CONTRAST, CT CERVICAL SPINE W/O CONTRAST  LOCATION: [facility]  DATE: 8/5/2024    INDICATION: Head and neck injury  COMPARISON: CT head 3/16/2023, CT cervical spine 1/11/2023  TECHNIQUE:   1) Routine CT Head without IV contrast. Multiplanar reformats. Dose reduction techniques were used.  2) Routine CT Cervical Spine without IV contrast. Multiplanar reformats. Dose reduction techniques were used.    FINDINGS:   HEAD CT:   INTRACRANIAL CONTENTS: Up to 3 mm acute extra-axial, most likely subdural hematoma overlying right parietal lobe (series 4 image 22). No CT evidence of acute infarct. Moderate presumed chronic small vessel ischemic changes. Moderate generalized volume   loss. No hydrocephalus. Atrophy is more prominent in anterior aspect of both temporal lobes. This is a nonspecific finding, similar finding could be observed in dementia of Alzheimer's type. This is similar to slightly more prominent compared with the   prior exam.     VISUALIZED ORBITS/SINUSES/MASTOIDS: No intraorbital abnormality. No paranasal sinus mucosal disease. No middle ear or mastoid  effusion.    BONES/SOFT TISSUES: Mild soft tissue swelling/hematoma overlying left parietal bone.    CERVICAL SPINE CT:   VERTEBRA: Normal vertebral body heights. Straightening of cervical lordosis. Slight anterior subluxation C3 on C4. Slight right cervical curve. No fracture or posttraumatic subluxation.     CANAL/FORAMINA: Moderate degenerative changes without significant canal narrowing at any level. Multilevel mild-to-moderate neural foraminal narrowing.    PARASPINAL: No extraspinal abnormality. Visualized lung fields are clear.      Impression    IMPRESSION:  HEAD CT:  1.  Up to 3 mm acute extra-axial, most likely subdural hematoma overlying right parietal lobe (series 4 image 22).  2.  No significant mass effect.    CERVICAL SPINE CT:  1.  No CT evidence for acute fracture or post traumatic subluxation.    Results were called to Dr. PRINCESS FAUSTIN at 8/5/2024 11:16 PM CDT.

## 2024-08-06 NOTE — PHARMACY-ADMISSION MEDICATION HISTORY
Pharmacist Admission Medication History    Admission medication history is complete. The information provided in this note is only as accurate as the sources available at the time of the update.    Information Source(s): Facility ([facility]/) medication list/MAR and CareEverywhere/SureScripts via phone    Pertinent Information: reviewed fill history in SureScripts, called staff to request fax of MAR three times without success. Called nurse at [facility] and verified medications below via phone readout.     Quetiapine 50 mg tablet filled 7/28/24 #28ds #7, 25 mg tablet filled 7/27/24 #28ds #14. Confirmed with staff at facility patient is being weaned off of this, taking 6.25 mg BID and 12.5 mg at night as needed (but patient has not needed nightly PRN dose lately).     Changes made to PTA medication list:  Added: abebe, carbamide ear drops, loperamide   Deleted: amlodipine 5 mg daily (no fill history in the last year, staff at facility confirm patient not taking)  Changed: quetiapine HS PRN --> BID in addition to HS PRN     Allergies reviewed with patient and updates made in EHR: no    Medication History Completed By: Rae Hung [facility] 8/6/2024 2:09 PM    PTA Med List   Medication Sig Note Last Dose    acetaminophen (TYLENOL) 325 MG tablet Take 650 mg by mouth 2 times daily  8/5/2024 at pm    carbamide peroxide (DEBROX) 6.5 % otic solution Place 5 drops into both ears See Admin Instructions Offered to patient MWF as needed 8/6/2024: Patient refused dose 8/5/24 per facility staff  Unknown at PRN    diclofenac (VOLTAREN) 1 % topical gel Apply 2 g topically 2 times daily To R shoulder  8/5/2024 at pm    dimethicone-zinc oxide (ABEBE PROTECT) external cream Apply topically 2 times daily as needed for dry skin or other  Unknown at PRN    donepezil (ARICEPT) 10 MG tablet Take 10 mg by mouth at bedtime  8/5/2024 at pm    levothyroxine (SYNTHROID/LEVOTHROID) 50 MCG tablet Take 50 mcg by mouth daily  8/5/2024 at  am    loperamide (IMODIUM) 2 MG capsule Take 2 mg by mouth daily as needed for diarrhea  Unknown at PRN    QUEtiapine (SEROQUEL) 25 MG tablet Take 6.25 mg by mouth 2 times daily  8/5/2024 at pm    QUEtiapine (SEROQUEL) 25 MG tablet Take 12.5 mg by mouth nightly as needed (agitation)  Unknown at PRN, but has not needed per facility    senna-docusate (SENOKOT-S/PERICOLACE) 8.6-50 MG tablet Take 2 tablets by mouth Every Mon, Wed, Fri Morning  8/5/2024 at am    sertraline (ZOLOFT) 25 MG tablet Take 75 mg by mouth daily  8/5/2024 at am    Vitamin D3 (CHOLECALCIFEROL) 25 mcg (1000 units) tablet Take 75 mcg by mouth daily  8/5/2024 at pm

## 2024-08-06 NOTE — CONSULTS
[facility]    Neurosurgery Consultation     Date of Admission:  8/5/2024  Date of Consult (When I saw the patient): 08/06/24    Assessment & Plan   Danni Alvarez is a 89 year old female who was admitted on 8/5/2024. Danni Alvarez is a 89 year old female with advanced dementia NOT anticoagulated presented overnight after a fall at her memory care unit with imaging evidence of small 3mm SDH overlying right parietal lobe.     Patient unable to provide history. 2 daughters advise patient does not appear to be in pain unless she moves her injured left shoulder.     Narrative & Impression   EXAM: CT HEAD W/O CONTRAST  LOCATION: [facility]  DATE: 8/6/2024                                                         IMPRESSION:  1.  Similar subdural hematoma over the right parietal convexity, measuring approximately 3 mm in radial thickness. No progressive mass effect.  2.  No new sites of acute intracranial hemorrhage or other new acute findings.  3.  Chronic changes as detailed above.     EXAM: CT HEAD W/O CONTRAST, CT CERVICAL SPINE W/O CONTRAST  LOCATION: [facility]  DATE: 8/5/2024                                                        IMPRESSION:  HEAD CT:  1.  Up to 3 mm acute extra-axial, most likely subdural hematoma overlying right parietal lobe (series 4 image 22).  2.  No significant mass effect.     CERVICAL SPINE CT:  1.  No CT evidence for acute fracture or post traumatic subluxation.     Results were called to Dr. PRINCESS FAUSTIN at 8/5/2024 11:16 PM CDT.    Clinical history, imaging and plans reviewed myself as well as with Dr. Rosales.    PLAN  -no surgery indicated  -rpt imaging stable  -light activity  -follow up in 1 month with head CT prior   -cleared from nsgy standpoint for discharge whenever medically cleared  -will sign off at this time, our team will coordinate follow up    I have discussed the following assessment and  plan with Dr. Rosales who is in agreement with the initial plan and will follow up with further consultation recommendations.    Soledad De La Rosa PA-C  [facility]  [address]  [address] [place] [address]  Tel [phone]  Fax [phone]  Text page via [facility] Paging/Directory  Code Status    No CPR- Do NOT Intubate    Reason for Consult   Reason for consult: I was asked by Dr. Alvarado to evaluate this patient for SDH.    Primary Care Physician   CHARMAINE GOODMAN    Chief Complaint   Fall     History is obtained from the electronic health record, emergency department physician, and patient's family    History of Present Illness   Danni Alvarez is a 89 year old female with advanced dementia NOT anticoagulated presented overnight after a fall at her memory care unit with imaging evidence of small 3mm SDH overlying right parietal lobe.     Patient unable to provide history. 2 daughters advise patient does not appear to be in pain unless she moves her injured left shoulder.     Narrative & Impression   EXAM: CT HEAD W/O CONTRAST  LOCATION: [facility]  DATE: 8/6/2024                                                         IMPRESSION:  1.  Similar subdural hematoma over the right parietal convexity, measuring approximately 3 mm in radial thickness. No progressive mass effect.  2.  No new sites of acute intracranial hemorrhage or other new acute findings.  3.  Chronic changes as detailed above.     EXAM: CT HEAD W/O CONTRAST, CT CERVICAL SPINE W/O CONTRAST  LOCATION: [facility]  DATE: 8/5/2024                                                        IMPRESSION:  HEAD CT:  1.  Up to 3 mm acute extra-axial, most likely subdural hematoma overlying right parietal lobe (series 4 image 22).  2.  No significant mass effect.     CERVICAL SPINE CT:  1.  No CT evidence for acute fracture or post traumatic subluxation.     Results were called to   PRINCESS FAUSTIN at 8/5/2024 11:16 PM CDT.    Past Medical History   I have reviewed this patient's medical history and updated it with pertinent information if needed.   Past Medical History:   Diagnosis Date    Abnormal feces     Abnormal glucose     Anxiety state, unspecified     Hyperlipidemia     Major depressive disorder, single episode, mild (H)     Malignant neoplasm of colon, unspecified site     Mild cognitive impairment, so stated     Unspecified hypothyroidism        Past Surgical History   I have reviewed this patient's surgical history and updated it with pertinent information if needed.  Past Surgical History:   Procedure Laterality Date    CATARACT IOL, RT/LT      right and left    COLECTOMY      HERNIA REPAIR      REPAIR PTOSIS BILATERAL Bilateral 1/21/2016    Procedure: REPAIR PTOSIS BILATERAL;  Surgeon: Jose Stevens MD;  Location: [facility]    TUBAL LIGATION         Prior to Admission Medications   Prior to Admission Medications   Prescriptions Last Dose Informant Patient Reported? Taking?   Donepezil HCl (ARICEPT PO)   Yes No   Sig: Take 10 mg by mouth At Bedtime   Levothyroxine Sodium (LEVOTHROID PO)   Yes No   Sig: Take 50 mcg by mouth daily    QUEtiapine (SEROQUEL) 25 MG tablet   Yes No   Sig: Take 12.5 mg by mouth nightly as needed (agitation)   Vitamin D3 (CHOLECALCIFEROL) 25 mcg (1000 units) tablet   Yes No   Sig: Take 75 mcg by mouth daily   acetaminophen (TYLENOL) 325 MG tablet   Yes No   Sig: Take 650 mg by mouth 2 times daily   amLODIPine (NORVASC) 5 MG tablet   No No   Sig: Take 1 tablet (5 mg) by mouth daily   diclofenac (VOLTAREN) 1 % topical gel   Yes No   Sig: Apply 2 g topically 2 times daily To R shoulder   senna-docusate (SENOKOT-S/PERICOLACE) 8.6-50 MG tablet   Yes No   Sig: Take 2 tablets by mouth Every Mon, Wed, Fri Morning   sertraline (ZOLOFT) 25 MG tablet   Yes No   Sig: Take 75 mg by mouth daily      Facility-Administered Medications: None     Allergies   Allergies    Allergen Reactions    Morphine        Social History   I have reviewed this patient's social history and updated it with pertinent information if needed. Danni Alvarez  reports that she has never smoked. She has never used smokeless tobacco. She reports current alcohol use. She reports that she does not use drugs.    Family History   I have reviewed this patient's family history and updated it with pertinent information if needed.   No family history on file.    Review of Systems    ROS: 10 point ROS neg other than the symptoms noted above in the HPI.    Physical Exam   Temp: 97.6  F (36.4  C) Temp src: Axillary BP: (!) 148/69 Pulse: 60   Resp: 18 SpO2: 99 % O2 Device: None (Room air)    Vital Signs with Ranges  Temp:  [97.2  F (36.2  C)-98.7  F (37.1  C)] 97.6  F (36.4  C)  Pulse:  [60-90] 60  Resp:  [] 18  BP: (112-158)/() 148/69  SpO2:  [84 %-99 %] 99 %  142 lbs 13.73 oz     , Blood pressure (!) 148/69, pulse 60, temperature 97.6  F (36.4  C), temperature source Axillary, resp. rate 18, weight 64.8 kg (142 lb 13.7 oz), SpO2 99%.  142 lbs 13.73 oz    NEUROLOGICAL EXAMINATION:     Awake, alert, pleasant, confused, follows simple commands  II-XII grossly intact  CABELLO symmetrically with pain limiting in left shoulder  Unable to participate in pronator drift, finger to nose testing     Data   All new lab and imaging data was personally reviewed by me.    Narrative & Impression   EXAM: CT HEAD W/O CONTRAST  LOCATION: [facility]  DATE: 8/6/2024     INDICATION: subdural hematoma  COMPARISON: Head CT: 8/5/2024.  TECHNIQUE: Routine CT Head without IV contrast. Multiplanar reformats. Dose reduction techniques were used.     FINDINGS:  INTRACRANIAL CONTENTS: Similar subdural hematoma over the right parietal convexity, which measures approximately 3 mm in maximum radial thickness (series 5/image 55). No progressive mass effect. No new sites of acute intracranial hemorrhage. No CT    evidence of acute infarct. Moderate presumed chronic small vessel ischemic changes. Moderate generalized volume loss, most pronounced along the anterior temporal lobes. No hydrocephalus. Calcified intracranial atherosclerosis.     VISUALIZED ORBITS/SINUSES/MASTOIDS: No intraorbital abnormality. No paranasal sinus mucosal disease. No middle ear or mastoid effusion.     BONES/SOFT TISSUES: Right parietal scalp contusion without subjacent calvarial fracture. Advanced bilateral TMJ degenerative changes.                                                                      IMPRESSION:     1.  Similar subdural hematoma over the right parietal convexity, measuring approximately 3 mm in radial thickness. No progressive mass effect.  2.  No new sites of acute intracranial hemorrhage or other new acute findings.  3.  Chronic changes as detailed above.     EXAM: CT HEAD W/O CONTRAST, CT CERVICAL SPINE W/O CONTRAST  LOCATION: [facility]  DATE: 8/5/2024     INDICATION: Head and neck injury  COMPARISON: CT head 3/16/2023, CT cervical spine 1/11/2023  TECHNIQUE:   1) Routine CT Head without IV contrast. Multiplanar reformats. Dose reduction techniques were used.  2) Routine CT Cervical Spine without IV contrast. Multiplanar reformats. Dose reduction techniques were used.     FINDINGS:   HEAD CT:   INTRACRANIAL CONTENTS: Up to 3 mm acute extra-axial, most likely subdural hematoma overlying right parietal lobe (series 4 image 22). No CT evidence of acute infarct. Moderate presumed chronic small vessel ischemic changes. Moderate generalized volume   loss. No hydrocephalus. Atrophy is more prominent in anterior aspect of both temporal lobes. This is a nonspecific finding, similar finding could be observed in dementia of Alzheimer's type. This is similar to slightly more prominent compared with the   prior exam.      VISUALIZED ORBITS/SINUSES/MASTOIDS: No intraorbital abnormality. No paranasal sinus mucosal  disease. No middle ear or mastoid effusion.     BONES/SOFT TISSUES: Mild soft tissue swelling/hematoma overlying left parietal bone.     CERVICAL SPINE CT:   VERTEBRA: Normal vertebral body heights. Straightening of cervical lordosis. Slight anterior subluxation C3 on C4. Slight right cervical curve. No fracture or posttraumatic subluxation.      CANAL/FORAMINA: Moderate degenerative changes without significant canal narrowing at any level. Multilevel mild-to-moderate neural foraminal narrowing.     PARASPINAL: No extraspinal abnormality. Visualized lung fields are clear.                                                                      IMPRESSION:  HEAD CT:  1.  Up to 3 mm acute extra-axial, most likely subdural hematoma overlying right parietal lobe (series 4 image 22).  2.  No significant mass effect.     CERVICAL SPINE CT:  1.  No CT evidence for acute fracture or post traumatic subluxation.     Results were called to Dr. PRINCESS FAUSTIN at 8/5/2024 11:16 PM CDT.  CBC RESULTS:   Recent Labs   Lab Test 08/05/24  2139   WBC 9.2   RBC 4.17   HGB 13.1   HCT 41.4   MCV 99   MCH 31.4   MCHC 31.6   RDW 14.1        Basic Metabolic Panel:  Lab Results   Component Value Date     08/05/2024     06/05/2019      Lab Results   Component Value Date    POTASSIUM 5.0 08/05/2024    POTASSIUM 4.1 01/13/2023    POTASSIUM 4.1 06/05/2019     Lab Results   Component Value Date    CHLORIDE 102 08/05/2024    CHLORIDE 103 01/13/2023    CHLORIDE 104 06/05/2019     Lab Results   Component Value Date    ELY 9.4 08/05/2024    ELY 9.2 06/05/2019     Lab Results   Component Value Date    CO2 25 08/05/2024    CO2 25 01/13/2023    CO2 27 06/05/2019     Lab Results   Component Value Date    BUN 26.7 08/05/2024    BUN 16 01/13/2023    BUN 14 06/05/2019     Lab Results   Component Value Date    CR 0.94 08/05/2024    CR 0.53 06/05/2019     Lab Results   Component Value Date     08/05/2024     01/13/2023    GLC  125 06/05/2019     INR:  Lab Results   Component Value Date    INR 0.95 01/11/2023    INR 0.94 05/15/2019

## 2024-08-06 NOTE — PLAN OF CARE
Pt here with SDH s/p fall. Unable to assess orientation. Neuros unable to assess due to inability to follow commands. VSS 1L NC. NPO. Up with ax1 GB. Denies pain. Pt scoring green on the Aggression Stop Light Tool. Repeat CT completed this am. Ortho consult for possible humeral head fx. Discharge pending.

## 2024-08-06 NOTE — ED PROVIDER NOTES
Emergency Department Note      History of Present Illness     Chief Complaint   Fall, Head Injury, and Shoulder Injury      HPI   Danni Alvarez is a 89 year old female with history of hyperlipidemia, hypothyroidism, rhabdomyolysis, vascular dementia, type 2 diabetes mellitus, and recurrent falls who presents to the ED via EMS from [facility] for evaluation of fall, head injury, and left shoulder injury. Per nurse present, patient presents following an unwitnessed fall around 2000 earlier tonight at her Memory Care Unit. She sustained a head and left shoulder injury with visible shoulder deformity. Help arrived quickly and EMS was called. 50 mcg fentanyl administered en route. Patient endorses significant pain to the left upper extremity. Denies neck pain, abdominal pain, or hip pain. Patient was seen here on the 7/25/24 for a fall.    Independent Historian   Nurse present as detailed above.    Review of External Notes   N/A    Past Medical History     Medical History and Problem List   Anxiety   Hyperlipidemia  Major depressive disorder  Malignant neoplasm of colon  Hypothyroidism  Rhabdomyolysis  Vitamin B12 deficiency  Cognitive impairment  GERD  Herpes simplex virus infection  Recurrent falls   Subdural hematoma   Subgaleal hemorrhage   Atherosclerosis of aorta  Type 2 diabetes mellitus   Vascular dementia     Medications   Amlodipine   Aricept   Levothroid  Seroquel  Zoloft    Aspirin 81 mg  Ditropan XL    Surgical History   Bilateral cataract IOL  Colectomy   Hernia repair   Repair ptosis bilateral  Tubal ligation     Physical Exam     Patient Vitals for the past 24 hrs:   BP Temp Temp src Pulse Resp SpO2   08/05/24 2218 -- -- -- 78 23 94 %   08/05/24 2215 -- -- -- 78 22 93 %   08/05/24 2205 (!) 158/105 -- -- 82 -- 97 %   08/05/24 2155 -- -- -- 84 28 97 %   08/05/24 2127 -- 97.2  F (36.2  C) Temporal -- -- --   08/05/24 2125 -- -- -- 72 25 --   08/05/24 2120 (!) 157/95 -- -- 79 (!) 109 (!) 84 %      Physical Exam    General: Alert and cooperative with exam. Patient in moderate distress. Baseline mentation. History of advanced dementia.  Head:  Scalp is NC/AT  Eyes:  No scleral icterus, PERRL  ENT:  The external nose and ears are normal. The oropharynx is normal and without erythema; mucus membranes are moist. Uvula midline, no evidence of deep space infection.  Neck:  Normal range of motion without rigidity.  CV:  Regular rate and rhythm  Resp:  Breath sounds are clear bilaterally    Non-labored, no retractions or accessory muscle use  GI:  Abdomen is soft, no distension, no tenderness. No peritoneal signs  MS:  No lower extremity edema. Deformity of left shoulder consistent with dislocation. CMS intact to all extremities.   Skin:  Warm and dry, No rash or lesions noted.  Neuro: Oriented to person only. No gross motor or sensory deficits.     Diagnostics     Lab Results   Labs Ordered and Resulted from Time of ED Arrival to Time of ED Departure   BASIC METABOLIC PANEL - Abnormal       Result Value    Sodium 140      Potassium 5.0      Chloride 102      Carbon Dioxide (CO2) 25      Anion Gap 13      Urea Nitrogen 26.7 (*)     Creatinine 0.94      GFR Estimate 57 (*)     Calcium 9.4      Glucose 158 (*)    ROUTINE UA WITH MICROSCOPIC - Abnormal    Color Urine Light Yellow      Appearance Urine Clear      Glucose Urine Negative      Bilirubin Urine Negative      Ketones Urine Trace (*)     Specific Gravity Urine 1.027      Blood Urine Negative      pH Urine 5.5      Protein Albumin Urine 30 (*)     Urobilinogen Urine Normal      Nitrite Urine Negative      Leukocyte Esterase Urine Small (*)     Mucus Urine Present (*)     RBC Urine 4 (*)     WBC Urine 24 (*)     Squamous Epithelials Urine 1     CBC WITH PLATELETS AND DIFFERENTIAL    WBC Count 9.2      RBC Count 4.17      Hemoglobin 13.1      Hematocrit 41.4      MCV 99      MCH 31.4      MCHC 31.6      RDW 14.1      Platelet Count 187      % Neutrophils 79       % Lymphocytes 16      % Monocytes 4      % Eosinophils 0      % Basophils 0      % Immature Granulocytes 0      NRBCs per 100 WBC 0      Absolute Neutrophils 7.2      Absolute Lymphocytes 1.5      Absolute Monocytes 0.4      Absolute Eosinophils 0.0      Absolute Basophils 0.0      Absolute Immature Granulocytes 0.0      Absolute NRBCs 0.0     URINE CULTURE       Imaging   CT Cervical Spine w/o Contrast   Final Result   IMPRESSION:   HEAD CT:   1.  Up to 3 mm acute extra-axial, most likely subdural hematoma overlying right parietal lobe (series 4 image 22).   2.  No significant mass effect.      CERVICAL SPINE CT:   1.  No CT evidence for acute fracture or post traumatic subluxation.      Results were called to Dr. PRINCESS FAUSTIN at 8/5/2024 11:16 PM CDT.      Head CT w/o contrast   Final Result   IMPRESSION:   HEAD CT:   1.  Up to 3 mm acute extra-axial, most likely subdural hematoma overlying right parietal lobe (series 4 image 22).   2.  No significant mass effect.      CERVICAL SPINE CT:   1.  No CT evidence for acute fracture or post traumatic subluxation.      Results were called to Dr. PRINCESS FAUSTIN at 8/5/2024 11:16 PM CDT.      Shoulder XR, left  2-3 vw PORTABLE   Final Result   IMPRESSION: Interval reduction of dislocated left humeral head. Remote fracture left clavicle. Mild Hill-Sachs deformity humeral head not excluded.      XR Shoulder Left Port G/E 2 Views   Final Result   IMPRESSION: Anterior left glenohumeral joint dislocation. Likely Hill-Sachs impaction fracture of the humeral head. Chronic healed lateral left clavicle fracture deformity. Mild arthritis at the AC joint. Diffuse bone demineralization. Atherosclerotic    vascular disease aortic knob.      NOTE: ABNORMAL REPORT      THE DICTATION ABOVE DESCRIBES AN ABNORMALITY FOR WHICH FOLLOW-UP IS NEEDED.           EKG     ECG results from 08/05/24   EKG 12-lead, tracing only     Value    Systolic Blood Pressure     Diastolic Blood  Pressure     Ventricular Rate 77    Atrial Rate 77    WA Interval 164    QRS Duration 72        QTc 484    P Axis 62    R AXIS 85    T Axis 85    Interpretation ECG      Sinus rhythm  Normal ECG  When compared with ECG of 11-Jan-2023 13:21,  QRS axis Shifted right  Minimal criteria for Inferior infarct are no longer Present  Taken at 2124. Read at 2124 by Marco Voss DO        Independent Interpretation   CT head shows small right-sided subdural hematoma  Left shoulder x-ray demonstrates evidence from shoulder dislocation.  Subsequent shoulder x-ray demonstrates adequate reduction without definitive fracture    ED Course      Medications Administered   Medications   fentaNYL (PF) (SUBLIMAZE) injection 50 mcg (50 mcg Intravenous $Given 8/5/24 2142)       Procedures   Procedures       Dislocation Reduction   Procedure: Dislocation Reduction  Consent: Verbal from Family  Risks Discussed: Pain, need for repeat attempts, fracture, neurovascular injury, unsuccessful attempts, and need to go to OR   Indication: Dislocated Shoulder, left   Location: Left Shoulder  Procedure Detail: Patient was provided 50 mcg fentanyl for pain control.  I manipulated the joint including external rotation and traction with reduction of shoulder dislocation.   Immobilized with Sling/shoulder immobilizer   Post procedure assessment:  Gross deformity resolved , Neurovascular intact , and ROM improved   Patient Status: The patient tolerated the procedure well: Yes. There were no complications.     Discussion of Management   I spoke with Ronny neurosurgery APC  I spoke with Dr. Alvarado, hospitalist    ED Course   ED Course as of 08/05/24 2344   Mon Aug 05, 2024   2120 I obtained history and examined the patient as noted above.    2132 I spoke with patient's daughter.       Additional Documentation  Recurrent falls, lives in a skilled nursing facility     Medical Decision Making / Diagnosis     CMS Diagnoses: None    MIPS        None    [facility]   Danni Alvarez is a 89 year old female who presents for evaluation of shoulder pain after unwitnessed fall; history of advanced dementia.  This is consistent by clinical and radiological exam with a shoulder dislocation. The dislocation was successfully reduced and a shoulder immobilizer/sling was placed.  The neurovascular exam is normal pre and post-reduction.  I will have patient stay in the immobilizer until follow-up with orthopedics.      Additionally the patient was reported to have potentially struck her head and CT head and neck was obtained.  Patient has no neck pain on exam and CT cervical spine is without evidence of fracture.  Head CT does demonstrate very small right-sided subdural hematoma.  This was discussed with on-call neurosurgery who recommended systolic blood pressure goal less than 150 and a repeat head CT in the morning.  Patient is not anticoagulated.  Patient is at her neurologic baseline.    The patient had had multiple frequent falls, labs and EKG were obtained without acute findings as above.  UA does demonstrate mild pyuria.  Will defer antibiotics at this time and urine was sent for culture.    I did discuss the patient's care with her daughter, Stephanie, who confirms DNR/DNI status.  Patient will be admitted to the hospitalist service for further evaluation and care. The head to toe trauma exam is otherwise normal and I doubt serious underlying spinal, chest, abdominal, pelvic or other extremity trauma.  She remained stable throughout my care and at time of admission was without complaint and did not appear in any significant distress.    Disposition   Patient was admitted to the hospitalist    Diagnosis     ICD-10-CM    1. Shoulder dislocation, left, initial encounter  S43.005A       2. Unwitnessed fall  R29.6       3. SDH (subdural hematoma) (H)  S06.5XAA       4. History of dementia  Z86.59       5. Falls frequently  R29.6           Scribe Disclosure:  I,  Ladan Espinal, am serving as a scribe at 9:31 PM on 8/5/2024 to document services personally performed by Marco Voss,  based on my observations and the provider's statements to me.        Marco Voss,   08/05/24 9790       Marco Voss,   08/05/24 1092

## 2024-08-06 NOTE — PLAN OF CARE
08/06/24 1410   Appointment Info   Signing Clinician's Name / Credentials (PT) Re Colon DPT   Living Environment   People in Home facility resident   Living Environment Comments pt from [facility] facility   Self-Care   Fall history within last six months yes   Number of times patient has fallen within last six months   (per chart pt has had multiple falls, recent ED visit for fall 7/25)   Activity/Exercise/Self-Care Comment pt from [facility], unable to provide any information, garbled speech; per chart review pt ambulates with FWW, has hx of frequent falls, unclear level of assist received at her [facility] facility   General Information   Onset of Illness/Injury or Date of Surgery 08/05/24   Referring Physician John Alvarado MD   Pertinent History of Current Problem (include personal factors and/or comorbidities that impact the POC) Danni Alvarez is a 89 year old female admitted from [facility] on 8/5/2024. She presents after a fall recurrence   Cognition   Affect/Mental Status (Cognition) confused   Follows Commands (Cognition) 0-24% accuracy   Safety Deficit (Cognition) impulsivity   Cognitive Status Comments Unable to state her birthday, very confused, illogical speech during session, pleasant, smiles a lot   Pain Assessment   Patient Currently in Pain Yes, see Vital Sign flowsheet  (L shoulder pain)   Integumentary/Edema   Integumentary/Edema Comments Sling not on correctly at start of session   Range of Motion (ROM)   ROM Comment BLE WFL able to obtain 90/90 position sitting EOB   Strength (Manual Muscle Testing)   Strength Comments Gross funcitonal weakness with OOB mobility   Bed Mobility   Comment, (Bed Mobility) A x 1   Transfers   Comment, (Transfers) STS HHA RUE mod A, braces LE against bed   Gait/Stairs (Locomotion)   Comment, (Gait/Stairs) min A, R HHA (LUE NWB unable to use walker), 5', shuffling gait, gross instability   Balance   Balance Comments Impaired dynamic  balance placing pt at increased risk of falls. Fall history   Clinical Impression   Criteria for Skilled Therapeutic Intervention Yes, treatment indicated   PT Diagnosis (PT) impaired IND with mobility from baseline   Influenced by the following impairments Functional weakness, imparied balance, imparied activity tolerance, dementia, unable to follow commands well during session   Functional limitations due to impairments Impaired IND with mobility   Clinical Presentation (PT Evaluation Complexity) evolving   Clinical Presentation Rationale NWB LUE, cognition, impaired balance, weakness, impaired activity tolerance   Clinical Decision Making (Complexity) moderate complexity   Planned Therapy Interventions (PT) balance training;bed mobility training;gait training;strengthening;transfer training;patient/family education   Risk & Benefits of therapy have been explained evaluation/treatment results reviewed;care plan/treatment goals reviewed;risks/benefits reviewed;patient   PT Total Evaluation Time   PT Joseluis, Moderate Complexity Minutes ([id number]) 10   Physical Therapy Goals   PT Frequency 3x/week   PT Predicted Duration/Target Date for Goal Attainment 08/13/24   PT Goals Bed Mobility;Transfers;Gait   PT: Bed Mobility Supervision/stand-by assist;Supine to/from sit;Within precautions   PT: Transfers Supervision/stand-by assist;Bed to/from chair;Sit to/from stand;Within precautions   PT: Gait Supervision/stand-by assist;100 feet;Within precautions;Quad cane   PT Discharge Planning   PT Plan Trial quad cane? vs HHA for progressing gait (uses walker at baseline but now NWB LUE) progress mobility as able   PT Discharge Recommendation (DC Rec) Long term care facility  (Memory care)   PT Rationale for DC Rec Pt admitted from memory care, has had multiple falls in the last 6 months per chart review. pt unable to provide any hx information, garbled speech, not following commands well during session, currently pt requires A x 1  with all mobility, NWB to LUE therefore unable to use walker. Pt currently requires 24/7 A with all mobility and ADLs. If memory care facility is able to provide this level of assist then recommend pt returns to memory care facility. Would benefit pt to return to familiar environment. If facility unable to accept pt back then pt will require TCU stay   PT Brief overview of current status A x 1 hand held assist, NWB LUE, unable to use walker. per chart uses walker at baseline   Total Session Time   Total Session Time (sum of timed and untimed services) 10

## 2024-08-06 NOTE — ED NOTES
[facility]  ED Nurse Handoff Report    ED Chief complaint: Fall, Head Injury, and Shoulder Injury      ED Diagnosis:   Final diagnoses:   Shoulder dislocation, left, initial encounter   Unwitnessed fall   SDH (subdural hematoma) (H)   History of dementia   Falls frequently       Code Status: Per the admitting provider     Allergies:   Allergies   Allergen Reactions    Morphine        Patient Story:   Pt unwitnessed fall  at her [facility] facility  Hx: Fall        Focused Assessment:   Pt poor historian, A/ O to self. Lungs clear bilaterally on auscultation.  CT of head showed 3 mm subdural hematoma. HOB elevated 35 degree.  L shoulder injuries.C-Spine  findings without fracture. Lt arm sling in placed.  + radial pulses, CMS intact . Pt able to move Lt arm. Lt arm elevated on x 1 pillow.     Treatments and/or interventions provided: Lab, EKG CT  Results for orders placed or performed during the hospital encounter of 08/05/24   Head CT w/o contrast     Status: None    Narrative    EXAM: CT HEAD W/O CONTRAST, CT CERVICAL SPINE W/O CONTRAST  LOCATION: [facility]  DATE: 8/5/2024    INDICATION: Head and neck injury  COMPARISON: CT head 3/16/2023, CT cervical spine 1/11/2023  TECHNIQUE:   1) Routine CT Head without IV contrast. Multiplanar reformats. Dose reduction techniques were used.  2) Routine CT Cervical Spine without IV contrast. Multiplanar reformats. Dose reduction techniques were used.    FINDINGS:   HEAD CT:   INTRACRANIAL CONTENTS: Up to 3 mm acute extra-axial, most likely subdural hematoma overlying right parietal lobe (series 4 image 22). No CT evidence of acute infarct. Moderate presumed chronic small vessel ischemic changes. Moderate generalized volume   loss. No hydrocephalus. Atrophy is more prominent in anterior aspect of both temporal lobes. This is a nonspecific finding, similar finding could be observed in dementia of Alzheimer's type. This is similar to  slightly more prominent compared with the   prior exam.     VISUALIZED ORBITS/SINUSES/MASTOIDS: No intraorbital abnormality. No paranasal sinus mucosal disease. No middle ear or mastoid effusion.    BONES/SOFT TISSUES: Mild soft tissue swelling/hematoma overlying left parietal bone.    CERVICAL SPINE CT:   VERTEBRA: Normal vertebral body heights. Straightening of cervical lordosis. Slight anterior subluxation C3 on C4. Slight right cervical curve. No fracture or posttraumatic subluxation.     CANAL/FORAMINA: Moderate degenerative changes without significant canal narrowing at any level. Multilevel mild-to-moderate neural foraminal narrowing.    PARASPINAL: No extraspinal abnormality. Visualized lung fields are clear.      Impression    IMPRESSION:  HEAD CT:  1.  Up to 3 mm acute extra-axial, most likely subdural hematoma overlying right parietal lobe (series 4 image 22).  2.  No significant mass effect.    CERVICAL SPINE CT:  1.  No CT evidence for acute fracture or post traumatic subluxation.    Results were called to Dr. PRINCESS FAUSTIN at 8/5/2024 11:16 PM CDT.   CT Cervical Spine w/o Contrast     Status: None    Narrative    EXAM: CT HEAD W/O CONTRAST, CT CERVICAL SPINE W/O CONTRAST  LOCATION: [facility]  DATE: 8/5/2024    INDICATION: Head and neck injury  COMPARISON: CT head 3/16/2023, CT cervical spine 1/11/2023  TECHNIQUE:   1) Routine CT Head without IV contrast. Multiplanar reformats. Dose reduction techniques were used.  2) Routine CT Cervical Spine without IV contrast. Multiplanar reformats. Dose reduction techniques were used.    FINDINGS:   HEAD CT:   INTRACRANIAL CONTENTS: Up to 3 mm acute extra-axial, most likely subdural hematoma overlying right parietal lobe (series 4 image 22). No CT evidence of acute infarct. Moderate presumed chronic small vessel ischemic changes. Moderate generalized volume   loss. No hydrocephalus. Atrophy is more prominent in anterior aspect of  both temporal lobes. This is a nonspecific finding, similar finding could be observed in dementia of Alzheimer's type. This is similar to slightly more prominent compared with the   prior exam.     VISUALIZED ORBITS/SINUSES/MASTOIDS: No intraorbital abnormality. No paranasal sinus mucosal disease. No middle ear or mastoid effusion.    BONES/SOFT TISSUES: Mild soft tissue swelling/hematoma overlying left parietal bone.    CERVICAL SPINE CT:   VERTEBRA: Normal vertebral body heights. Straightening of cervical lordosis. Slight anterior subluxation C3 on C4. Slight right cervical curve. No fracture or posttraumatic subluxation.     CANAL/FORAMINA: Moderate degenerative changes without significant canal narrowing at any level. Multilevel mild-to-moderate neural foraminal narrowing.    PARASPINAL: No extraspinal abnormality. Visualized lung fields are clear.      Impression    IMPRESSION:  HEAD CT:  1.  Up to 3 mm acute extra-axial, most likely subdural hematoma overlying right parietal lobe (series 4 image 22).  2.  No significant mass effect.    CERVICAL SPINE CT:  1.  No CT evidence for acute fracture or post traumatic subluxation.    Results were called to Dr. PRINCESS FAUSTIN at 8/5/2024 11:16 PM CDT.   XR Shoulder Left Port G/E 2 Views     Status: None    Narrative    EXAM: XR SHOULDER LEFT PORT G/E 2 VIEWS  LOCATION: [facility]  DATE: 8/5/2024    INDICATION: fall, shoulder deformity, rule out fracture or dislocation  COMPARISON: None.      Impression    IMPRESSION: Anterior left glenohumeral joint dislocation. Likely Hill-Sachs impaction fracture of the humeral head. Chronic healed lateral left clavicle fracture deformity. Mild arthritis at the AC joint. Diffuse bone demineralization. Atherosclerotic   vascular disease aortic knob.    NOTE: ABNORMAL REPORT    THE DICTATION ABOVE DESCRIBES AN ABNORMALITY FOR WHICH FOLLOW-UP IS NEEDED.    Shoulder XR, left  2-3 vw PORTABLE     Status:  None    Narrative    EXAM: XR SHOULDER LEFT PORT G/E 2 VIEWS  LOCATION: [facility]  DATE: 8/5/2024    INDICATION: Postreduction  COMPARISON: 08/05/2024      Impression    IMPRESSION: Interval reduction of dislocated left humeral head. Remote fracture left clavicle. Mild Hill-Sachs deformity humeral head not excluded.   Basic metabolic panel     Status: Abnormal   Result Value Ref Range    Sodium 140 135 - 145 mmol/L    Potassium 5.0 3.4 - 5.3 mmol/L    Chloride 102 98 - 107 mmol/L    Carbon Dioxide (CO2) 25 22 - 29 mmol/L    Anion Gap 13 7 - 15 mmol/L    Urea Nitrogen 26.7 (H) 8.0 - 23.0 mg/dL    Creatinine 0.94 0.51 - 0.95 mg/dL    GFR Estimate 57 (L) >60 mL/min/1.73m2    Calcium 9.4 8.8 - 10.4 mg/dL    Glucose 158 (H) 70 - 99 mg/dL   UA with Microscopic     Status: Abnormal   Result Value Ref Range    Color Urine Light Yellow Colorless, Straw, Light Yellow, Yellow    Appearance Urine Clear Clear    Glucose Urine Negative Negative mg/dL    Bilirubin Urine Negative Negative    Ketones Urine Trace (A) Negative mg/dL    Specific Gravity Urine 1.027 1.003 - 1.035    Blood Urine Negative Negative    pH Urine 5.5 5.0 - 7.0    Protein Albumin Urine 30 (A) Negative mg/dL    Urobilinogen Urine Normal Normal, 2.0 mg/dL    Nitrite Urine Negative Negative    Leukocyte Esterase Urine Small (A) Negative    Mucus Urine Present (A) None Seen /LPF    RBC Urine 4 (H) <=2 /HPF    WBC Urine 24 (H) <=5 /HPF    Squamous Epithelials Urine 1 <=1 /HPF   CBC with platelets and differential     Status: None   Result Value Ref Range    WBC Count 9.2 4.0 - 11.0 10e3/uL    RBC Count 4.17 3.80 - 5.20 10e6/uL    Hemoglobin 13.1 11.7 - 15.7 g/dL    Hematocrit 41.4 35.0 - 47.0 %    MCV 99 78 - 100 fL    MCH 31.4 26.5 - 33.0 pg    MCHC 31.6 31.5 - 36.5 g/dL    RDW 14.1 10.0 - 15.0 %    Platelet Count 187 150 - 450 10e3/uL    % Neutrophils 79 %    % Lymphocytes 16 %    % Monocytes 4 %    % Eosinophils 0 %    % Basophils 0 %     % Immature Granulocytes 0 %    NRBCs per 100 WBC 0 <1 /100    Absolute Neutrophils 7.2 1.6 - 8.3 10e3/uL    Absolute Lymphocytes 1.5 0.8 - 5.3 10e3/uL    Absolute Monocytes 0.4 0.0 - 1.3 10e3/uL    Absolute Eosinophils 0.0 0.0 - 0.7 10e3/uL    Absolute Basophils 0.0 0.0 - 0.2 10e3/uL    Absolute Immature Granulocytes 0.0 <=0.4 10e3/uL    Absolute NRBCs 0.0 10e3/uL   EKG 12-lead, tracing only     Status: None   Result Value Ref Range    Systolic Blood Pressure  mmHg    Diastolic Blood Pressure  mmHg    Ventricular Rate 77 BPM    Atrial Rate 77 BPM    MD Interval 164 ms    QRS Duration 72 ms     ms    QTc 484 ms    P Axis 62 degrees    R AXIS 85 degrees    T Axis 85 degrees    Interpretation ECG       Sinus rhythm  Normal ECG  When compared with ECG of 11-Jan-2023 13:21,  QRS axis Shifted right  Minimal criteria for Inferior infarct are no longer Present  Confirmed by GENERATED REPORT, COMPUTER ([id number]),  Gus Ernandez ([id number]) on 8/5/2024 10:35:46 PM     CBC with platelets + differential     Status: None    Narrative    The following orders were created for panel order CBC with platelets + differential.  Procedure                               Abnormality         Status                     ---------                               -----------         ------                     CBC with platelets and d...[[id number]]                      Final result                 Please view results for these tests on the individual orders.      Patient's response to treatments and/or interventions: Stable     To be done/followed up on inpatient unit:  See orders     Does this patient have any cognitive concerns?:  No     Activity level - Baseline/Home:  Stand with assist x2  Activity Level - Current:   Stand with assist x2    Patient's Preferred language: English   Needed?: No    Isolation: None  Infection: Not Applicable  Patient tested for COVID 19 prior to admission: YES  Bariatric?: No    Vital Signs:    Vitals:    08/06/24 0030 08/06/24 0045 08/06/24 0100 08/06/24 0115   BP:       Pulse: 78 75 75 90   Resp: 16 16 17 (!) 47   Temp:       TempSrc:       SpO2: 90% 92% 92% 90%       Cardiac Rhythm:SR 82    Was the PSS-3 completed:   Yes  What interventions are required if any?               Family Comments: Not present in ED   OBS brochure/video discussed/provided to patient/family: Yes              Name of person given brochure if not patient: N/A               Relationship to patient: N/A     For the majority of the shift this patient's behavior was Green.   Behavioral interventions performed were N?a .    ED NURSE PHONE NUMBER: *[id number]

## 2024-08-06 NOTE — PLAN OF CARE
Goal Outcome Evaluation:      Plan of Care Reviewed With: child, patient    Overall Patient Progress: no changeOverall Patient Progress: no change         Pt admitted for subdural hematoma; CT shows it is stable.     Pt oriented to self with first name; speech mostly garbled and incomprehensible.  Generalized weakness and difficulty with neuro assessments d/t inability to follow commands.  Pt restless at times with attempts to get out of bed.  VSS on room air.  Easy to chew bite size diet and thin liquids. Took pills whole this shift.  Up with assist of 1 with GB and walker.  Daughters are involved in pt's care.      No obvious signs of pain, arm sling on left shoulder for dislocation of shoulder.

## 2024-08-06 NOTE — PROGRESS NOTES
[facility] Neurosurgery  Telephone Note    Contacted by Dr. Weldon at [facility] ED.     Danni presented for evaluation after fall at her living facility. Hx of dementia. Found to have small SDH. She is a DNR DNI     Neuro exam per ED - Baseline    Imaging as stated below -  1.  Up to 3 mm acute extra-axial, most likely subdural hematoma overlying right parietal lobe (series 4 image 22).  2.  No significant mass effect.    Plan:   -OK for 7th floor  -Q4 neuro checks  -Repeat CT @ 0600   -HOB >30  -SBP <150      Florin Rice DNP  [facility] Neurosurgery  [address] [place] [id number]  Tel [phone]

## 2024-08-06 NOTE — PROGRESS NOTES
RECEIVING UNIT ED HANDOFF REVIEW    ED Nurse Handoff Report was reviewed by: Karl Collins RN on August 6, 2024 at 2:26 AM

## 2024-08-06 NOTE — PROGRESS NOTES
[id number]  DARRICK orientation, doesn't follow commands and speech is mostly incomprehensible. Moves all extremities purposefully. VSS on RA. A1 GB/ walker. Sling on LUE. Non weight bearing to LUE. Restless, bed alarm on for safety.

## 2024-08-06 NOTE — PROGRESS NOTES
[facility]    Medicine Progress Note - Hospitalist Service    Date of Admission:  8/5/2024    Assessment & Plan      Danni Alvarez is a 89 year old female admitted from [facility] care on 8/5/2024. She presents after a fall recurrence.     She is medically ready for d/c, need to ensure her facility is able to manage her current mobility restrictions.     Note: pt with dementia, unable to provide history    Subdural hematoma  Hx recurrent falls. Pt had unwitnessed fall at her [facility] care. Sustained head and L shoulder injuries. Unable to provide history. In ED AFVSS. Labs normal. EKG NSR.   *CT head with 3 mm subdural hematoma w/o mass effect. C-spine w/o fracture.     - HOB>30 degrees  - BP<150  - q4 neuro checks  - repeat head CT today, 8/6, is stable  - neurosurgery consult appreciated.  Follow up Neurosurgery in 1 months with Head CT prior  - ADAT today, soft mechdiet    L shoulder dislocation  S/p Colsed redduction of L shoulder dislocation.   Possible Mild L humeral head impaction fx  Old healed L distal clavical shaft fracture  XR w/ L glenohumeral dislocation, likely fx humeral head. Reduced in ED  - orthopedic surgery consult appreciated.  Per orthopedics, patient has the following restrictions:   -Sling or shoulder immobilizer x 1 week. Okay to have off in bed if tolerated, but should be worn when the patient is ambulatory and sitting in a chair for added support. After 1 week, she may wean from the sling's use as tolerated.  -No weight bearing or use of a walker to the LUE x 1 week, then can advance activities as tolerated thereafter.   -Avoid behind the back and external rotation >30 degrees of the left shoulder until symptoms improve. Otherwise, okay for Codman's and gentle ROMAT after 1 week of sling use.     - prn analgesics - minimize/avoid narcotic use  - Ortho has signed off.     - Follow up prn with Dr. Nirmal Beltran at [facility] for the left shoulder as  needed. Please call [phone] to schedule.     Possible UTI  UA w/ 24 WBC, 4 RBC.   - await urine cx  - hold abx for now    Hypertension   - not on meds at this time.    - prn labetelol    Vascular dementia  MDD  Lives in memory care.   - taking aricept .  Discussed with daughters at bedside.   Side effects = loose stools.  They may discuss with PCP about stopping  - continue scheduled bid seroquel as well as  prn seroquel available for agitation  - continue zoloft  - prn zyprexa has been ordered for  agitation:  consider d/c if no longer needed.   Was used once today, 8/6/24, before reconciliation of the usual seroquel...     Hypothyroidism   TSH 1.42 5/9/24  - resume levothyroxine            Diet: Mechanical/Dental Soft Diet    DVT Prophylaxis: Pneumatic Compression Devices  Alan Catheter: Not present  Lines: None     Cardiac Monitoring: None  Code Status: No CPR- Do NOT Intubate      Clinically Significant Risk Factors Present on Admission                         # DMII: A1C = 6.5 % (Ref range: <5.7 %) within past 6 months               Disposition Plan     Medically Ready for Discharge: Anticipated Tomorrow             Loree Anderson MD  Hospitalist Service  [facility]  Securely message with [employer] (more info)  Text page via [employer] Paging/Directory   ______________________________________________________________________    Interval History   Patient now has imobilizer for L scapula fx.  S/p closed reduction.  Denies pain.  Has appetite    Physical Exam   Vital Signs: Temp: 97.6  F (36.4  C) Temp src: Axillary BP: (!) 148/69 Pulse: 60   Resp: 18 SpO2: 99 % O2 Device: None (Room air)    Weight: 142 lbs 13.73 oz    Supportive daughters at bedside.   Constitutional: awake, alert, cooperative, no apparent distress, and appears stated age  Respiratory: No increased work of breathing, good air exchange, clear to auscultation bilaterally, no crackles or wheezing  Cardiovascular: Normal  apical impulse, regular rate and rhythm, normal S1 and S2, no S3 or S4, and no murmur noted  Musculoskeletal: L shoulder/arm immobilizer.   no lower extremity pitting edema present  Neurologic: alert, oriented to self.   Unaware of situation.  Mild-moderate expressive aphasia.     Medical Decision Making       55 MINUTES SPENT BY ME on the date of service doing chart review, history, exam, documentation & further activities per the note.      Data     I have personally reviewed the following data over the past 24 hrs:    9.2  \   13.1   / 187     140 102 26.7 (H) /  158 (H)   5.0 25 0.94 \       Imaging results reviewed over the past 24 hrs:   Recent Results (from the past 24 hour(s))   XR Shoulder Left Port G/E 2 Views    Narrative    EXAM: XR SHOULDER LEFT PORT G/E 2 VIEWS  LOCATION: [facility]  DATE: 8/5/2024    INDICATION: fall, shoulder deformity, rule out fracture or dislocation  COMPARISON: None.      Impression    IMPRESSION: Anterior left glenohumeral joint dislocation. Likely Hill-Sachs impaction fracture of the humeral head. Chronic healed lateral left clavicle fracture deformity. Mild arthritis at the AC joint. Diffuse bone demineralization. Atherosclerotic   vascular disease aortic knob.    NOTE: ABNORMAL REPORT    THE DICTATION ABOVE DESCRIBES AN ABNORMALITY FOR WHICH FOLLOW-UP IS NEEDED.    Shoulder XR, left  2-3 vw PORTABLE    Narrative    EXAM: XR SHOULDER LEFT PORT G/E 2 VIEWS  LOCATION: [facility]  DATE: 8/5/2024    INDICATION: Postreduction  COMPARISON: 08/05/2024      Impression    IMPRESSION: Interval reduction of dislocated left humeral head. Remote fracture left clavicle. Mild Hill-Sachs deformity humeral head not excluded.   Head CT w/o contrast    Narrative    EXAM: CT HEAD W/O CONTRAST, CT CERVICAL SPINE W/O CONTRAST  LOCATION: [facility]  DATE: 8/5/2024    INDICATION: Head and neck injury  COMPARISON: CT head  3/16/2023, CT cervical spine 1/11/2023  TECHNIQUE:   1) Routine CT Head without IV contrast. Multiplanar reformats. Dose reduction techniques were used.  2) Routine CT Cervical Spine without IV contrast. Multiplanar reformats. Dose reduction techniques were used.    FINDINGS:   HEAD CT:   INTRACRANIAL CONTENTS: Up to 3 mm acute extra-axial, most likely subdural hematoma overlying right parietal lobe (series 4 image 22). No CT evidence of acute infarct. Moderate presumed chronic small vessel ischemic changes. Moderate generalized volume   loss. No hydrocephalus. Atrophy is more prominent in anterior aspect of both temporal lobes. This is a nonspecific finding, similar finding could be observed in dementia of Alzheimer's type. This is similar to slightly more prominent compared with the   prior exam.     VISUALIZED ORBITS/SINUSES/MASTOIDS: No intraorbital abnormality. No paranasal sinus mucosal disease. No middle ear or mastoid effusion.    BONES/SOFT TISSUES: Mild soft tissue swelling/hematoma overlying left parietal bone.    CERVICAL SPINE CT:   VERTEBRA: Normal vertebral body heights. Straightening of cervical lordosis. Slight anterior subluxation C3 on C4. Slight right cervical curve. No fracture or posttraumatic subluxation.     CANAL/FORAMINA: Moderate degenerative changes without significant canal narrowing at any level. Multilevel mild-to-moderate neural foraminal narrowing.    PARASPINAL: No extraspinal abnormality. Visualized lung fields are clear.      Impression    IMPRESSION:  HEAD CT:  1.  Up to 3 mm acute extra-axial, most likely subdural hematoma overlying right parietal lobe (series 4 image 22).  2.  No significant mass effect.    CERVICAL SPINE CT:  1.  No CT evidence for acute fracture or post traumatic subluxation.    Results were called to Dr. PRINCESS FAUSTIN at 8/5/2024 11:16 PM CDT.   CT Cervical Spine w/o Contrast    Narrative    EXAM: CT HEAD W/O CONTRAST, CT CERVICAL SPINE W/O  CONTRAST  LOCATION: [facility]  DATE: 8/5/2024    INDICATION: Head and neck injury  COMPARISON: CT head 3/16/2023, CT cervical spine 1/11/2023  TECHNIQUE:   1) Routine CT Head without IV contrast. Multiplanar reformats. Dose reduction techniques were used.  2) Routine CT Cervical Spine without IV contrast. Multiplanar reformats. Dose reduction techniques were used.    FINDINGS:   HEAD CT:   INTRACRANIAL CONTENTS: Up to 3 mm acute extra-axial, most likely subdural hematoma overlying right parietal lobe (series 4 image 22). No CT evidence of acute infarct. Moderate presumed chronic small vessel ischemic changes. Moderate generalized volume   loss. No hydrocephalus. Atrophy is more prominent in anterior aspect of both temporal lobes. This is a nonspecific finding, similar finding could be observed in dementia of Alzheimer's type. This is similar to slightly more prominent compared with the   prior exam.     VISUALIZED ORBITS/SINUSES/MASTOIDS: No intraorbital abnormality. No paranasal sinus mucosal disease. No middle ear or mastoid effusion.    BONES/SOFT TISSUES: Mild soft tissue swelling/hematoma overlying left parietal bone.    CERVICAL SPINE CT:   VERTEBRA: Normal vertebral body heights. Straightening of cervical lordosis. Slight anterior subluxation C3 on C4. Slight right cervical curve. No fracture or posttraumatic subluxation.     CANAL/FORAMINA: Moderate degenerative changes without significant canal narrowing at any level. Multilevel mild-to-moderate neural foraminal narrowing.    PARASPINAL: No extraspinal abnormality. Visualized lung fields are clear.      Impression    IMPRESSION:  HEAD CT:  1.  Up to 3 mm acute extra-axial, most likely subdural hematoma overlying right parietal lobe (series 4 image 22).  2.  No significant mass effect.    CERVICAL SPINE CT:  1.  No CT evidence for acute fracture or post traumatic subluxation.    Results were called to Dr. PRINCESS FAUSTIN at  8/5/2024 11:16 PM CDT.   CT Head w/o Contrast    Narrative    EXAM: CT HEAD W/O CONTRAST  LOCATION: [facility]  DATE: 8/6/2024    INDICATION: subdural hematoma  COMPARISON: Head CT: 8/5/2024.  TECHNIQUE: Routine CT Head without IV contrast. Multiplanar reformats. Dose reduction techniques were used.    FINDINGS:  INTRACRANIAL CONTENTS: Similar subdural hematoma over the right parietal convexity, which measures approximately 3 mm in maximum radial thickness (series 5/image 55). No progressive mass effect. No new sites of acute intracranial hemorrhage. No CT   evidence of acute infarct. Moderate presumed chronic small vessel ischemic changes. Moderate generalized volume loss, most pronounced along the anterior temporal lobes. No hydrocephalus. Calcified intracranial atherosclerosis.    VISUALIZED ORBITS/SINUSES/MASTOIDS: No intraorbital abnormality. No paranasal sinus mucosal disease. No middle ear or mastoid effusion.    BONES/SOFT TISSUES: Right parietal scalp contusion without subjacent calvarial fracture. Advanced bilateral TMJ degenerative changes.      Impression    IMPRESSION:    1.  Similar subdural hematoma over the right parietal convexity, measuring approximately 3 mm in radial thickness. No progressive mass effect.  2.  No new sites of acute intracranial hemorrhage or other new acute findings.  3.  Chronic changes as detailed above.

## 2024-08-06 NOTE — ED NOTES
Bed: ST02  Expected date:   Expected time:   Means of arrival:   Comments:  [age]F shoulder injury from a fall, dislocated. Head strike, hypertensive. (Med overflow)

## 2024-08-06 NOTE — ED TRIAGE NOTES
[facility]  ED Arrival Note      Means of Arrival: EMS  Comes from: Nursing Home    Story:Pt brought in by EMS from nursing home, memory care unit. Pt had unwitnessed fall around 8 pm  head injury, left shoulder deformity. Pt is not currently taking blood thinners as per EMS report        EMS/PD Interventions: PIV 20  G right hand   EMS Medications: Fentanyl 50 mcg    Meets Stroke Criteria? No  Meets Trauma Criteria? No  Shock Index (HR/SBP): N/A      Directed to: Stabilization room  Belongings: Remain with patient

## 2024-08-06 NOTE — CONSULTS
[facility]    Orthopedic Consultation    Danni Alvarez MRN# [id number]   Age: 89 year old YOB: 1933     Date of Admission: 8/5/2024    Reason for consult: Left shoulder dislocation with possible fracture s/p closed reduction       Requesting physician: John Alvarado MD       Level of consult: One-time consult to assist in determining a diagnosis and to recommend an appropriate treatment plan           Assessment and Plan:   Assessment:   Acute, closed left shoulder anterior dislocation s/p successful closed reduction  Possible mild left humeral head Hill-Sachs impaction fracture  Age-indeterminate, likely chronic/healed, left distal clavicle shaft fracture  Recurrent falls      Plan:   The patient's history and clinical/diagnostic findings were reviewed with the on-call orthopedic trauma surgeon, Dr. Nirmal Beltran. The patient has a history of recurrent falls and sustained an unwitnessed fall at her [facility] facility on 8/5/24. Radiographs consistent with a left anterior shoulder dislocation for which she underwent a successful closed reduction in the ED. Patient also notably has remote (likely chronic) left distal clavicle shaft fracture and acute mild Hill-Sachs impaction fracture. NVI to the LUE. She was also noted to have a SDH and neurosurgery is consulted for further recommendations. Regarding the left shoulder, I reviewed images with Dr. Beltran who recommends nonoperative management. I informed the patient's daughter of this, who is pleased with this plan, but we did review that the patient is now at increased risk of ongoing left shoulder instability and recurrent dislocations. Recommendations as follows:    -Okay for diet from an orthopedic standpoint.  -Sling or shoulder immobilizer x 1 week. Okay to have off in bed if tolerated, but should be worn when the patient is ambulatory and sitting in a chair for added support. After 1 week, she may wean from  the sling's use as tolerated.  -No weight bearing or use of a walker to the LUE x 1 week, then can advance activities as tolerated thereafter.   -Avoid behind the back and external rotation >30 degrees of the left shoulder until symptoms improve. Otherwise, okay for Codman's and gentle ROMAT after 1 week of sling use.   -Analgesics per medicine team. Minimize opioids as able. Encourage use of cold compresses.   -Recommend PT and OT consults.  -Disposition per primary team.  -Follow up with Dr. Nirmal Beltran at [facility] for the left shoulder as needed. Please call [phone] to schedule. Ortho team will sign off this hospitalization.    Please contact orthopedic trauma team if any questions or concerns arise.           Chief Complaint:   Left shoulder injury         History of Present Illness:   Medical history obtained via chart review and discussion with the patient's daughter, Stephanie, at bedside. Danni Alvarez is a 89 year old female with past medical history of HTN, vascular dementia, hypothyroidism, and recurrent falls who was admitted on 8/5/24 for a subdural hematoma, possible UTI, and left shoulder dislocation s/p successful closed reduction while in the ED. On 8/5/24 around 2000, the patient was found down by staff at her memory care facility after a suspected unwitnessed fall. She was taken by EMS to [facility] ED where a head CT was significant for a subdural hematoma and radiographs of the left shoulder demonstrated an anterior dislocation, mild Hill-Sachs impaction fracture, and remote left distal clavicle fracture. She underwent a successful closed reduction of the left shoulder while in the ED. Patient admitted for pain control, neurologic monitoring, and further management. Currently, the patient seems to be comfortable in bed at rest. She does react and call out in pain while RN adjusts tape to IV. No prior surgeries to the left shoulder. No PTA anticoagulants. Currently NPO per  medicine team.          Past Medical History:     Past Medical History:   Diagnosis Date    Abnormal feces     Abnormal glucose     Anxiety state, unspecified     Hyperlipidemia     Major depressive disorder, single episode, mild (H)     Malignant neoplasm of colon, unspecified site     Mild cognitive impairment, so stated     Unspecified hypothyroidism              Past Surgical History:     Past Surgical History:   Procedure Laterality Date    CATARACT IOL, RT/LT      right and left    COLECTOMY      HERNIA REPAIR      REPAIR PTOSIS BILATERAL Bilateral 1/21/2016    Procedure: REPAIR PTOSIS BILATERAL;  Surgeon: Jose Stevens MD;  Location: [facility]    TUBAL LIGATION               Social History:     Social History     Tobacco Use    Smoking status: Never    Smokeless tobacco: Never   Substance Use Topics    Alcohol use: Yes     Comment: occasionally             Family History:   No family history on file.          Immunizations:     VACCINE/DOSE   Diptheria   DPT   DTAP   HBIG   Hepatitis A   Hepatitis B   HIB   Influenza   Measles   Meningococcal   MMR   Mumps   Pneumococcal   Polio   Rubella   Small Pox   TDAP   Varicella   Zoster             Allergies:     Allergies   Allergen Reactions    Morphine              Medications:     Current Facility-Administered Medications   Medication Dose Route Frequency Provider Last Rate Last Admin    acetaminophen (TYLENOL) tablet 650 mg  650 mg Oral Q4H PRN Jhon Alvarado MD        Or    acetaminophen (TYLENOL) Suppository 650 mg  650 mg Rectal Q4H PRN John Alvarado MD        calcium carbonate (TUMS) chewable tablet 1,000 mg  1,000 mg Oral 4x Daily PRN John Alvarado MD        hydrALAZINE (APRESOLINE) tablet 10 mg  10 mg Oral Q4H PRN John Alvarado MD        Or    hydrALAZINE (APRESOLINE) injection 10 mg  10 mg Intravenous Q4H PRN John Alvarado MD        HYDROmorphone (DILAUDID) injection 0.2 mg  0.2 mg Intravenous Q2H PRN  John Alvarado MD        HYDROmorphone (DILAUDID) injection 0.4 mg  0.4 mg Intravenous Q2H PRN John Alvarado MD        labetalol (NORMODYNE/TRANDATE) injection 10 mg  10 mg Intravenous Q2H PRN John Alvarado MD        lidocaine (LMX4) cream   Topical Q1H PRN John Alvarado MD        lidocaine 1 % 0.1-1 mL  0.1-1 mL Other Q1H PRN John Alvarado MD        naloxone (NARCAN) injection 0.2 mg  0.2 mg Intravenous Q2 Min PRN John Alvarado MD        Or    naloxone (NARCAN) injection 0.4 mg  0.4 mg Intravenous Q2 Min PRN John Alvarado MD        Or    naloxone (NARCAN) injection 0.2 mg  0.2 mg Intramuscular Q2 Min PRN John Alvarado MD        Or    naloxone (NARCAN) injection 0.4 mg  0.4 mg Intramuscular Q2 Min PRN John Alvarado MD        OLANZapine (zyPREXA) injection 5 mg  5 mg Intramuscular BID PRN John Alvarado MD        ondansetron (ZOFRAN ODT) ODT tab 4 mg  4 mg Oral Q6H PRN John Alvarado MD        Or    ondansetron (ZOFRAN) injection 4 mg  4 mg Intravenous Q6H PRN John Alvarado MD        senna-docusate (SENOKOT-S/PERICOLACE) 8.6-50 MG per tablet 1 tablet  1 tablet Oral or Feeding Tube BID PRN John Alvarado MD        Or    senna-docusate (SENOKOT-S/PERICOLACE) 8.6-50 MG per tablet 2 tablet  2 tablet Oral or Feeding Tube BID PRN John Alvarado MD        sodium chloride (PF) 0.9% PF flush 3 mL  3 mL Intracatheter Q8H John Alvarado MD   3 mL at 08/06/24 0245    sodium chloride (PF) 0.9% PF flush 3 mL  3 mL Intracatheter q1 min prn John Alvarado MD        sodium chloride 0.9 % infusion   Intravenous Continuous John Alvarado  mL/hr at 08/06/24 0244 New Bag at 08/06/24 0244             Review of Systems:   Unable to obtain reliable ROS due to patient's history of vascular dementia.          Physical Exam:   All vitals have been  "reviewed  Patient Vitals for the past 24 hrs:   BP Temp Temp src Pulse Resp SpO2 Weight   08/06/24 0741 (!) 148/69 97.6  F (36.4  C) Axillary 60 18 99 % --   08/06/24 0233 112/82 97.3  F (36.3  C) Oral 63 20 93 % 64.8 kg (142 lb 13.7 oz)   08/06/24 0215 126/83 97.8  F (36.6  C) Temporal 75 22 97 % --   08/06/24 0200 -- -- -- 70 21 94 % --   08/06/24 0145 -- -- -- 84 23 94 % --   08/06/24 0130 -- -- -- 81 25 96 % --   08/06/24 0115 -- -- -- 90 (!) 47 90 % --   08/06/24 0100 -- -- -- 75 17 92 % --   08/06/24 0045 -- -- -- 75 16 92 % --   08/06/24 0030 -- -- -- 78 16 90 % --   08/06/24 0015 124/72 -- -- 77 18 92 % --   08/05/24 2348 123/74 98.7  F (37.1  C) Temporal 81 12 91 % --   08/05/24 2218 -- -- -- 78 23 94 % --   08/05/24 2215 -- -- -- 78 22 93 % --   08/05/24 2205 (!) 158/105 -- -- 82 -- 97 % --   08/05/24 2155 -- -- -- 84 28 97 % --   08/05/24 2127 -- 97.2  F (36.2  C) Temporal -- -- -- --   08/05/24 2125 -- -- -- 72 25 -- --   08/05/24 2120 (!) 157/95 -- -- 79 (!) 109 (!) 84 % --     No intake or output data in the 24 hours ending 08/06/24 0951    Constitutional: Unintelligible speech at most times. Baseline dementia. No acute distress.  HEENT: Head atraumatic normocephalic. Pupils equal and round.  Respiratory: Unlabored breathing no audible wheeze  Cardiovascular: Regular rate and rhythm per pulses.  GI: Abdomen is non-distended.  Lymph/Hematologic: No lymphadenopathy in areas examined.  Skin: No rashes, no cyanosis, no edema.  Musculoskeletal: Left upper extremity: Band-aid intact to the left lateral elbow. Skin intact to the examined areas. No apparent erythema or ecchymosis to the examined areas. Minimal swelling of the left proximal humeral region. Upper arm and forearm compartments are soft and compressible. Grimaces and says \"it hurts\" with light touch to the left proximal humeral region. No apparent tenderness to the clavicle, acromion, AC joint, biceps, triceps, olecranon, and medial and lateral " epicondyles. No attempts made to range the left shoulder. Patient is able to actively range the elbow, wrist, and all digits but limited due to pain and hesitation. Does not follow commands to complete specific ranges of motion or  my fingers. Radial and ulnar pulses intact. Digits are warm and well-perfused. Unable to reliably assess sensation due to dementia.  Neurologic: GCS 15          Data:   All laboratory data reviewed  Results for orders placed or performed during the hospital encounter of 08/05/24   Head CT w/o contrast     Status: None    Narrative    EXAM: CT HEAD W/O CONTRAST, CT CERVICAL SPINE W/O CONTRAST  LOCATION: [facility]  DATE: 8/5/2024    INDICATION: Head and neck injury  COMPARISON: CT head 3/16/2023, CT cervical spine 1/11/2023  TECHNIQUE:   1) Routine CT Head without IV contrast. Multiplanar reformats. Dose reduction techniques were used.  2) Routine CT Cervical Spine without IV contrast. Multiplanar reformats. Dose reduction techniques were used.    FINDINGS:   HEAD CT:   INTRACRANIAL CONTENTS: Up to 3 mm acute extra-axial, most likely subdural hematoma overlying right parietal lobe (series 4 image 22). No CT evidence of acute infarct. Moderate presumed chronic small vessel ischemic changes. Moderate generalized volume   loss. No hydrocephalus. Atrophy is more prominent in anterior aspect of both temporal lobes. This is a nonspecific finding, similar finding could be observed in dementia of Alzheimer's type. This is similar to slightly more prominent compared with the   prior exam.     VISUALIZED ORBITS/SINUSES/MASTOIDS: No intraorbital abnormality. No paranasal sinus mucosal disease. No middle ear or mastoid effusion.    BONES/SOFT TISSUES: Mild soft tissue swelling/hematoma overlying left parietal bone.    CERVICAL SPINE CT:   VERTEBRA: Normal vertebral body heights. Straightening of cervical lordosis. Slight anterior subluxation C3 on C4. Slight right  cervical curve. No fracture or posttraumatic subluxation.     CANAL/FORAMINA: Moderate degenerative changes without significant canal narrowing at any level. Multilevel mild-to-moderate neural foraminal narrowing.    PARASPINAL: No extraspinal abnormality. Visualized lung fields are clear.      Impression    IMPRESSION:  HEAD CT:  1.  Up to 3 mm acute extra-axial, most likely subdural hematoma overlying right parietal lobe (series 4 image 22).  2.  No significant mass effect.    CERVICAL SPINE CT:  1.  No CT evidence for acute fracture or post traumatic subluxation.    Results were called to Dr. PRINCESS FAUSTIN at 8/5/2024 11:16 PM CDT.   CT Cervical Spine w/o Contrast     Status: None    Narrative    EXAM: CT HEAD W/O CONTRAST, CT CERVICAL SPINE W/O CONTRAST  LOCATION: [facility]  DATE: 8/5/2024    INDICATION: Head and neck injury  COMPARISON: CT head 3/16/2023, CT cervical spine 1/11/2023  TECHNIQUE:   1) Routine CT Head without IV contrast. Multiplanar reformats. Dose reduction techniques were used.  2) Routine CT Cervical Spine without IV contrast. Multiplanar reformats. Dose reduction techniques were used.    FINDINGS:   HEAD CT:   INTRACRANIAL CONTENTS: Up to 3 mm acute extra-axial, most likely subdural hematoma overlying right parietal lobe (series 4 image 22). No CT evidence of acute infarct. Moderate presumed chronic small vessel ischemic changes. Moderate generalized volume   loss. No hydrocephalus. Atrophy is more prominent in anterior aspect of both temporal lobes. This is a nonspecific finding, similar finding could be observed in dementia of Alzheimer's type. This is similar to slightly more prominent compared with the   prior exam.     VISUALIZED ORBITS/SINUSES/MASTOIDS: No intraorbital abnormality. No paranasal sinus mucosal disease. No middle ear or mastoid effusion.    BONES/SOFT TISSUES: Mild soft tissue swelling/hematoma overlying left parietal bone.    CERVICAL  SPINE CT:   VERTEBRA: Normal vertebral body heights. Straightening of cervical lordosis. Slight anterior subluxation C3 on C4. Slight right cervical curve. No fracture or posttraumatic subluxation.     CANAL/FORAMINA: Moderate degenerative changes without significant canal narrowing at any level. Multilevel mild-to-moderate neural foraminal narrowing.    PARASPINAL: No extraspinal abnormality. Visualized lung fields are clear.      Impression    IMPRESSION:  HEAD CT:  1.  Up to 3 mm acute extra-axial, most likely subdural hematoma overlying right parietal lobe (series 4 image 22).  2.  No significant mass effect.    CERVICAL SPINE CT:  1.  No CT evidence for acute fracture or post traumatic subluxation.    Results were called to Dr. PRINCESS FAUSTIN at 8/5/2024 11:16 PM CDT.   XR Shoulder Left Port G/E 2 Views     Status: None    Narrative    EXAM: XR SHOULDER LEFT PORT G/E 2 VIEWS  LOCATION: [facility]  DATE: 8/5/2024    INDICATION: fall, shoulder deformity, rule out fracture or dislocation  COMPARISON: None.      Impression    IMPRESSION: Anterior left glenohumeral joint dislocation. Likely Hill-Sachs impaction fracture of the humeral head. Chronic healed lateral left clavicle fracture deformity. Mild arthritis at the AC joint. Diffuse bone demineralization. Atherosclerotic   vascular disease aortic knob.    NOTE: ABNORMAL REPORT    THE DICTATION ABOVE DESCRIBES AN ABNORMALITY FOR WHICH FOLLOW-UP IS NEEDED.    Shoulder XR, left  2-3 vw PORTABLE     Status: None    Narrative    EXAM: XR SHOULDER LEFT PORT G/E 2 VIEWS  LOCATION: [facility]  DATE: 8/5/2024    INDICATION: Postreduction  COMPARISON: 08/05/2024      Impression    IMPRESSION: Interval reduction of dislocated left humeral head. Remote fracture left clavicle. Mild Hill-Sachs deformity humeral head not excluded.   CT Head w/o Contrast     Status: None    Narrative    EXAM: CT HEAD W/O  CONTRAST  LOCATION: [facility]  DATE: 8/6/2024    INDICATION: subdural hematoma  COMPARISON: Head CT: 8/5/2024.  TECHNIQUE: Routine CT Head without IV contrast. Multiplanar reformats. Dose reduction techniques were used.    FINDINGS:  INTRACRANIAL CONTENTS: Similar subdural hematoma over the right parietal convexity, which measures approximately 3 mm in maximum radial thickness (series 5/image 55). No progressive mass effect. No new sites of acute intracranial hemorrhage. No CT   evidence of acute infarct. Moderate presumed chronic small vessel ischemic changes. Moderate generalized volume loss, most pronounced along the anterior temporal lobes. No hydrocephalus. Calcified intracranial atherosclerosis.    VISUALIZED ORBITS/SINUSES/MASTOIDS: No intraorbital abnormality. No paranasal sinus mucosal disease. No middle ear or mastoid effusion.    BONES/SOFT TISSUES: Right parietal scalp contusion without subjacent calvarial fracture. Advanced bilateral TMJ degenerative changes.      Impression    IMPRESSION:    1.  Similar subdural hematoma over the right parietal convexity, measuring approximately 3 mm in radial thickness. No progressive mass effect.  2.  No new sites of acute intracranial hemorrhage or other new acute findings.  3.  Chronic changes as detailed above.   Basic metabolic panel     Status: Abnormal   Result Value Ref Range    Sodium 140 135 - 145 mmol/L    Potassium 5.0 3.4 - 5.3 mmol/L    Chloride 102 98 - 107 mmol/L    Carbon Dioxide (CO2) 25 22 - 29 mmol/L    Anion Gap 13 7 - 15 mmol/L    Urea Nitrogen 26.7 (H) 8.0 - 23.0 mg/dL    Creatinine 0.94 0.51 - 0.95 mg/dL    GFR Estimate 57 (L) >60 mL/min/1.73m2    Calcium 9.4 8.8 - 10.4 mg/dL    Glucose 158 (H) 70 - 99 mg/dL   UA with Microscopic     Status: Abnormal   Result Value Ref Range    Color Urine Light Yellow Colorless, Straw, Light Yellow, Yellow    Appearance Urine Clear Clear    Glucose Urine Negative Negative mg/dL     Bilirubin Urine Negative Negative    Ketones Urine Trace (A) Negative mg/dL    Specific Gravity Urine 1.027 1.003 - 1.035    Blood Urine Negative Negative    pH Urine 5.5 5.0 - 7.0    Protein Albumin Urine 30 (A) Negative mg/dL    Urobilinogen Urine Normal Normal, 2.0 mg/dL    Nitrite Urine Negative Negative    Leukocyte Esterase Urine Small (A) Negative    Mucus Urine Present (A) None Seen /LPF    RBC Urine 4 (H) <=2 /HPF    WBC Urine 24 (H) <=5 /HPF    Squamous Epithelials Urine 1 <=1 /HPF   CBC with platelets and differential     Status: None   Result Value Ref Range    WBC Count 9.2 4.0 - 11.0 10e3/uL    RBC Count 4.17 3.80 - 5.20 10e6/uL    Hemoglobin 13.1 11.7 - 15.7 g/dL    Hematocrit 41.4 35.0 - 47.0 %    MCV 99 78 - 100 fL    MCH 31.4 26.5 - 33.0 pg    MCHC 31.6 31.5 - 36.5 g/dL    RDW 14.1 10.0 - 15.0 %    Platelet Count 187 150 - 450 10e3/uL    % Neutrophils 79 %    % Lymphocytes 16 %    % Monocytes 4 %    % Eosinophils 0 %    % Basophils 0 %    % Immature Granulocytes 0 %    NRBCs per 100 WBC 0 <1 /100    Absolute Neutrophils 7.2 1.6 - 8.3 10e3/uL    Absolute Lymphocytes 1.5 0.8 - 5.3 10e3/uL    Absolute Monocytes 0.4 0.0 - 1.3 10e3/uL    Absolute Eosinophils 0.0 0.0 - 0.7 10e3/uL    Absolute Basophils 0.0 0.0 - 0.2 10e3/uL    Absolute Immature Granulocytes 0.0 <=0.4 10e3/uL    Absolute NRBCs 0.0 10e3/uL   EKG 12-lead, tracing only     Status: None   Result Value Ref Range    Systolic Blood Pressure  mmHg    Diastolic Blood Pressure  mmHg    Ventricular Rate 77 BPM    Atrial Rate 77 BPM    MA Interval 164 ms    QRS Duration 72 ms     ms    QTc 484 ms    P Axis 62 degrees    R AXIS 85 degrees    T Axis 85 degrees    Interpretation ECG       Sinus rhythm  Normal ECG  When compared with ECG of 11-Jan-2023 13:21,  QRS axis Shifted right  Minimal criteria for Inferior infarct are no longer Present  Confirmed by GENERATED REPORT, COMPUTER ([id number]),  Gus Ernandez ([id number]) on 8/5/2024 10:35:46 PM      CBC with platelets + differential     Status: None    Narrative    The following orders were created for panel order CBC with platelets + differential.  Procedure                               Abnormality         Status                     ---------                               -----------         ------                     CBC with platelets and d...[[id number]]                      Final result                 Please view results for these tests on the individual orders.          Attestation:  I have reviewed today's vital signs, notes, medications, labs and imaging with Dr. Nirmal Beltran.  Amount of time performed on this consult: 40 minutes.    Jill Lopez PA-C  [facility]

## 2024-08-07 VITALS
TEMPERATURE: 97.1 F | DIASTOLIC BLOOD PRESSURE: 88 MMHG | BODY MASS INDEX: 24.52 KG/M2 | RESPIRATION RATE: 12 BRPM | HEART RATE: 70 BPM | SYSTOLIC BLOOD PRESSURE: 134 MMHG | OXYGEN SATURATION: 90 % | WEIGHT: 142.86 LBS

## 2024-08-07 PROCEDURE — 99239 HOSP IP/OBS DSCHRG MGMT >30: CPT | Performed by: INTERNAL MEDICINE

## 2024-08-07 PROCEDURE — 250N000013 HC RX MED GY IP 250 OP 250 PS 637: Performed by: INTERNAL MEDICINE

## 2024-08-07 RX ADMIN — QUETIAPINE FUMARATE 6.25 MG: 25 TABLET ORAL at 09:49

## 2024-08-07 RX ADMIN — LEVOTHYROXINE SODIUM 50 MCG: 50 TABLET ORAL at 09:44

## 2024-08-07 RX ADMIN — DICLOFENAC 2 G: 10 GEL TOPICAL at 09:49

## 2024-08-07 RX ADMIN — Medication 75 MCG: at 09:44

## 2024-08-07 RX ADMIN — ACETAMINOPHEN 650 MG: 325 TABLET, FILM COATED ORAL at 09:44

## 2024-08-07 RX ADMIN — SERTRALINE HYDROCHLORIDE 75 MG: 50 TABLET ORAL at 09:44

## 2024-08-07 ASSESSMENT — ACTIVITIES OF DAILY LIVING (ADL)
ADLS_ACUITY_SCORE: 50
ADLS_ACUITY_SCORE: 57
ADLS_ACUITY_SCORE: 53
ADLS_ACUITY_SCORE: 50
ADLS_ACUITY_SCORE: 50
ADLS_ACUITY_SCORE: 57
ADLS_ACUITY_SCORE: 50
ADLS_ACUITY_SCORE: 54
DEPENDENT_IADLS:: CLEANING;COOKING;LAUNDRY;SHOPPING;MEAL PREPARATION;MEDICATION MANAGEMENT;MONEY MANAGEMENT;TRANSPORTATION;INCONTINENCE
ADLS_ACUITY_SCORE: 50
ADLS_ACUITY_SCORE: 56

## 2024-08-07 NOTE — PLAN OF CARE
Reason for Admission: SDH s/p fall, L shoulder dislocation    Cognitive/Mentation: unable to assess orientation  Neuros/CMS: intermittently follows commands, incomprehensible speech/word salad, expressive aphasia gen weakness  VS: stable on RA. Keep SBP <150.     /GI: Continent. Last BM 8/6/24.   Pulmonary: LS clear.  Pain: denies, rFLACC-0.     Drains/Lines: G20 R hand PIV-SL  Skin: scattered bruises and scabs  Activity: A x 1 hand held assist, NWB LUE(with arm sling), unable to use walker..    Diet: Easy to chew bite size diet and thin liquids. Took pills crushed in apple sauce this shift.     Therapies recs: back to memory care LTC  Discharge: pending    Aggression Stoplight Tool: green    End of shift summary: pleasantly confused. slept comfortably during the shift.

## 2024-08-07 NOTE — DISCHARGE SUMMARY
[facility]  Hospitalist Discharge Summary      Date of Admission:  8/5/2024  Date of Discharge:  8/7/2024  Discharging Provider: Monica Manzanares MD  Discharge Service: Hospitalist Service    Discharge Diagnoses   Unwitnessed fall   Subdural hematoma  Left shoulder dislocation-status post closed reduction of left shoulder dislocation  Old healed left distal clavicle shaft fracture  Pyuria  Hypertension  Vascular dementia  Depression  Hypothyroidism      Clinically Significant Risk Factors     # DMII: A1C = 6.5 % (Ref range: <5.7 %) within past 6 months       Follow-ups Needed After Discharge   Follow-up Appointments     Follow-up and recommended labs and tests       Your Neurosurgical follow-up appointments have been recommended 1 month   with head CT prior. You may call [phone] to make, confirm or change   your appointment dates and/or times.        Follow-up and recommended labs and tests       Follow up with primary care provider, CHARMAINE GOODMAN, within 7 days for   hospital follow- up.    Follow-up with neurosurgery head CT as advised   Follow up prn with Dr. Nirmal Beltran at [facility] for   the left shoulder as needed. Please call [phone] to schedule.            Unresulted Labs Ordered in the Past 30 Days of this Admission       Date and Time Order Name Status Description    8/5/2024 11:42 PM Urine Culture Preliminary           Discharge Disposition   Discharged to long-term care facility  Condition at discharge: Stable    Hospital Course   Danni Alvarez is a 89 year old female admitted from [facility] on 8/5/2024 after an unwitnessed fall, noted to have subdural hematoma     Subdural hematoma  Hx recurrent falls. Pt had unwitnessed fall at her [facility]. Sustained head and L shoulder injuries. Unable to provide history.   *CT head with 3 mm subdural hematoma w/o mass effect. C-spine w/o fracture.   -Repeat head CT was stable  -Neurosurgery consulted and  recommended nonoperative management and outpatient follow-up with neurosurgery in 1 month with a head CT prior     L shoulder dislocation  S/p Colsed redduction of L shoulder dislocation.   Possible Mild L humeral head impaction fx  Old healed L distal clavical shaft fracture  XR w/ L glenohumeral dislocation, likely fx humeral head. Reduced in ED  - orthopedic surgery followed while in-house, recommended nonoperative management  -Sling or shoulder immobilizer x 1 week. Okay to have off in bed if tolerated, but should be worn when the patient is ambulatory and sitting in a chair for added support. After 1 week, she may wean from the sling's use as tolerated.  -No weight bearing or use of a walker to the LUE x 1 week, then can advance activities as tolerated thereafter.   -Avoid behind the back and external rotation >30 degrees of the left shoulder until symptoms improve. Otherwise, okay for Codman's and gentle ROMAT after 1 week of sling use.     - Follow up prn with Dr. Nirmal Beltran at [facility] for the left shoulder as needed. Please call [phone] to schedule.      Pyuria  UA w/ 24 WBC, 4 RBC.   Urine culture with no growth, patient without any fever or urinary symptoms so monitored off  antibiotics     Hypertension   - not on meds at this time.    -Blood pressure mostly reasonable while in the hospital     Vascular dementia  MDD  Lives in memory care.   -Continue PTA meds    Hypothyroidism   TSH 1.42 5/9/24  -Continue levothyroxine         Consultations This Hospital Stay   NEUROSURGERY IP CONSULT  PHYSICAL THERAPY ADULT IP CONSULT  ORTHOPEDIC SURGERY IP CONSULT  OCCUPATIONAL THERAPY ADULT IP CONSULT  CARE MANAGEMENT / SOCIAL WORK IP CONSULT    Code Status   No CPR- Do NOT Intubate    Time Spent on this Encounter   I, Monica Manzanares MD, personally saw the patient today and spent greater than 30 minutes discharging this patient.       Monica Manzanares MD  [facility]  [address]  [address] [place] [address]  Phone: [phone]  ______________________________________________________________________    Physical Exam   Vital Signs: Temp: 97.1  F (36.2  C) Temp src: Axillary BP: 134/88 Pulse: 70   Resp: 12 SpO2: 90 % O2 Device: None (Room air)    Weight: 142 lbs 13.73 oz  Exam:  Constitutional: Awake, alert and no distress.  Pleasantly confused  Head: Normocephalic. No masses, lesions, tenderness or abnormalities  ENMT: ENT exam normal, no neck nodes or sinus tenderness  Cardiovascular: RRR.  No murmurs, no rubs or JVD  Respiratory: Normal WOB,b/l equal air entry, no wheezes or crackles   Gastrointestinal: Abdomen soft, non-tender. BS normal. No masses, organomegaly  : Deferred  Extremities : Sling in place, no edema , no clubbing or cyanosis           Primary Care Physician   CHARMAINE GOODMAN    Discharge Orders      CT Head w/o contrast*     Home Care Referral      Follow-up and recommended labs and tests     Your Neurosurgical follow-up appointments have been recommended 1 month with head CT prior. You may call [phone] to make, confirm or change your appointment dates and/or times.     Activity    Your activity upon discharge: Limit heavy lifting until follow up visit. Ok to walk as tolerated. No high impact activities until follow-up visit.     Reason for your hospital stay    Unwitnessed fall, subsequent ICH, shoulder dislocation     Follow-up and recommended labs and tests     Follow up with primary care provider, CHARMAINE GOODMAN, within 7 days for hospital follow- up.    Follow-up with neurosurgery head CT as advised   Follow up prn with Dr. Nirmal Beltran at [facility] for the left shoulder as needed. Please call [phone] to schedule.     Activity    Sling or shoulder immobilizer x 1 week. Okay to have off in bed if tolerated, but should be worn when the patient is ambulatory and sitting in a chair for added support. After 1 week, she may wean  from the sling's use as tolerated.  -No weight bearing or use of a walker to the LUE x 1 week, then can advance activities as tolerated thereafter.   -Avoid behind the back and external rotation >30 degrees of the left shoulder until symptoms improve. Otherwise, okay for Codman's and gentle ROMAT after 1 week of sling use.     Diet    Follow this diet upon discharge: Orders Placed This Encounter      Mechanical/Dental Soft Diet       Significant Results and Procedures   Results for orders placed or performed during the hospital encounter of 08/05/24   Head CT w/o contrast    Narrative    EXAM: CT HEAD W/O CONTRAST, CT CERVICAL SPINE W/O CONTRAST  LOCATION: [facility]  DATE: 8/5/2024    INDICATION: Head and neck injury  COMPARISON: CT head 3/16/2023, CT cervical spine 1/11/2023  TECHNIQUE:   1) Routine CT Head without IV contrast. Multiplanar reformats. Dose reduction techniques were used.  2) Routine CT Cervical Spine without IV contrast. Multiplanar reformats. Dose reduction techniques were used.    FINDINGS:   HEAD CT:   INTRACRANIAL CONTENTS: Up to 3 mm acute extra-axial, most likely subdural hematoma overlying right parietal lobe (series 4 image 22). No CT evidence of acute infarct. Moderate presumed chronic small vessel ischemic changes. Moderate generalized volume   loss. No hydrocephalus. Atrophy is more prominent in anterior aspect of both temporal lobes. This is a nonspecific finding, similar finding could be observed in dementia of Alzheimer's type. This is similar to slightly more prominent compared with the   prior exam.     VISUALIZED ORBITS/SINUSES/MASTOIDS: No intraorbital abnormality. No paranasal sinus mucosal disease. No middle ear or mastoid effusion.    BONES/SOFT TISSUES: Mild soft tissue swelling/hematoma overlying left parietal bone.    CERVICAL SPINE CT:   VERTEBRA: Normal vertebral body heights. Straightening of cervical lordosis. Slight anterior subluxation C3 on  C4. Slight right cervical curve. No fracture or posttraumatic subluxation.     CANAL/FORAMINA: Moderate degenerative changes without significant canal narrowing at any level. Multilevel mild-to-moderate neural foraminal narrowing.    PARASPINAL: No extraspinal abnormality. Visualized lung fields are clear.      Impression    IMPRESSION:  HEAD CT:  1.  Up to 3 mm acute extra-axial, most likely subdural hematoma overlying right parietal lobe (series 4 image 22).  2.  No significant mass effect.    CERVICAL SPINE CT:  1.  No CT evidence for acute fracture or post traumatic subluxation.    Results were called to Dr. PRINCESS FAUSTIN at 8/5/2024 11:16 PM CDT.   CT Cervical Spine w/o Contrast    Narrative    EXAM: CT HEAD W/O CONTRAST, CT CERVICAL SPINE W/O CONTRAST  LOCATION: [facility]  DATE: 8/5/2024    INDICATION: Head and neck injury  COMPARISON: CT head 3/16/2023, CT cervical spine 1/11/2023  TECHNIQUE:   1) Routine CT Head without IV contrast. Multiplanar reformats. Dose reduction techniques were used.  2) Routine CT Cervical Spine without IV contrast. Multiplanar reformats. Dose reduction techniques were used.    FINDINGS:   HEAD CT:   INTRACRANIAL CONTENTS: Up to 3 mm acute extra-axial, most likely subdural hematoma overlying right parietal lobe (series 4 image 22). No CT evidence of acute infarct. Moderate presumed chronic small vessel ischemic changes. Moderate generalized volume   loss. No hydrocephalus. Atrophy is more prominent in anterior aspect of both temporal lobes. This is a nonspecific finding, similar finding could be observed in dementia of Alzheimer's type. This is similar to slightly more prominent compared with the   prior exam.     VISUALIZED ORBITS/SINUSES/MASTOIDS: No intraorbital abnormality. No paranasal sinus mucosal disease. No middle ear or mastoid effusion.    BONES/SOFT TISSUES: Mild soft tissue swelling/hematoma overlying left parietal bone.    CERVICAL  SPINE CT:   VERTEBRA: Normal vertebral body heights. Straightening of cervical lordosis. Slight anterior subluxation C3 on C4. Slight right cervical curve. No fracture or posttraumatic subluxation.     CANAL/FORAMINA: Moderate degenerative changes without significant canal narrowing at any level. Multilevel mild-to-moderate neural foraminal narrowing.    PARASPINAL: No extraspinal abnormality. Visualized lung fields are clear.      Impression    IMPRESSION:  HEAD CT:  1.  Up to 3 mm acute extra-axial, most likely subdural hematoma overlying right parietal lobe (series 4 image 22).  2.  No significant mass effect.    CERVICAL SPINE CT:  1.  No CT evidence for acute fracture or post traumatic subluxation.    Results were called to Dr. PRINCESS FAUSTIN at 8/5/2024 11:16 PM CDT.   XR Shoulder Left Port G/E 2 Views    Narrative    EXAM: XR SHOULDER LEFT PORT G/E 2 VIEWS  LOCATION: [facility]  DATE: 8/5/2024    INDICATION: fall, shoulder deformity, rule out fracture or dislocation  COMPARISON: None.      Impression    IMPRESSION: Anterior left glenohumeral joint dislocation. Likely Hill-Sachs impaction fracture of the humeral head. Chronic healed lateral left clavicle fracture deformity. Mild arthritis at the AC joint. Diffuse bone demineralization. Atherosclerotic   vascular disease aortic knob.    NOTE: ABNORMAL REPORT    THE DICTATION ABOVE DESCRIBES AN ABNORMALITY FOR WHICH FOLLOW-UP IS NEEDED.    Shoulder XR, left  2-3 vw PORTABLE    Narrative    EXAM: XR SHOULDER LEFT PORT G/E 2 VIEWS  LOCATION: [facility]  DATE: 8/5/2024    INDICATION: Postreduction  COMPARISON: 08/05/2024      Impression    IMPRESSION: Interval reduction of dislocated left humeral head. Remote fracture left clavicle. Mild Hill-Sachs deformity humeral head not excluded.   CT Head w/o Contrast    Narrative    EXAM: CT HEAD W/O CONTRAST  LOCATION: [facility]  DATE:  8/6/2024    INDICATION: subdural hematoma  COMPARISON: Head CT: 8/5/2024.  TECHNIQUE: Routine CT Head without IV contrast. Multiplanar reformats. Dose reduction techniques were used.    FINDINGS:  INTRACRANIAL CONTENTS: Similar subdural hematoma over the right parietal convexity, which measures approximately 3 mm in maximum radial thickness (series 5/image 55). No progressive mass effect. No new sites of acute intracranial hemorrhage. No CT   evidence of acute infarct. Moderate presumed chronic small vessel ischemic changes. Moderate generalized volume loss, most pronounced along the anterior temporal lobes. No hydrocephalus. Calcified intracranial atherosclerosis.    VISUALIZED ORBITS/SINUSES/MASTOIDS: No intraorbital abnormality. No paranasal sinus mucosal disease. No middle ear or mastoid effusion.    BONES/SOFT TISSUES: Right parietal scalp contusion without subjacent calvarial fracture. Advanced bilateral TMJ degenerative changes.      Impression    IMPRESSION:    1.  Similar subdural hematoma over the right parietal convexity, measuring approximately 3 mm in radial thickness. No progressive mass effect.  2.  No new sites of acute intracranial hemorrhage or other new acute findings.  3.  Chronic changes as detailed above.       Discharge Medications   Current Discharge Medication List        CONTINUE these medications which have NOT CHANGED    Details   acetaminophen (TYLENOL) 325 MG tablet Take 650 mg by mouth 2 times daily      carbamide peroxide (DEBROX) 6.5 % otic solution Place 5 drops into both ears See Admin Instructions Offered to patient MWF as needed      diclofenac (VOLTAREN) 1 % topical gel Apply 2 g topically 2 times daily To R shoulder      dimethicone-zinc oxide (NENA PROTECT) external cream Apply topically 2 times daily as needed for dry skin or other      donepezil (ARICEPT) 10 MG tablet Take 10 mg by mouth at bedtime      levothyroxine (SYNTHROID/LEVOTHROID) 50 MCG tablet Take 50 mcg by mouth  daily      loperamide (IMODIUM) 2 MG capsule Take 2 mg by mouth daily as needed for diarrhea      !! QUEtiapine (SEROQUEL) 25 MG tablet Take 6.25 mg by mouth 2 times daily      !! QUEtiapine (SEROQUEL) 25 MG tablet Take 12.5 mg by mouth nightly as needed (agitation)      senna-docusate (SENOKOT-S/PERICOLACE) 8.6-50 MG tablet Take 2 tablets by mouth Every Mon, Wed, Fri Morning      sertraline (ZOLOFT) 25 MG tablet Take 75 mg by mouth daily      Vitamin D3 (CHOLECALCIFEROL) 25 mcg (1000 units) tablet Take 75 mcg by mouth daily       !! - Potential duplicate medications found. Please discuss with provider.        Allergies   Allergies   Allergen Reactions    Morphine

## 2024-08-07 NOTE — DISCHARGE INSTRUCTIONS
HOMECARE NOTE:   Your doctor has ordered home care to help you after your hospital stay.  The staff will contact you to schedule your first visit.  This service will be provided by [facility].  If you have any question, or have not received a call within 48 hours of discharge, please call them at [phone].    *please see homecare quality ratings for all homecares in your area at www.medicare.gov

## 2024-08-07 NOTE — PLAN OF CARE
Goal Outcome Evaluation:       Pt admitted after unwitnessed fall in memory care facility. Found to have small SDH and L shoulder dislocation. Repeat CT scan with no change in SDH. Alert, responds to name - unable to assess orientation d/t garbled speech (baseline per family). VSS on RA. CABELLO equally. Grossly neurologically intact. Does not follow most directions (baseline per family). No non-verbal indications of pain. L arm in sling with activity. Ambulating with asst of 1-2 and GB. Buttocks red, blanchable. Reviewed written discharge instructions with pt's adult daughter. Pt discharged to memory care facility with family providing transportation.

## 2024-08-07 NOTE — CONSULTS
Care Management Initial Consult    General Information  Assessment completed with: VM-chart review, Children, Care Team Member, daughter Kelley, Nurse Ginette  Type of CM/SW Visit: Initial Assessment    Primary Care Provider verified and updated as needed: Yes   Readmission within the last 30 days: no previous admission in last 30 days      Reason for Consult: discharge planning  Advance Care Planning: Advance Care Planning Reviewed: present on chart          Communication Assessment  Patient's communication style: spoken language (English or Bilingual)    Hearing Difficulty or Deaf: yes   Wear Glasses or Blind: no    Cognitive  Cognitive/Neuro/Behavioral: .WDL except  Level of Consciousness: alert  Arousal Level: opens eyes spontaneously  Orientation: disoriented to, place, time, situation  Mood/Behavior: calm, cooperative  Best Language: 2 - Severe aphasia  Speech: garbled    Living Environment:   People in home: facility resident     Current living Arrangements: assisted living (Memory Care)  Name of Facility: [facility] [phone]   Able to return to prior arrangements: yes       Family/Social Support:  Care provided by: other (see comments) (facility staff)  Provides care for: no one, unable/limited ability to care for self  Marital Status:   Children, Facility resident(s)/Staff          Description of Support System: Supportive    Support Assessment: Adequate family and caregiver support    Current Resources:   Patient receiving home care services: Yes     Community Resources: None  Equipment currently used at home: walker, rolling  Supplies currently used at home: None    Employment/Financial:  Employment Status: retired        Financial Concerns: none           Does the patient's insurance plan have a 3 day qualifying hospital stay waiver?  No    Lifestyle & Psychosocial Needs:  Social Determinants of Health     Food Insecurity: Not on file   Depression: Not at risk (3/2/2019)     Received from [facility]    PHQ-2     PHQ-2 Score: 1   Housing Stability: Not on file   Tobacco Use: Low Risk  (2/3/2022)    Received from [facility]    Patient History     Smoking Tobacco Use: Never Assessed     Smokeless Tobacco Use: Unknown     Passive Exposure: Not on file   Financial Resource Strain: Not on file   Alcohol Use: Not on file   Transportation Needs: Not on file   Physical Activity: Not on file   Interpersonal Safety: Not on file   Stress: Not on file   Social Connections: Not on file   Health Literacy: Not on file       Functional Status:  Prior to admission patient needed assistance:   Dependent ADLs:: Ambulation-walker, Bathing, Dressing, Grooming, Incontinence, Transfers, Toileting  Dependent IADLs:: Cleaning, Cooking, Laundry, Shopping, Meal Preparation, Medication Management, Money Management, Transportation, Incontinence       Mental Health Status:  Mental Health Status: No Current Concerns       Chemical Dependency Status:  Chemical Dependency Status: No Current Concerns             Values/Beliefs:  Spiritual, Cultural Beliefs, Religion Practices, Values that affect care: no               Additional Information:  CM consult for discharge planning.  Per chat review, patient presented to ED from her Memory Care facility after a fall and found to have SDH and L shoulder dislocation. Patient resides in  at [facility].  Spoke to Ginette at the  and updated her on current mobility needs (NWB LUE, unable to use walker and needs hand hold assist with all mobility).  She stated they could manage her cares with these needs.  Let her know pt is ready for discharge today.  Orders to be faxed to [phone].  Any new RX to be sent to [facility] pharmacy.    Pt will also need HHPT and OT.  Had spoken to Rica with [facility] as patient is open to their [facility] services (SN).  Will need orders for PT and OT as well.   Orders to be faxed to [phone].    Spoke to patient's daughter Stephanie.  Discussed her mobility limitations and she stated understanding.  She would like to transport her home.    Care Management Discharge Note    Discharge Date: 08/07/2024       Discharge Disposition: Assisted Living, Home Care (Memory Care)    Discharge Services:  n/a    Discharge DME:  n/a    Discharge Transportation:  analy Brown    Private pay costs discussed: Not applicable    Does the patient's insurance plan have a 3 day qualifying hospital stay waiver?  No    PAS Confirmation Code:  n/a  Patient/family educated on Medicare website which has current facility and service quality ratings:  n/a    Education Provided on the Discharge Plan: Yes  Persons Notified of Discharge Plans: RN, family, [facility]   Patient/Family in Agreement with the Plan: yes    Handoff Referral Completed: No    Additional Information:  Discharge orders faxed to [facility].Daughter Stephanie at hospital and will transport her mother home.    No further care management intervention anticipated at this time.  Please re-consult if further needs arise.  Care management signing off.      Ginette Amezquita RN, BSN, PHN  Inpatient Care Coordination  [facility]  Phone: [phone]

## 2024-08-08 ENCOUNTER — PATIENT OUTREACH (OUTPATIENT)
Dept: CARE COORDINATION | Facility: CLINIC | Age: 89
End: 2024-08-08
Payer: COMMERCIAL

## 2024-08-08 LAB — BACTERIA UR CULT: NORMAL

## 2024-08-08 NOTE — PROGRESS NOTES
Connected Care Resource Center: [facility]cted [facility] [place]    Background: Transitional Care Management program identified per system criteria and reviewed by Connected Care Resource Center team for possible outreach.    Assessment: Upon chart review, CCR Team member will not proceed with patient outreach related to this episode of Transitional Care Management program due to reason below:    Patient has discharged to a Memory Care, Long-term Care, Assisted Living or Group Home where patient is receiving on-site support with their daily cares, including support with hospital follow up plan.    Plan: Transitional Care Management episode addressed appropriately per reason noted above.      PAM May  [facility] [place], [facility]    *Connected Care Resource Team does NOT follow patient ongoing. Referrals are identified based on internal discharge reports and the outreach is to ensure patient has an understanding of their discharge instructions.

## 2024-08-08 NOTE — PLAN OF CARE
Physical Therapy Discharge Summary    Reason for therapy discharge:    Discharged to [facility] facility    Progress towards therapy goal(s). See goals on Care Plan in [facility] electronic health record for goal details.  Goals partially met.  Barriers to achieving goals:   discharge from facility.    Therapy recommendation(s):    Continued therapy is recommended.  Rationale/Recommendations: Pt admitted from [facility], has had multiple falls in the last 6 months per chart review. pt unable to provide any hx information, garbled speech, not following commands well during session, currently pt requires A x 1 with all mobility, NWB to LUE therefore unable to use walker. Pt currently requires 24/7 A with all mobility and ADLs. If [facility] facility is able to provide this level of assist then recommend pt returns to [facility] facility. Would benefit pt to return to familiar environment. If facility unable to accept pt back then pt will require TCU stay  PT Brief overview of current status: A x 1 hand held assist, NWB LUE, unable to use walker. per chart uses walker at baseline    Recommendation above provided by last treating therapist.

## 2024-09-25 ENCOUNTER — LAB REQUISITION (OUTPATIENT)
Dept: LAB | Facility: CLINIC | Age: 89
End: 2024-09-25
Payer: COMMERCIAL

## 2024-09-25 DIAGNOSIS — E03.9 HYPOTHYROIDISM, UNSPECIFIED: ICD-10-CM

## 2024-09-26 LAB
T4 FREE SERPL-MCNC: 0.98 NG/DL (ref 0.9–1.7)
TSH SERPL DL<=0.005 MIU/L-ACNC: 1.6 UIU/ML (ref 0.3–4.2)

## 2024-09-26 PROCEDURE — 84439 ASSAY OF FREE THYROXINE: CPT | Mod: ORL | Performed by: FAMILY MEDICINE

## 2024-09-26 PROCEDURE — 84443 ASSAY THYROID STIM HORMONE: CPT | Mod: ORL | Performed by: FAMILY MEDICINE

## 2024-09-26 PROCEDURE — P9604 ONE-WAY ALLOW PRORATED TRIP: HCPCS | Mod: ORL | Performed by: FAMILY MEDICINE

## 2024-09-26 PROCEDURE — 36415 COLL VENOUS BLD VENIPUNCTURE: CPT | Mod: ORL | Performed by: FAMILY MEDICINE

## 2024-10-29 ENCOUNTER — APPOINTMENT (OUTPATIENT)
Dept: CT IMAGING | Facility: CLINIC | Age: 89
End: 2024-10-29
Attending: EMERGENCY MEDICINE
Payer: COMMERCIAL

## 2024-10-29 ENCOUNTER — HOSPITAL ENCOUNTER (EMERGENCY)
Facility: CLINIC | Age: 89
Discharge: NURSING, REHAB OR LONG-TERM CARE | End: 2024-10-29
Attending: EMERGENCY MEDICINE | Admitting: EMERGENCY MEDICINE
Payer: COMMERCIAL

## 2024-10-29 VITALS
TEMPERATURE: 97.6 F | RESPIRATION RATE: 20 BRPM | SYSTOLIC BLOOD PRESSURE: 118 MMHG | OXYGEN SATURATION: 95 % | HEART RATE: 73 BPM | DIASTOLIC BLOOD PRESSURE: 84 MMHG
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
## 2024-10-29 DIAGNOSIS — S01.01XA SCALP LACERATION, INITIAL ENCOUNTER: ICD-10-CM

## 2024-10-29 PROCEDURE — 99284 EMERGENCY DEPT VISIT MOD MDM: CPT | Mod: 25

## 2024-10-29 PROCEDURE — 72125 CT NECK SPINE W/O DYE: CPT

## 2024-10-29 PROCEDURE — 70450 CT HEAD/BRAIN W/O DYE: CPT

## 2024-10-29 PROCEDURE — 250N000013 HC RX MED GY IP 250 OP 250 PS 637: Performed by: EMERGENCY MEDICINE

## 2024-10-29 PROCEDURE — 72128 CT CHEST SPINE W/O DYE: CPT

## 2024-10-29 RX ORDER — ACETAMINOPHEN 500 MG
1000 TABLET ORAL ONCE
Status: COMPLETED | OUTPATIENT
Start: 2024-10-29 | End: 2024-10-29

## 2024-10-29 RX ADMIN — ACETAMINOPHEN 1000 MG: 500 TABLET ORAL at 15:35

## 2024-10-29 ASSESSMENT — ACTIVITIES OF DAILY LIVING (ADL)
ADLS_ACUITY_SCORE: 0

## 2024-10-29 NOTE — ED NOTES
Bed: ED05  Expected date:   Expected time:   Means of arrival:   Comments:  [facility] 422 - 40F memory care fall

## 2024-10-29 NOTE — ED NOTES
RN notified [facility] staff of pt's condition, laceration monitoring and follow-up care, and transfer back to facility.

## 2024-10-29 NOTE — ED TRIAGE NOTES
Pt BIB EMS from [facility], after sustaining an unwitnessed fall.     Staff noted blood on pt's pillow and found a laceration on the back of pt's head. Additionally, staff noticed an old hematoma to pt's right knee.     Pt incoherently rambles and is confused at baseline. Pt normally ambulates without any assistive devices. Pt is not on any blood thinners and is DNR/DNI    Allergy to contrast dye.     .

## 2024-10-29 NOTE — ED PROVIDER NOTES
Emergency Department Note      History of Present Illness     Chief Complaint   Fall and Head Laceration      HPI   HPI   History limited due to dementia    Danni Alvarez is a 89 year old female with a past medical history significant for subdural hematoma, dementia who presents to the ED via/accompanied by EMS with a chief complaint of unwitnessed fall and head injury.  Per EMS report, the patient was found in bed with blood on her pillow and noted injury to the patient's occipital scalp.  She is reportedly not anticoagulated.  Patient denies any complaints other than motioning to her head when asked if she has pain.  She denies chest pain, extremity pain, abdominal pain.    Independent Historian   EMS as detailed above.    Review of External Notes   Hospitalization on 8/5/2024 for subdural hematoma    Past Medical History     Medical History and Problem List   Past Medical History:   Diagnosis Date    Abnormal feces     Abnormal glucose     Anxiety state, unspecified     Hyperlipidemia     Major depressive disorder, single episode, mild (H)     Malignant neoplasm of colon, unspecified site     Mild cognitive impairment, so stated     Unspecified hypothyroidism        Medications   acetaminophen (TYLENOL) 325 MG tablet  carbamide peroxide (DEBROX) 6.5 % otic solution  diclofenac (VOLTAREN) 1 % topical gel  dimethicone-zinc oxide (NENA PROTECT) external cream  donepezil (ARICEPT) 10 MG tablet  levothyroxine (SYNTHROID/LEVOTHROID) 50 MCG tablet  loperamide (IMODIUM) 2 MG capsule  QUEtiapine (SEROQUEL) 25 MG tablet  QUEtiapine (SEROQUEL) 25 MG tablet  senna-docusate (SENOKOT-S/PERICOLACE) 8.6-50 MG tablet  sertraline (ZOLOFT) 25 MG tablet  Vitamin D3 (CHOLECALCIFEROL) 25 mcg (1000 units) tablet        Surgical History   Past Surgical History:   Procedure Laterality Date    CATARACT IOL, RT/LT      right and left    COLECTOMY      HERNIA REPAIR      REPAIR PTOSIS BILATERAL Bilateral 1/21/2016    Procedure: REPAIR  PTOSIS BILATERAL;  Surgeon: Jose Stevens MD;  Location: [facility]    TUBAL LIGATION         Physical Exam     Patient Vitals for the past 24 hrs:   BP Temp Temp src Pulse Resp SpO2   10/29/24 1645 120/63 -- -- 56 -- --   10/29/24 1620 -- -- -- -- -- 96 %   10/29/24 1618 110/64 97.6  F (36.4  C) Axillary 62 20 96 %   10/29/24 1501 121/61 97.3  F (36.3  C) Temporal 73 14 93 %     Physical Exam  Constitutional: Well developed, nontox appearance  Head: 1 cm minimally gaping laceration to occipital scalp, hemostatic  Mouth/Throat: Oropharynx is clear and moist.   Neck:  no stridor, no C-spine tenderness  Eyes: no scleral icterus  Cardiovascular: RRR, 2+ bilat radial pulses  Pulmonary/Chest: nml resp effort, Clear BS bilat  Abdominal: ND, soft, NT, no rebound or guarding   Back: T-spine tenderness, no L-spine tenderness  Ext: Warm, well perfused, no edema, no obvious deformities or tenderness, full passive ROM throughout  Neurological: Alert and disoriented, symmetric facies, moves ext x4  Skin: Skin is warm and dry.   Psychiatric: Pleasantly confused, baseline dementia  Nursing note and vitals reviewed.    Diagnostics     Lab Results   Labs Ordered and Resulted from Time of ED Arrival to Time of ED Departure - No data to display    Imaging   CT Thoracic Spine w/o Contrast   Final Result   IMPRESSION:   1.  No acute/subacute rather spine fractures or posttraumatic subluxations.         CT Cervical Spine w/o Contrast   Final Result   IMPRESSION:   1.  No fracture or posttraumatic subluxation.         CT Head w/o Contrast   Final Result   IMPRESSION:   1.  Compared to previous head CT 08/06/2024, interval resolution of the previously noted small subdural hematoma along the right parietal convexity.    2.  No new intracranial hemorrhage, mass lesions, hydrocephalus or CT evidence for an acute infarct.   3.  Mild diffuse cerebral parenchymal volume loss. Presumed chronic hypertensive/microvascular ischemic white matter  changes.          EKG   ECG results from 08/05/24   EKG 12-lead, tracing only     Value    Systolic Blood Pressure     Diastolic Blood Pressure     Ventricular Rate 77    Atrial Rate 77    ID Interval 164    QRS Duration 72        QTc 484    P Axis 62    R AXIS 85    T Axis 85    Interpretation ECG      Sinus rhythm  Normal ECG  When compared with ECG of 11-Jan-2023 13:21,  QRS axis Shifted right  Minimal criteria for Inferior infarct are no longer Present  Confirmed by GENERATED REPORT, COMPUTER ([id number]),  Gus Ernandez ([id number]) on 8/5/2024 10:35:46 PM         Independent Interpretation   CT head negative for intracranial hemorrhage    ED Course      Medications Administered   Medications   acetaminophen (TYLENOL) tablet 1,000 mg (1,000 mg Oral $Given 10/29/24 0370)       Procedures   Procedures       Narrative: Procedure: Laceration Repair        LACERATION:  A simple and superficial clean 1.5 cm laceration.      LOCATION: Occipital scalp      ANESTHESIA: None      PREPARATION:  Irrigation and Scrubbing with Techni-Care and Normal Saline      DEBRIDEMENT:  no debridement and wound explored, no foreign body found      CLOSURE:  Wound was closed with Dermabond      Discussion of Management   None    ED Course        Additional Documentation  Social determinants include age increasing risk for presentation to the emergency department    Medical Decision Making / Diagnosis     CMS Diagnoses: None    MIPS       None    MDM     89 year old female presenting w/ head injury s/p mechanical fall    DDx includes mechanical fall, fracture, contusion, intracranial hemorrhage, skull fracture.  Doubt seizure, syncope, CVA given history and physical exam.  No other evidence of trauma on full primary and secondary trauma surveys other than areas imaged above or documented in physical exam.  Given likely mechanical fall and no other complaints, stable vital signs, EKG and blood work deferred. Imaging sig for no acute  traumatic abnormality.  Interventions as noted above.  Laceration repaired as noted above.  At this time I feel the pt is safe for discharge.  Recommendations given regarding follow up with PCP and return to the emergency department as needed for new or worsening symptoms.  Pt's daughter, Arely, counseled on all results, disposition and diagnosis.  They are understanding and agreeable to plan. Patient discharged in stable condition.        Disposition   The patient was discharged.     Diagnosis     ICD-10-CM    1. Fall, initial encounter  W19.XXXA       2. Scalp laceration, initial encounter  S01.01XA            Discharge Medications   New Prescriptions    No medications on file         MD Fabien Morrison Christopher E, MD  10/29/24 3349

## 2024-10-30 NOTE — ED NOTES
Attempted to assist patient to use the commode. Once seated, pt continued to try to stand and said 'bed'. Pt did not void.

## 2024-12-05 ENCOUNTER — APPOINTMENT (OUTPATIENT)
Dept: CT IMAGING | Facility: CLINIC | Age: 89
End: 2024-12-05
Attending: EMERGENCY MEDICINE
Payer: COMMERCIAL

## 2024-12-05 ENCOUNTER — HOSPITAL ENCOUNTER (EMERGENCY)
Facility: CLINIC | Age: 89
Discharge: HOME | End: 2024-12-05
Attending: EMERGENCY MEDICINE
Payer: COMMERCIAL

## 2024-12-05 VITALS
HEART RATE: 56 BPM | HEIGHT: 64 IN | DIASTOLIC BLOOD PRESSURE: 90 MMHG | OXYGEN SATURATION: 95 % | TEMPERATURE: 97 F | WEIGHT: 142 LBS | SYSTOLIC BLOOD PRESSURE: 154 MMHG | RESPIRATION RATE: 23 BRPM | BODY MASS INDEX: 24.24 KG/M2

## 2024-12-05 DIAGNOSIS — Z86.59 HISTORY OF DEMENTIA: ICD-10-CM

## 2024-12-05 DIAGNOSIS — Z86.79 HISTORY OF INTRACRANIAL HEMORRHAGE: ICD-10-CM

## 2024-12-05 DIAGNOSIS — R29.6 UNWITNESSED FALL: ICD-10-CM

## 2024-12-05 LAB
ANION GAP SERPL CALCULATED.3IONS-SCNC: 14 MMOL/L (ref 7–15)
ATRIAL RATE - MUSE: 61 BPM
BASOPHILS # BLD AUTO: 0 10E3/UL (ref 0–0.2)
BASOPHILS NFR BLD AUTO: 0 %
BUN SERPL-MCNC: 23.8 MG/DL (ref 8–23)
CALCIUM SERPL-MCNC: 9.5 MG/DL (ref 8.8–10.4)
CHLORIDE SERPL-SCNC: 102 MMOL/L (ref 98–107)
CREAT SERPL-MCNC: 0.69 MG/DL (ref 0.51–0.95)
DIASTOLIC BLOOD PRESSURE - MUSE: NORMAL MMHG
EGFRCR SERPLBLD CKD-EPI 2021: 81 ML/MIN/1.73M2
EOSINOPHIL # BLD AUTO: 0 10E3/UL (ref 0–0.7)
EOSINOPHIL NFR BLD AUTO: 0 %
ERYTHROCYTE [DISTWIDTH] IN BLOOD BY AUTOMATED COUNT: 13.8 % (ref 10–15)
GLUCOSE SERPL-MCNC: 132 MG/DL (ref 70–99)
HCO3 SERPL-SCNC: 25 MMOL/L (ref 22–29)
HCT VFR BLD AUTO: 45.2 % (ref 35–47)
HGB BLD-MCNC: 14.5 G/DL (ref 11.7–15.7)
IMM GRANULOCYTES # BLD: 0.1 10E3/UL
IMM GRANULOCYTES NFR BLD: 1 %
INTERPRETATION ECG - MUSE: NORMAL
LYMPHOCYTES # BLD AUTO: 1.1 10E3/UL (ref 0.8–5.3)
LYMPHOCYTES NFR BLD AUTO: 11 %
MCH RBC QN AUTO: 30.7 PG (ref 26.5–33)
MCHC RBC AUTO-ENTMCNC: 32.1 G/DL (ref 31.5–36.5)
MCV RBC AUTO: 96 FL (ref 78–100)
MONOCYTES # BLD AUTO: 0.5 10E3/UL (ref 0–1.3)
MONOCYTES NFR BLD AUTO: 5 %
NEUTROPHILS # BLD AUTO: 8.2 10E3/UL (ref 1.6–8.3)
NEUTROPHILS NFR BLD AUTO: 83 %
NRBC # BLD AUTO: 0 10E3/UL
NRBC BLD AUTO-RTO: 0 /100
P AXIS - MUSE: -19 DEGREES
PLATELET # BLD AUTO: 192 10E3/UL (ref 150–450)
POTASSIUM SERPL-SCNC: 4.5 MMOL/L (ref 3.4–5.3)
PR INTERVAL - MUSE: 146 MS
QRS DURATION - MUSE: 66 MS
QT - MUSE: 458 MS
QTC - MUSE: 461 MS
R AXIS - MUSE: 46 DEGREES
RBC # BLD AUTO: 4.72 10E6/UL (ref 3.8–5.2)
SODIUM SERPL-SCNC: 141 MMOL/L (ref 135–145)
SYSTOLIC BLOOD PRESSURE - MUSE: NORMAL MMHG
T AXIS - MUSE: 99 DEGREES
VENTRICULAR RATE- MUSE: 61 BPM
WBC # BLD AUTO: 9.8 10E3/UL (ref 4–11)

## 2024-12-05 PROCEDURE — 84295 ASSAY OF SERUM SODIUM: CPT | Performed by: EMERGENCY MEDICINE

## 2024-12-05 PROCEDURE — 99285 EMERGENCY DEPT VISIT HI MDM: CPT | Mod: 25

## 2024-12-05 PROCEDURE — 85025 COMPLETE CBC W/AUTO DIFF WBC: CPT | Performed by: EMERGENCY MEDICINE

## 2024-12-05 PROCEDURE — 80048 BASIC METABOLIC PNL TOTAL CA: CPT | Performed by: EMERGENCY MEDICINE

## 2024-12-05 PROCEDURE — 93005 ELECTROCARDIOGRAM TRACING: CPT

## 2024-12-05 PROCEDURE — 36415 COLL VENOUS BLD VENIPUNCTURE: CPT | Performed by: EMERGENCY MEDICINE

## 2024-12-05 PROCEDURE — 82565 ASSAY OF CREATININE: CPT | Performed by: EMERGENCY MEDICINE

## 2024-12-05 PROCEDURE — 70450 CT HEAD/BRAIN W/O DYE: CPT

## 2024-12-05 ASSESSMENT — ACTIVITIES OF DAILY LIVING (ADL)
ADLS_ACUITY_SCORE: 60

## 2024-12-05 NOTE — ED PROVIDER NOTES
Emergency Department Note      History of Present Illness     Chief Complaint:  Fall    HPI   Danni Alvarez is a 89 year old female with a history of dementia who presents by EMS from her memory care after an unwitnessed fall.  Due to dementia, patient is a poor and unreliable historian, unable to provide any meaningful he reliable contribution to her history.    Independent Historian: EMS, who reports that care facility staff make hourly rounds, she was found down on the ground, no clear injuries noted.  This was unwitnessed, unclear how she ended up on the ground.  Vital signs and blood sugar satisfactory for EMS during transport.  No medications given during transport.  She is not on blood thinners.    I spoke with daughter by phone, daughter reports that she has had a handful of falls in the last 6 months or so.    Review of External Notes: I personally reviewed prior records including discharge summary from August at which time she had a nonoperative intracranial hemorrhage and a left shoulder dislocation that was reduced in the emergency department.    Past Medical History     Medical History and Problem List   Past Medical History:   Diagnosis Date    Abnormal feces     Abnormal glucose     Anxiety state, unspecified     Hyperlipidemia     Major depressive disorder, single episode, mild (H)     Malignant neoplasm of colon, unspecified site     Mild cognitive impairment, so stated     Unspecified hypothyroidism        Medications   acetaminophen (TYLENOL) 325 MG tablet  carbamide peroxide (DEBROX) 6.5 % otic solution  diclofenac (VOLTAREN) 1 % topical gel  dimethicone-zinc oxide (NENA PROTECT) external cream  donepezil (ARICEPT) 10 MG tablet  levothyroxine (SYNTHROID/LEVOTHROID) 50 MCG tablet  loperamide (IMODIUM) 2 MG capsule  QUEtiapine (SEROQUEL) 25 MG tablet  QUEtiapine (SEROQUEL) 25 MG tablet  senna-docusate (SENOKOT-S/PERICOLACE) 8.6-50 MG tablet  sertraline (ZOLOFT) 25 MG tablet  Vitamin D3  "(CHOLECALCIFEROL) 25 mcg (1000 units) tablet      Surgical History   Past Surgical History:   Procedure Laterality Date    CATARACT IOL, RT/LT      right and left    COLECTOMY      HERNIA REPAIR      REPAIR PTOSIS BILATERAL Bilateral 1/21/2016    Procedure: REPAIR PTOSIS BILATERAL;  Surgeon: Jose Stevens MD;  Location: [facility]    TUBAL LIGATION       Physical Exam     Patient Vitals for the past 24 hrs:   BP Temp Temp src Pulse Resp SpO2 Height Weight   12/05/24 0950 (!) 154/90 -- -- 56 23 95 % -- --   12/05/24 0820 (!) 163/61 -- -- 63 23 95 % -- --   12/05/24 0740 (!) 164/68 -- -- 58 17 94 % -- --   12/05/24 0738 (!) 164/68 97  F (36.1  C) Temporal 58 12 93 % 1.626 m (5' 4\") 64.4 kg (142 lb)     Physical Exam  General: Chronically ill but nontoxic-appearing woman recumbent in the gurney in room 7  HENT: face nontender with full painless ROM mandible, no bony deformity, OP clear, no difficulty controlling secretions, skull nontender  Eyes: PERRL without proptosis  CV:  regular rhythm, cap refill normal in all extremities  Resp: normal effort, speaks in full phrases, no stridor  GI: abdomen soft, nontender, no guarding  MSK:  Cervical spine:  no midline tenderness, FROM  Thoracic spine: no midline tenderness, no CVAT  Lumbar spine: no midline tenderness  Chest wall: nontender without crepitus  Pelvis stable  Extremities: no focal tenderness  Skin:   No abrasion  No ecchymosis  No laceration  Neuro: awake, alert, smiles and laughs, moves all extremities with symmetric tone but does not consistently follow commands, no nuchal rigidity  Psych: cooperative with basic cares    Diagnostics   Electrocardiogram  ECG taken at 0745, ECG interpreted at 0746 by REYNALDO Bateman MD  Likely sinus rhythm, slight artifact present  Rate 61 bpm. VT interval 146. QRS duration 66. QTc 461    Lab Results   Labs Ordered and Resulted from Time of ED Arrival to Time of ED Departure   BASIC METABOLIC PANEL - Abnormal       Result Value    " Sodium 141      Potassium 4.5      Chloride 102      Carbon Dioxide (CO2) 25      Anion Gap 14      Urea Nitrogen 23.8 (*)     Creatinine 0.69      GFR Estimate 81      Calcium 9.5      Glucose 132 (*)    CBC WITH PLATELETS AND DIFFERENTIAL    WBC Count 9.8      RBC Count 4.72      Hemoglobin 14.5      Hematocrit 45.2      MCV 96      MCH 30.7      MCHC 32.1      RDW 13.8      Platelet Count 192      % Neutrophils 83      % Lymphocytes 11      % Monocytes 5      % Eosinophils 0      % Basophils 0      % Immature Granulocytes 1      NRBCs per 100 WBC 0      Absolute Neutrophils 8.2      Absolute Lymphocytes 1.1      Absolute Monocytes 0.5      Absolute Eosinophils 0.0      Absolute Basophils 0.0      Absolute Immature Granulocytes 0.1      Absolute NRBCs 0.0       Imaging   CT Head w/o Contrast   Final Result   IMPRESSION: Negative for acute intracranial hemorrhage, hydrocephalus   or transcortical infarct. No skull fracture.      CATA ARITA DO            SYSTEM ID:  [id number]        Independent Interpretation:   I personally reviewed CT head images, I see no large ICH.    ED Course      Medications Administered   Medications - No data to display    ED Course and Discussion of Management   ED Course as of 12/05/24 1344   Thu Dec 05, 2024   0744 I called daughter Arely Padron.   0747 I rechecked patient, spoke with RN.   0911 I rechecked patient, spoke with nurse to coordinate plan.   1004 Patient passed road test with walker, plan to discharge back to memory care.       Additional Documentation  None    Medical Decision Making / Diagnosis   Medical Decision Making:  She presumably experienced an unwitnessed fall, unclear whether she had syncope, seizure, or some other precipitant.  Due to her advanced dementia, she is unable to provide any reliable contribution to her history, fortunately she does not have any symptoms at this time.  I considered intracranial hemorrhage and head CT is fortunately negative.  No  acute neurologic deficits are noted.  A variety of other potentially serious traumatic injuries were also considered but do not seem to be present based on her detailed head to toe examination such that I felt she can be reasonably discharged back to her care facility.  No neck or back pain or tenderness.  No preceding medical symptoms such as fevers or vomiting.  Her care facility as well as family were contacted by phone and all involved parties are comfortable with this plan given her reassuring evaluation at this point.  Return here for crisis at any hour and otherwise follow-up with her primary clinician within the next day or so.  While her clinical trajectory would suggest that she is still at high risk of recurrent falls and other issues including subsequent injuries, at this time I do not think that hospitalization is indicated.  Discharged back to her care facility.    Disposition   Discharge    Diagnosis     ICD-10-CM    1. Unwitnessed fall  R29.6       2. History of dementia  Z86.59       3. History of intracranial hemorrhage  Z86.79          12/5/2024   MD Bhavana Dunaway Jeffrey Alan, MD  12/05/24 1343

## 2024-12-05 NOTE — ED NOTES
Bed: ED07  Expected date:   Expected time:   Means of arrival:   Comments:  [facility] [place] [facility] 80f fall eta 10

## 2024-12-05 NOTE — ED TRIAGE NOTES
[facility] Unwittnessed fall, unknown how long on ground. Not on thinners. History of right hip injury and SDH.

## 2024-12-05 NOTE — DISCHARGE INSTRUCTIONS
Fortunately, there are no signs of any immediately dangerous injury or other new medical process at this time.

## 2024-12-12 ENCOUNTER — LAB REQUISITION (OUTPATIENT)
Dept: LAB | Facility: CLINIC | Age: 89
End: 2024-12-12
Payer: COMMERCIAL

## 2024-12-13 ENCOUNTER — LAB REQUISITION (OUTPATIENT)
Dept: LAB | Facility: CLINIC | Age: 89
End: 2024-12-13
Payer: COMMERCIAL

## 2024-12-13 LAB
ALBUMIN UR-MCNC: 20 MG/DL
APPEARANCE UR: ABNORMAL
BACTERIA #/AREA URNS HPF: ABNORMAL /HPF
BILIRUB UR QL STRIP: NEGATIVE
COLOR UR AUTO: YELLOW
GLUCOSE UR STRIP-MCNC: NEGATIVE MG/DL
HGB UR QL STRIP: NEGATIVE
KETONES UR STRIP-MCNC: NEGATIVE MG/DL
LEUKOCYTE ESTERASE UR QL STRIP: ABNORMAL
MUCOUS THREADS #/AREA URNS LPF: PRESENT /LPF
NITRATE UR QL: POSITIVE
PH UR STRIP: 5.5 [PH] (ref 5–7)
RBC URINE: 0 /HPF
SP GR UR STRIP: 1.02 (ref 1–1.03)
SQUAMOUS EPITHELIAL: 4 /HPF
TRANSITIONAL EPI: <1 /HPF
UROBILINOGEN UR STRIP-MCNC: NORMAL MG/DL
WBC URINE: 24 /HPF

## 2024-12-13 PROCEDURE — 81001 URINALYSIS AUTO W/SCOPE: CPT | Mod: ORL | Performed by: PHYSICIAN ASSISTANT

## 2024-12-13 PROCEDURE — 87186 SC STD MICRODIL/AGAR DIL: CPT | Mod: ORL | Performed by: PHYSICIAN ASSISTANT

## 2024-12-14 LAB — BACTERIA UR CULT: ABNORMAL

## 2024-12-31 ENCOUNTER — LAB REQUISITION (OUTPATIENT)
Dept: LAB | Facility: CLINIC | Age: 89
End: 2024-12-31
Payer: COMMERCIAL

## 2024-12-31 DIAGNOSIS — D51.9 VITAMIN B12 DEFICIENCY ANEMIA, UNSPECIFIED: ICD-10-CM

## 2024-12-31 DIAGNOSIS — E03.9 HYPOTHYROIDISM, UNSPECIFIED: ICD-10-CM

## 2024-12-31 DIAGNOSIS — I10 ESSENTIAL (PRIMARY) HYPERTENSION: ICD-10-CM

## 2024-12-31 DIAGNOSIS — E55.9 VITAMIN D DEFICIENCY, UNSPECIFIED: ICD-10-CM

## 2025-01-02 LAB
ALBUMIN SERPL BCG-MCNC: 4.1 G/DL (ref 3.5–5.2)
ALP SERPL-CCNC: 101 U/L (ref 40–150)
ALT SERPL W P-5'-P-CCNC: 16 U/L (ref 0–50)
ANION GAP SERPL CALCULATED.3IONS-SCNC: 17 MMOL/L (ref 7–15)
AST SERPL W P-5'-P-CCNC: 34 U/L (ref 0–45)
BASOPHILS # BLD AUTO: 0.1 10E3/UL (ref 0–0.2)
BASOPHILS NFR BLD AUTO: 1 %
BILIRUB SERPL-MCNC: 0.4 MG/DL
BUN SERPL-MCNC: 32.3 MG/DL (ref 8–23)
CALCIUM SERPL-MCNC: 9.8 MG/DL (ref 8.8–10.4)
CHLORIDE SERPL-SCNC: 105 MMOL/L (ref 98–107)
CREAT SERPL-MCNC: 0.97 MG/DL (ref 0.51–0.95)
EGFRCR SERPLBLD CKD-EPI 2021: 55 ML/MIN/1.73M2
EOSINOPHIL # BLD AUTO: 0.2 10E3/UL (ref 0–0.7)
EOSINOPHIL NFR BLD AUTO: 2 %
ERYTHROCYTE [DISTWIDTH] IN BLOOD BY AUTOMATED COUNT: 14.6 % (ref 10–15)
GLUCOSE SERPL-MCNC: 138 MG/DL (ref 70–99)
HCO3 SERPL-SCNC: 22 MMOL/L (ref 22–29)
HCT VFR BLD AUTO: 43.7 % (ref 35–47)
HGB BLD-MCNC: 13.6 G/DL (ref 11.7–15.7)
IMM GRANULOCYTES # BLD: 0.1 10E3/UL
IMM GRANULOCYTES NFR BLD: 1 %
LYMPHOCYTES # BLD AUTO: 3 10E3/UL (ref 0.8–5.3)
LYMPHOCYTES NFR BLD AUTO: 34 %
MCH RBC QN AUTO: 30.9 PG (ref 26.5–33)
MCHC RBC AUTO-ENTMCNC: 31.1 G/DL (ref 31.5–36.5)
MCV RBC AUTO: 99 FL (ref 78–100)
MONOCYTES # BLD AUTO: 0.6 10E3/UL (ref 0–1.3)
MONOCYTES NFR BLD AUTO: 7 %
NEUTROPHILS # BLD AUTO: 4.9 10E3/UL (ref 1.6–8.3)
NEUTROPHILS NFR BLD AUTO: 56 %
NRBC # BLD AUTO: 0 10E3/UL
NRBC BLD AUTO-RTO: 0 /100
PLATELET # BLD AUTO: 265 10E3/UL (ref 150–450)
POTASSIUM SERPL-SCNC: 4.5 MMOL/L (ref 3.4–5.3)
PROT SERPL-MCNC: 7 G/DL (ref 6.4–8.3)
RBC # BLD AUTO: 4.4 10E6/UL (ref 3.8–5.2)
SODIUM SERPL-SCNC: 144 MMOL/L (ref 135–145)
TSH SERPL DL<=0.005 MIU/L-ACNC: 2.99 UIU/ML (ref 0.3–4.2)
VIT B12 SERPL-MCNC: 529 PG/ML (ref 232–1245)
VIT D+METAB SERPL-MCNC: 57 NG/ML (ref 20–50)
WBC # BLD AUTO: 8.8 10E3/UL (ref 4–11)

## 2025-01-02 PROCEDURE — 82306 VITAMIN D 25 HYDROXY: CPT | Mod: ORL | Performed by: PHYSICIAN ASSISTANT

## 2025-01-02 PROCEDURE — 36415 COLL VENOUS BLD VENIPUNCTURE: CPT | Mod: ORL | Performed by: PHYSICIAN ASSISTANT

## 2025-01-02 PROCEDURE — 85025 COMPLETE CBC W/AUTO DIFF WBC: CPT | Mod: ORL | Performed by: PHYSICIAN ASSISTANT

## 2025-01-02 PROCEDURE — 80053 COMPREHEN METABOLIC PANEL: CPT | Mod: ORL | Performed by: PHYSICIAN ASSISTANT

## 2025-01-02 PROCEDURE — P9604 ONE-WAY ALLOW PRORATED TRIP: HCPCS | Mod: ORL | Performed by: PHYSICIAN ASSISTANT

## 2025-01-02 PROCEDURE — 84443 ASSAY THYROID STIM HORMONE: CPT | Mod: ORL | Performed by: PHYSICIAN ASSISTANT

## 2025-01-02 PROCEDURE — 82607 VITAMIN B-12: CPT | Mod: ORL | Performed by: PHYSICIAN ASSISTANT

## 2025-01-16 ENCOUNTER — APPOINTMENT (OUTPATIENT)
Dept: CT IMAGING | Facility: CLINIC | Age: OVER 89
End: 2025-01-16
Attending: EMERGENCY MEDICINE
Payer: COMMERCIAL

## 2025-01-16 ENCOUNTER — APPOINTMENT (OUTPATIENT)
Dept: GENERAL RADIOLOGY | Facility: CLINIC | Age: OVER 89
End: 2025-01-16
Attending: EMERGENCY MEDICINE
Payer: COMMERCIAL

## 2025-01-16 ENCOUNTER — HOSPITAL ENCOUNTER (EMERGENCY)
Facility: CLINIC | Age: OVER 89
Discharge: HOME | End: 2025-01-16
Attending: EMERGENCY MEDICINE
Payer: COMMERCIAL

## 2025-01-16 VITALS
HEART RATE: 87 BPM | OXYGEN SATURATION: 93 % | TEMPERATURE: 96.7 F | RESPIRATION RATE: 16 BRPM | SYSTOLIC BLOOD PRESSURE: 123 MMHG | DIASTOLIC BLOOD PRESSURE: 78 MMHG
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
## 2025-01-16 DIAGNOSIS — S01.01XA SCALP LACERATION, INITIAL ENCOUNTER: ICD-10-CM

## 2025-01-16 LAB
ANION GAP SERPL CALCULATED.3IONS-SCNC: 11 MMOL/L (ref 7–15)
BASOPHILS # BLD AUTO: 0 10E3/UL (ref 0–0.2)
BASOPHILS NFR BLD AUTO: 0 %
BUN SERPL-MCNC: 30.8 MG/DL (ref 8–23)
CALCIUM SERPL-MCNC: 9.3 MG/DL (ref 8.8–10.4)
CHLORIDE SERPL-SCNC: 107 MMOL/L (ref 98–107)
CREAT SERPL-MCNC: 0.82 MG/DL (ref 0.51–0.95)
EGFRCR SERPLBLD CKD-EPI 2021: 67 ML/MIN/1.73M2
EOSINOPHIL # BLD AUTO: 0.1 10E3/UL (ref 0–0.7)
EOSINOPHIL NFR BLD AUTO: 2 %
ERYTHROCYTE [DISTWIDTH] IN BLOOD BY AUTOMATED COUNT: 14.3 % (ref 10–15)
GLUCOSE SERPL-MCNC: 118 MG/DL (ref 70–99)
HCO3 SERPL-SCNC: 26 MMOL/L (ref 22–29)
HCT VFR BLD AUTO: 42 % (ref 35–47)
HGB BLD-MCNC: 13.7 G/DL (ref 11.7–15.7)
IMM GRANULOCYTES # BLD: 0 10E3/UL
IMM GRANULOCYTES NFR BLD: 1 %
LYMPHOCYTES # BLD AUTO: 1.7 10E3/UL (ref 0.8–5.3)
LYMPHOCYTES NFR BLD AUTO: 24 %
MCH RBC QN AUTO: 31.8 PG (ref 26.5–33)
MCHC RBC AUTO-ENTMCNC: 32.6 G/DL (ref 31.5–36.5)
MCV RBC AUTO: 97 FL (ref 78–100)
MONOCYTES # BLD AUTO: 0.5 10E3/UL (ref 0–1.3)
MONOCYTES NFR BLD AUTO: 7 %
NEUTROPHILS # BLD AUTO: 4.7 10E3/UL (ref 1.6–8.3)
NEUTROPHILS NFR BLD AUTO: 66 %
NRBC # BLD AUTO: 0 10E3/UL
NRBC BLD AUTO-RTO: 0 /100
PLATELET # BLD AUTO: 190 10E3/UL (ref 150–450)
POTASSIUM SERPL-SCNC: 4.6 MMOL/L (ref 3.4–5.3)
RBC # BLD AUTO: 4.31 10E6/UL (ref 3.8–5.2)
SODIUM SERPL-SCNC: 144 MMOL/L (ref 135–145)
WBC # BLD AUTO: 7 10E3/UL (ref 4–11)

## 2025-01-16 PROCEDURE — 72170 X-RAY EXAM OF PELVIS: CPT

## 2025-01-16 PROCEDURE — 85004 AUTOMATED DIFF WBC COUNT: CPT | Performed by: EMERGENCY MEDICINE

## 2025-01-16 PROCEDURE — 99284 EMERGENCY DEPT VISIT MOD MDM: CPT | Mod: 25

## 2025-01-16 PROCEDURE — 36415 COLL VENOUS BLD VENIPUNCTURE: CPT | Performed by: EMERGENCY MEDICINE

## 2025-01-16 PROCEDURE — 250N000009 HC RX 250: Performed by: EMERGENCY MEDICINE

## 2025-01-16 PROCEDURE — 72125 CT NECK SPINE W/O DYE: CPT

## 2025-01-16 PROCEDURE — 70450 CT HEAD/BRAIN W/O DYE: CPT

## 2025-01-16 PROCEDURE — 12001 RPR S/N/AX/GEN/TRNK 2.5CM/<: CPT

## 2025-01-16 PROCEDURE — 84520 ASSAY OF UREA NITROGEN: CPT | Performed by: EMERGENCY MEDICINE

## 2025-01-16 PROCEDURE — 80048 BASIC METABOLIC PNL TOTAL CA: CPT | Performed by: EMERGENCY MEDICINE

## 2025-01-16 RX ADMIN — Medication: at 12:45

## 2025-01-16 ASSESSMENT — ACTIVITIES OF DAILY LIVING (ADL)
ADLS_ACUITY_SCORE: 60

## 2025-01-16 NOTE — DISCHARGE INSTRUCTIONS
Discharge Instructions  Laceration (Cut)    You were seen today for a laceration (cut).  Your provider examined your laceration for any problems such a buried foreign body (like glass, a splinter, or gravel), or injury to blood vessels, tendons, and nerves.  Your provider may have also rinsed and/or scrubbed your laceration to help prevent an infection. It may not be possible to find all problems with your laceration on the first visit; occasionally foreign bodies or a tendon injury can go undetected.    Your laceration may have been closed in one of several ways:  No closure: many wounds will heal just fine without closure.  Stitches: regular stitches that require removal.  Staples: skin staples are often used in the scalp/head.  Wound adhesive (glue): skin glue can be used for certain lacerations and doesn t require removal.  Wound strips (aka Butterfly bandages or steri-strips): these are bandages that help to close a wound.  Absorbable stitches:  dissolving  stitches that go away on their own and usually don t require removal.    A small percentage of wounds will develop an infection regardless of how well the wound is cared for. Antibiotics are generally not indicated to prevent an infection so are only given for a small number of high-risk wounds. Some lacerations are too high risk to close, and are left open to heal because closure can increase the likelihood that an infection will develop.    Remember that all lacerations, no matter how expertly repaired, will cause scarring. We consider many factors, techniques, and materials, in our efforts to provide the best possible cosmetic outcome.    Generally, every Emergency Department visit should have a follow-up clinic visit with either a primary or a specialty clinic/provider. Please follow-up as instructed by your emergency provider today.     Return to the Emergency Department right away if:  You have more redness, swelling, pain, drainage (pus), a bad smell,  or red streaking from your laceration as these symptoms could indicate an infection.  You have a fever of 100.4 F or more.  You have bleeding that you cannot stop at home. If your cut starts to bleed, hold pressure on the bleeding area with a clean cloth or put pressure over the bandage.  If the bleeding does not stop after using constant pressure for 30 minutes, you should return to the Emergency Department for further treatment.  An area past the laceration is cool, pale, or blue compared with the other side, or has a slower return of color when squeezed.  Your dressing seems too tight or starts to get uncomfortable or painful. For children, signs of a problem might be irritability or restlessness.  You have loss of normal function or use of an area, such as being unable to straighten or bend a finger normally.  You have a numb area past the laceration.    Return to the Emergency Department or see your regular provider if:  The laceration starts to come open.   You have something coming out of the cut or a feeling that there is something in the laceration.  Your wound will not heal, or keeps breaking open. There can always be glass, wood, dirt or other things in any wound.  They will not always show up, even on x-rays.  If a wound does not heal, this may be why, and it is important to follow-up with your regular provider.    Home Care:  Take your dressing off in 12-24 hours, or as instructed by your provider, to check your laceration. Remove the dressing sooner if it seems too tight or painful, or if it is getting numb, tingly, or pale past the dressing.  Gently wash your laceration 1-2 times daily with clean water and mild soap. It is okay to shower or run clean water over the laceration, but do not let the laceration soak in water (no swimming).  If your laceration was closed with wound adhesive or strips: pat it dry and leave it open to the air. For all other repairs: after you wash your laceration, or at least  2 times a day, apply antibiotic ointment (such as Neosporin  or Bacitracin ) to the laceration, then cover it with a Band-Aid  or gauze.  Keep the laceration clean. Wear gloves or other protective clothing if you are around dirt.    Follow-up for removal:  If your wound was closed with staples or regular stitches, they need to be removed according to the instructions and timeline specified by your provider today.  If your wound was closed with absorbable ( dissolving ) sutures, they should fall out, dissolve, or not be visible in about one week. If they are still visible, then they should be removed according to the instructions and timeline specified by your provider today.    Scars:  To help minimize scarring:  Wear sunscreen over the healed laceration when out in the sun.  Massage the area regularly once healed.  You may apply Vitamin E to the healed wound.  Wait. Scars improve in appearance over months and years.    If you were given a prescription for medicine here today, be sure to read all of the information (including the package insert) that comes with your prescription.  This will include important information about the medicine, its side effects, and any warnings that you need to know about.  The pharmacist who fills the prescription can provide more information and answer questions you may have about the medicine.  If you have questions or concerns that the pharmacist cannot address, please call or return to the Emergency Department.       Remember that you can always come back to the Emergency Department if you are not able to see your regular provider in the amount of time listed above, if you get any new symptoms, or if there is anything that worries you.    Discharge Instructions  Head Injury    You have been seen today for a head injury. Your evaluation included a history and physical examination. You may have had a CT (CAT) scan performed, though most head injuries do not require a scan. Based on  this evaluation, your provider today does not feel that your head injury is serious.    Generally, every Emergency Department visit should have a follow-up clinic visit with either a primary or a specialty clinic/provider. Please follow-up as instructed by your emergency provider today.  Return to the Emergency Department if:  You are confused or you are not acting right.  Your headache gets worse or you start to have a really bad headache even with your recommended treatment plan.  You vomit (throw up) more than once.  You have a seizure.  You have trouble walking.  You have weakness or paralysis (cannot move) in an arm or a leg.  You have blood or fluid coming from your ears or nose.  You have new symptoms or anything that worries you.    Sleeping:  It is okay for you to sleep, but someone should wake you up if instructed by your provider, and someone should check on you at your usual time to wake up.     Activity:  Do not drive for at least 24 hours.  Do not drive if you have dizzy spells or trouble concentrating, or remembering things.  Do not return to any contact sports until cleared by your regular provider.     MORE INFORMATION:    Concussion:  A concussion is a minor head injury that may cause temporary problems with the way the brain works. Although concussions are important, they are generally not an emergency or a reason that a person needs to be hospitalized. Some concussion symptoms include confusion, amnesia (forgetful), nausea (sick to your stomach) and vomiting (throwing up), dizziness, fatigue, memory or concentration problems, irritability and sleep problems. For most people, concussions are mild and temporary but some will have more severe and persistent symptoms that require on-going care and treatment.  CT Scans: Your evaluation today may have included a CT scan (CAT scan) to look for things like bleeding or a skull fracture (broken bone).  CT scans involve radiation and too many CT scans can  cause serious health problems like cancer, especially in children.  Because of this, your provider may not have ordered a CT scan today if they think you are at low risk for a serious or life threatening problem.    Blood Thinners. If you take blood thinners, there is a very small risk of delayed bleeding after your head injury. In the days/weeks to come, watch for the symptoms described above particularly headaches, confusion, problems walking, and weakness in an arm or leg.    If you were given a prescription for medicine here today, be sure to read all of the information (including the package insert) that comes with your prescription.  This will include important information about the medicine, its side effects, and any warnings that you need to know about.  The pharmacist who fills the prescription can provide more information and answer questions you may have about the medicine.  If you have questions or concerns that the pharmacist cannot address, please call or return to the Emergency Department.     Remember that you can always come back to the Emergency Department if you are not able to see your regular provider in the amount of time listed above, if you get any new symptoms, or if there is anything that worries you.

## 2025-01-16 NOTE — ED TRIAGE NOTES
Pt c/o head lac after a fall today at her memory care facility, staff noticed pt walking w/o her walker, when staff went to grab walker they found pt on the carpeted ground, unwitnessed fall, pt A/O to self at baseline, daughter POA, , VSS, ABC intact.       Triage Assessment (Adult)       Row Name 01/16/25 1210          Triage Assessment    Airway WDL WDL        Respiratory WDL    Respiratory WDL WDL        Skin Circulation/Temperature WDL    Skin Circulation/Temperature WDL WDL        Cardiac WDL    Cardiac WDL WDL        Peripheral/Neurovascular WDL    Peripheral Neurovascular WDL WDL        Cognitive/Neuro/Behavioral WDL    Cognitive/Neuro/Behavioral WDL X     Level of Consciousness confused     Orientation disoriented to;time;place;person        Pupils (CN II)    Pupil PERRLA yes     Pupil Size Left 3 mm     Pupil Size Right 3 mm        Norman Coma Scale    Best Eye Response 4-->(E4) spontaneous     Best Motor Response 5-->(M5) localizes pain     Best Verbal Response 4-->(V4) confused     Norman Coma Scale Score 13

## 2025-01-16 NOTE — ED NOTES
Bed: ED26  Expected date:   Expected time:   Means of arrival:   Comments:  432  89 F fall/head injury/h/o Alzheimers  1201

## 2025-01-16 NOTE — ED PROVIDER NOTES
Emergency Department Note      History of Present Illness     Chief Complaint   Fall      HPI   Danni Alvarez is a 89 year old female with history of type 2 diabetes, SDH, colon cancer, and hyperlipidemia who presents to the ED for evaluation of a scalp laceration from falling. Per EMS, patient was walking around today without her walker, so her facility staff went to grab her walker. They returned to her and found her lying on the carpeted ground with a laceration to the back of her head, but they are unsure how she fell.  It is reported that the patient must use a walker in order to walk and she falls without it.  She denies abdominal pain, headache, or pain anywhere else. Blood sugar was 132. She is alert and oriented to self at baseline.      Independent Historian   None    Review of External Notes   [facility] last tetanus was in 2021  Discharge summary from 8/7/2024 from Dr. Manzanares where the patient was seen after a unwitnessed fall and subdural hematoma    Past Medical History     Medical History and Problem List   Atherosclerosis of aorta  Type 2 diabetes  Depression  Dementia  Generalized muscle weakness  Repeated falls  Cognitive impairment  Hypothyroidism  Colon cancer  Rhabdomyolysis  Anxiety  Abnormal glucose  B12 deficiency  GERD  Hyperlipidemia  Urinary incontinence   Subdural hematoma    Medications   Seroquel  Aricept  Levothyroxine  Imodium  Zoloft    Surgical History   Cataract removal (B)  Colectomy  Hernia repair   Ptosis repair (B)  Tubal ligation    Physical Exam     Patient Vitals for the past 24 hrs:   BP Temp Temp src Pulse Resp SpO2   01/16/25 1218 (!) 144/74 (!) 96.7  F (35.9  C) Temporal 63 16 93 %     Physical Exam  General:  Laying on bed. Pt in mild distress. Pleasantly confused.  HENT:  No obvious trauma to head other than posterior scalp laceration measuring 1.2 cm. Negative raccoon eyes bilaterally.  Right Ear:  External ear normal.  Left Ear:  External ear normal.  Nose:  Nose  normal.  Eyes:  Conjunctivae and EOM are normal. Pupils are equal, round, and reactive.   Neck: Normal range of motion. Neck supple. No tracheal deviation present. No midline cervical neck tenderness, deformity, step off or pain in the midline with ROM.  CV:  Normal heart sounds. No murmur heard.  Pulm/Chest: Effort normal and breath sounds normal.   Abd: Soft. No distension. There is no tenderness. There is no rigidity, no rebound and no guarding.   M/S: Normal range of motion. No pain to palpation or deformity of all 4 extremities. No pain to palpation of step off to thoracic and lumbar spine.  Neuro: Alert. CN II-XII Grossly intact. GCS 15.  Skin: Skin is warm and dry. No rash noted. Not diaphoretic.   Psych: Normal mood and affect. Behavior is normal.     Diagnostics     Lab Results   Labs Ordered and Resulted from Time of ED Arrival to Time of ED Departure   BASIC METABOLIC PANEL - Abnormal       Result Value    Sodium 144      Potassium 4.6      Chloride 107      Carbon Dioxide (CO2) 26      Anion Gap 11      Urea Nitrogen 30.8 (*)     Creatinine 0.82      GFR Estimate 67      Calcium 9.3      Glucose 118 (*)    CBC WITH PLATELETS AND DIFFERENTIAL    WBC Count 7.0      RBC Count 4.31      Hemoglobin 13.7      Hematocrit 42.0      MCV 97      MCH 31.8      MCHC 32.6      RDW 14.3      Platelet Count 190      % Neutrophils 66      % Lymphocytes 24      % Monocytes 7      % Eosinophils 2      % Basophils 0      % Immature Granulocytes 1      NRBCs per 100 WBC 0      Absolute Neutrophils 4.7      Absolute Lymphocytes 1.7      Absolute Monocytes 0.5      Absolute Eosinophils 0.1      Absolute Basophils 0.0      Absolute Immature Granulocytes 0.0      Absolute NRBCs 0.0         Imaging   CT Cervical Spine w/o Contrast   Final Result   IMPRESSION:   1.  No fracture or posttraumatic subluxation.      Head CT w/o contrast   Final Result   IMPRESSION:   1.  No CT evidence for acute intracranial process.   2.  Brain  atrophy and presumed chronic microvascular ischemic changes as above.   3.  New layering fluid throughout the paranasal sinuses, which may reflect acute sinusitis in the appropriate clinical setting.   4.  Midline occipital swelling and laceration. No underlying calvarial fracture.      XR Pelvis 1/2 Views   Final Result   IMPRESSION: No acute displaced fracture or subluxation. Mild bilateral hip arthrosis. Increased density of the femoral heads could represent reactive subchondral sclerosis or osteonecrosis. Chronic fracture deformities of the bilateral superior and    inferior pubic rami.            Independent Interpretation   CT Head: No intracranial hemorrhage.  X-ray pelvis shows no hip fracture    ED Course      Medications Administered   Medications   lido-EPINEPHrine-tetracaine (LET) topical gel GEL ( Topical $Given 1/16/25 1245)       Procedures     Laceration Repair      Procedure: Laceration Repair    Indication: Laceration    Consent: Verbal    Tetanus status reviewed    Location: Posterior Scalp     Length: 1.2 cm    Preparation: Irrigation with Sterile Saline.    Anesthesia/Sedation: Topical -LET      Treatment/Exploration: Wound explored, no foreign bodies found     Closure: The wound was closed with  4 staples.    Patient Status: The patient tolerated the procedure well: Yes. There were no complications.      Discussion of Management   None    ED Course   ED Course as of 01/16/25 1408   Thu Jan 16, 2025   1223 I obtained history and examined the patient as noted above.     1407 Called patient's daughter, Arely.  Reviewed all results with her and incidental findings on the pelvic x-ray.  Discussed reasons to return including any delayed intracranial hemorrhage signs.  Discussed staples need to be removed in approximately 5 days.       Additional Documentation  None    Medical Decision Making / Diagnosis     CMS Diagnoses: None    MIPS       None    [facility]   Danni Alvarez is a very pleasant 89 year  old female celebrating her birthday today who presents for evaluation after head injury. Details of the patent's history can be noted in the HPI. Upon my exam, the patient appeared well and was neurologically intact at her baseline. There was a head laceration from the event as noted above. The wound was carefully explored and evaluated.  The laceration was closed as noted in the procedure note above.  There was no evidence of muscular, tendon, bone, or nerve damage with this laceration.  There is no evidence of foreign body. Possible complications such as infection and scarring were reviewed.  They will return to the ED for any signs of increased redness, streaking, drainage, fevers, new concerns.  They will apply bacitracin daily.  Additional staple care was discussed they will follow-up with her primary care provider or another medical facility and provided in the discharge paperwork.  For suture removal as noted in their discharge instructions.     Differential of head injury includes epidural hematoma, subdural hematoma, skull fracture, concussion intracerebral hemorrhage and traumatic subarachnoid hemorrhage. CT head unremarkable without evidence of hemorrhage. There was indication for advanced cervical spine imaging. Detailed exam, reveals simply the laceration.  Head to toe exam reveals no other traumatic injury.  This is a mechanical fall and I do not believe any additional workup is necessary.  She is supposed to use a walker at baseline and was found attempting to walk without her walker.  Basic labs were checked and found to be unremarkable.  No fever.  She is hemodynamically stable.    The patient will return to the ED for any red flag signs, including change in behavior, severe headache, drowsiness, seizures, loss of consciousness, confusion, vomiting, etc. RN contact the daughter to review this presentation and plan.  Home precautions and symptomatic care discussed.  We also discussed second impact  syndrome in the instance in which the patient would have a concussion.  The patient will be rechecked by their primary care provider in the next 2 to 3 days.  All questions were answered by the patient's discharge.  The patient was in agreement with the treatment plan as stated above.    The treatment plan was discussed with the patient and they expressed understanding of this plan and consented to the plan.  In addition, the patient will return to the emergency department if their symptoms persist, worsen, if new symptoms arise or if there is any concern as other pathology may be present that is not evident at this time. They also understand the importance of close follow up in the clinic and if unable to do so will return to the emergency department for a reevaluation. All questions were answered.    Disposition   The patient was discharged.     Diagnosis     ICD-10-CM    1. Scalp laceration, initial encounter  S01.01XA       2. Fall, initial encounter  W19.XXXA            Discharge Medications   New Prescriptions    No medications on file         Scribe Disclosure:  IWolf, am serving as a scribe at 12:57 PM on 1/16/2025 to document services personally performed by Niall Gibbons DO based on my observations and the provider's statements to me.   IAngela, am serving as a scribe at 12:57 PM on 1/16/2025 to document services personally performed by Niall Gibbons DO based on my observations and the provider's statements to me.            Niall Gibbons DO  01/16/25 1407